# Patient Record
Sex: FEMALE | Race: WHITE | Employment: FULL TIME | URBAN - METROPOLITAN AREA
[De-identification: names, ages, dates, MRNs, and addresses within clinical notes are randomized per-mention and may not be internally consistent; named-entity substitution may affect disease eponyms.]

---

## 2018-05-21 ENCOUNTER — HOSPITAL ENCOUNTER (EMERGENCY)
Age: 53
Discharge: HOME | End: 2018-05-22
Payer: COMMERCIAL

## 2018-05-21 VITALS
OXYGEN SATURATION: 99 % | HEART RATE: 85 BPM | DIASTOLIC BLOOD PRESSURE: 90 MMHG | WEIGHT: 250 LBS | SYSTOLIC BLOOD PRESSURE: 174 MMHG | HEIGHT: 64 IN | RESPIRATION RATE: 14 BRPM | TEMPERATURE: 97.5 F | BODY MASS INDEX: 42.68 KG/M2

## 2018-05-21 DIAGNOSIS — R00.2 PALPITATIONS: Primary | ICD-10-CM

## 2018-05-21 LAB
EKG ATRIAL RATE: 97 BPM
EKG P AXIS: 34 DEGREES
EKG P-R INTERVAL: 166 MS
EKG Q-T INTERVAL: 390 MS
EKG QRS DURATION: 128 MS
EKG QTC CALCULATION (BAZETT): 495 MS
EKG R AXIS: -78 DEGREES
EKG T AXIS: 49 DEGREES
EKG VENTRICULAR RATE: 97 BPM

## 2018-05-21 PROCEDURE — 99285 EMERGENCY DEPT VISIT HI MDM: CPT

## 2018-05-21 RX ORDER — 0.9 % SODIUM CHLORIDE 0.9 %
1000 INTRAVENOUS SOLUTION INTRAVENOUS ONCE
Status: COMPLETED | OUTPATIENT
Start: 2018-05-21 | End: 2018-05-22

## 2018-05-21 RX ORDER — DIAZEPAM 10 MG/1
10 TABLET ORAL PRN
COMMUNITY
End: 2019-11-25 | Stop reason: SDUPTHER

## 2018-05-22 ENCOUNTER — APPOINTMENT (OUTPATIENT)
Dept: GENERAL RADIOLOGY | Age: 53
End: 2018-05-22
Payer: COMMERCIAL

## 2018-05-22 LAB
ANION GAP SERPL CALCULATED.3IONS-SCNC: 14 MMOL/L (ref 7–16)
BASOPHILS ABSOLUTE: 0.04 E9/L (ref 0–0.2)
BASOPHILS RELATIVE PERCENT: 0.5 % (ref 0–2)
BUN BLDV-MCNC: 10 MG/DL (ref 6–20)
CALCIUM SERPL-MCNC: 9.9 MG/DL (ref 8.6–10.2)
CHLORIDE BLD-SCNC: 100 MMOL/L (ref 98–107)
CO2: 25 MMOL/L (ref 22–29)
CREAT SERPL-MCNC: 0.8 MG/DL (ref 0.5–1)
D DIMER: <200 NG/ML DDU
EOSINOPHILS ABSOLUTE: 0.03 E9/L (ref 0.05–0.5)
EOSINOPHILS RELATIVE PERCENT: 0.4 % (ref 0–6)
GFR AFRICAN AMERICAN: >60
GFR NON-AFRICAN AMERICAN: >60 ML/MIN/1.73
GLUCOSE BLD-MCNC: 97 MG/DL (ref 74–109)
HCT VFR BLD CALC: 43.9 % (ref 34–48)
HEMOGLOBIN: 14.1 G/DL (ref 11.5–15.5)
IMMATURE GRANULOCYTES #: 0.03 E9/L
IMMATURE GRANULOCYTES %: 0.4 % (ref 0–5)
LYMPHOCYTES ABSOLUTE: 1.82 E9/L (ref 1.5–4)
LYMPHOCYTES RELATIVE PERCENT: 23.6 % (ref 20–42)
MAGNESIUM: 1.9 MG/DL (ref 1.6–2.6)
MCH RBC QN AUTO: 25.7 PG (ref 26–35)
MCHC RBC AUTO-ENTMCNC: 32.1 % (ref 32–34.5)
MCV RBC AUTO: 80.1 FL (ref 80–99.9)
MONOCYTES ABSOLUTE: 0.46 E9/L (ref 0.1–0.95)
MONOCYTES RELATIVE PERCENT: 6 % (ref 2–12)
NEUTROPHILS ABSOLUTE: 5.34 E9/L (ref 1.8–7.3)
NEUTROPHILS RELATIVE PERCENT: 69.1 % (ref 43–80)
PDW BLD-RTO: 13.5 FL (ref 11.5–15)
PLATELET # BLD: 215 E9/L (ref 130–450)
PMV BLD AUTO: 11.1 FL (ref 7–12)
POTASSIUM SERPL-SCNC: 3.5 MMOL/L (ref 3.5–5)
POTASSIUM SERPL-SCNC: 5.4 MMOL/L (ref 3.5–5)
PRO-BNP: 49 PG/ML (ref 0–125)
RBC # BLD: 5.48 E12/L (ref 3.5–5.5)
SODIUM BLD-SCNC: 139 MMOL/L (ref 132–146)
TROPONIN: <0.01 NG/ML (ref 0–0.03)
TSH SERPL DL<=0.05 MIU/L-ACNC: 3.33 UIU/ML (ref 0.27–4.2)
WBC # BLD: 7.7 E9/L (ref 4.5–11.5)

## 2018-05-22 PROCEDURE — 85025 COMPLETE CBC W/AUTO DIFF WBC: CPT

## 2018-05-22 PROCEDURE — 84132 ASSAY OF SERUM POTASSIUM: CPT

## 2018-05-22 PROCEDURE — 84443 ASSAY THYROID STIM HORMONE: CPT

## 2018-05-22 PROCEDURE — 36415 COLL VENOUS BLD VENIPUNCTURE: CPT

## 2018-05-22 PROCEDURE — 85378 FIBRIN DEGRADE SEMIQUANT: CPT

## 2018-05-22 PROCEDURE — 80048 BASIC METABOLIC PNL TOTAL CA: CPT

## 2018-05-22 PROCEDURE — 71045 X-RAY EXAM CHEST 1 VIEW: CPT

## 2018-05-22 PROCEDURE — 84484 ASSAY OF TROPONIN QUANT: CPT

## 2018-05-22 PROCEDURE — 83735 ASSAY OF MAGNESIUM: CPT

## 2018-05-22 PROCEDURE — 83880 ASSAY OF NATRIURETIC PEPTIDE: CPT

## 2018-05-22 PROCEDURE — 2580000003 HC RX 258: Performed by: NURSE PRACTITIONER

## 2018-05-22 RX ADMIN — SODIUM CHLORIDE 1000 ML: 9 INJECTION, SOLUTION INTRAVENOUS at 00:47

## 2018-05-25 RX ORDER — ATENOLOL 25 MG/1
TABLET ORAL
Qty: 270 TABLET | Refills: 3 | Status: SHIPPED | OUTPATIENT
Start: 2018-05-25 | End: 2019-05-13 | Stop reason: SDUPTHER

## 2018-05-29 ENCOUNTER — OFFICE VISIT (OUTPATIENT)
Dept: CARDIOLOGY CLINIC | Age: 53
End: 2018-05-29
Payer: COMMERCIAL

## 2018-05-29 VITALS
BODY MASS INDEX: 49.25 KG/M2 | SYSTOLIC BLOOD PRESSURE: 132 MMHG | HEIGHT: 64 IN | RESPIRATION RATE: 18 BRPM | DIASTOLIC BLOOD PRESSURE: 84 MMHG | WEIGHT: 288.5 LBS | HEART RATE: 70 BPM

## 2018-05-29 DIAGNOSIS — G47.33 OBSTRUCTIVE SLEEP APNEA SYNDROME: ICD-10-CM

## 2018-05-29 DIAGNOSIS — E78.2 MIXED HYPERLIPIDEMIA: ICD-10-CM

## 2018-05-29 DIAGNOSIS — R00.2 PALPITATIONS: Primary | ICD-10-CM

## 2018-05-29 DIAGNOSIS — R60.0 LEG EDEMA: ICD-10-CM

## 2018-05-29 DIAGNOSIS — I45.10 RBBB: ICD-10-CM

## 2018-05-29 PROCEDURE — 1036F TOBACCO NON-USER: CPT | Performed by: PHYSICIAN ASSISTANT

## 2018-05-29 PROCEDURE — 93000 ELECTROCARDIOGRAM COMPLETE: CPT | Performed by: INTERNAL MEDICINE

## 2018-05-29 PROCEDURE — G8417 CALC BMI ABV UP PARAM F/U: HCPCS | Performed by: PHYSICIAN ASSISTANT

## 2018-05-29 PROCEDURE — 3017F COLORECTAL CA SCREEN DOC REV: CPT | Performed by: PHYSICIAN ASSISTANT

## 2018-05-29 PROCEDURE — 99213 OFFICE O/P EST LOW 20 MIN: CPT | Performed by: PHYSICIAN ASSISTANT

## 2018-05-29 PROCEDURE — G8427 DOCREV CUR MEDS BY ELIG CLIN: HCPCS | Performed by: PHYSICIAN ASSISTANT

## 2018-05-29 RX ORDER — PROMETHAZINE HYDROCHLORIDE AND CODEINE PHOSPHATE 6.25; 1 MG/5ML; MG/5ML
SYRUP ORAL
Refills: 0 | COMMUNITY
Start: 2018-05-22 | End: 2019-11-25

## 2018-05-29 RX ORDER — CEFDINIR 300 MG/1
CAPSULE ORAL
Refills: 0 | COMMUNITY
Start: 2018-05-22 | End: 2019-11-25

## 2018-05-29 RX ORDER — PREDNISONE 10 MG/1
TABLET ORAL
Refills: 0 | COMMUNITY
Start: 2018-05-22 | End: 2019-11-25 | Stop reason: SDUPTHER

## 2018-06-29 ENCOUNTER — OFFICE VISIT (OUTPATIENT)
Dept: CARDIOLOGY CLINIC | Age: 53
End: 2018-06-29
Payer: COMMERCIAL

## 2018-06-29 VITALS
DIASTOLIC BLOOD PRESSURE: 82 MMHG | WEIGHT: 278.6 LBS | HEIGHT: 64 IN | HEART RATE: 62 BPM | RESPIRATION RATE: 16 BRPM | SYSTOLIC BLOOD PRESSURE: 118 MMHG | BODY MASS INDEX: 47.56 KG/M2

## 2018-06-29 DIAGNOSIS — I25.2 MI, OLD: Primary | ICD-10-CM

## 2018-06-29 DIAGNOSIS — R00.2 PALPITATIONS: Primary | ICD-10-CM

## 2018-06-29 DIAGNOSIS — E78.5 HYPERLIPIDEMIA, UNSPECIFIED HYPERLIPIDEMIA TYPE: ICD-10-CM

## 2018-06-29 PROCEDURE — G8427 DOCREV CUR MEDS BY ELIG CLIN: HCPCS | Performed by: INTERNAL MEDICINE

## 2018-06-29 PROCEDURE — 1036F TOBACCO NON-USER: CPT | Performed by: INTERNAL MEDICINE

## 2018-06-29 PROCEDURE — 99213 OFFICE O/P EST LOW 20 MIN: CPT | Performed by: INTERNAL MEDICINE

## 2018-06-29 PROCEDURE — 3017F COLORECTAL CA SCREEN DOC REV: CPT | Performed by: INTERNAL MEDICINE

## 2018-06-29 PROCEDURE — 93000 ELECTROCARDIOGRAM COMPLETE: CPT | Performed by: INTERNAL MEDICINE

## 2018-06-29 PROCEDURE — G8417 CALC BMI ABV UP PARAM F/U: HCPCS | Performed by: INTERNAL MEDICINE

## 2018-09-13 ENCOUNTER — TELEPHONE (OUTPATIENT)
Dept: CARDIOLOGY CLINIC | Age: 53
End: 2018-09-13

## 2019-05-13 RX ORDER — ATENOLOL 25 MG/1
TABLET ORAL
Qty: 270 TABLET | Refills: 3 | Status: SHIPPED | OUTPATIENT
Start: 2019-05-13 | End: 2019-08-10 | Stop reason: SDUPTHER

## 2019-05-14 VITALS
HEIGHT: 65 IN | TEMPERATURE: 98.6 F | BODY MASS INDEX: 41.6 KG/M2 | SYSTOLIC BLOOD PRESSURE: 128 MMHG | DIASTOLIC BLOOD PRESSURE: 78 MMHG | HEART RATE: 104 BPM

## 2019-05-14 RX ORDER — ALPRAZOLAM 0.5 MG/1
TABLET ORAL EVERY 4 HOURS PRN
COMMUNITY
Start: 2016-01-20 | End: 2019-11-25

## 2019-05-14 RX ORDER — POTASSIUM CHLORIDE 1500 MG/1
TABLET, FILM COATED, EXTENDED RELEASE ORAL DAILY
COMMUNITY
Start: 2018-12-04 | End: 2019-11-25

## 2019-08-10 RX ORDER — ATENOLOL 25 MG/1
25 TABLET ORAL 3 TIMES DAILY
Qty: 270 TABLET | Refills: 5 | Status: SHIPPED | OUTPATIENT
Start: 2019-08-10 | End: 2019-11-25 | Stop reason: SDUPTHER

## 2019-08-28 ENCOUNTER — TELEPHONE (OUTPATIENT)
Dept: ADMINISTRATIVE | Age: 54
End: 2019-08-28

## 2019-08-28 DIAGNOSIS — E03.9 ACQUIRED HYPOTHYROIDISM: ICD-10-CM

## 2019-08-28 DIAGNOSIS — Z00.01 ENCOUNTER FOR ANNUAL GENERAL MEDICAL EXAMINATION WITH ABNORMAL FINDINGS IN ADULT: Primary | ICD-10-CM

## 2019-08-28 DIAGNOSIS — I25.10 CORONARY ARTERY DISEASE INVOLVING NATIVE CORONARY ARTERY OF NATIVE HEART WITHOUT ANGINA PECTORIS: ICD-10-CM

## 2019-08-28 DIAGNOSIS — E11.9 DIET-CONTROLLED DIABETES MELLITUS (HCC): ICD-10-CM

## 2019-08-28 NOTE — TELEPHONE ENCOUNTER
Pt scheduled yearly for November and wants to be sure that a lab order is added to get labs prior.     thanks

## 2019-11-22 ENCOUNTER — HOSPITAL ENCOUNTER (OUTPATIENT)
Age: 54
Discharge: HOME | End: 2019-11-24
Payer: COMMERCIAL

## 2019-11-22 DIAGNOSIS — E11.9 DIET-CONTROLLED DIABETES MELLITUS (HCC): ICD-10-CM

## 2019-11-22 DIAGNOSIS — I25.10 CORONARY ARTERY DISEASE INVOLVING NATIVE CORONARY ARTERY OF NATIVE HEART WITHOUT ANGINA PECTORIS: ICD-10-CM

## 2019-11-22 DIAGNOSIS — Z00.01 ENCOUNTER FOR ANNUAL GENERAL MEDICAL EXAMINATION WITH ABNORMAL FINDINGS IN ADULT: ICD-10-CM

## 2019-11-22 DIAGNOSIS — E03.9 ACQUIRED HYPOTHYROIDISM: ICD-10-CM

## 2019-11-22 LAB
ALBUMIN SERPL-MCNC: 4.3 G/DL (ref 3.5–5.2)
ALP BLD-CCNC: 69 U/L (ref 35–104)
ALT SERPL-CCNC: 15 U/L (ref 0–32)
ANION GAP SERPL CALCULATED.3IONS-SCNC: 19 MMOL/L (ref 7–16)
AST SERPL-CCNC: 15 U/L (ref 0–31)
BASOPHILS ABSOLUTE: 0.04 E9/L (ref 0–0.2)
BASOPHILS RELATIVE PERCENT: 0.6 % (ref 0–2)
BILIRUB SERPL-MCNC: 0.6 MG/DL (ref 0–1.2)
BUN BLDV-MCNC: 9 MG/DL (ref 6–20)
CALCIUM SERPL-MCNC: 9.8 MG/DL (ref 8.6–10.2)
CHLORIDE BLD-SCNC: 100 MMOL/L (ref 98–107)
CHOLESTEROL, TOTAL: 166 MG/DL (ref 0–199)
CO2: 23 MMOL/L (ref 22–29)
CREAT SERPL-MCNC: 0.8 MG/DL (ref 0.5–1)
EOSINOPHILS ABSOLUTE: 0.04 E9/L (ref 0.05–0.5)
EOSINOPHILS RELATIVE PERCENT: 0.6 % (ref 0–6)
GFR AFRICAN AMERICAN: >60
GFR NON-AFRICAN AMERICAN: >60 ML/MIN/1.73
GLUCOSE BLD-MCNC: 84 MG/DL (ref 74–99)
HBA1C MFR BLD: 5.7 % (ref 4–5.6)
HCT VFR BLD CALC: 45.4 % (ref 34–48)
HDLC SERPL-MCNC: 49 MG/DL
HEMOGLOBIN: 13.4 G/DL (ref 11.5–15.5)
IMMATURE GRANULOCYTES #: 0.02 E9/L
IMMATURE GRANULOCYTES %: 0.3 % (ref 0–5)
LDL CHOLESTEROL CALCULATED: 60 MG/DL (ref 0–99)
LYMPHOCYTES ABSOLUTE: 1.34 E9/L (ref 1.5–4)
LYMPHOCYTES RELATIVE PERCENT: 19.2 % (ref 20–42)
MCH RBC QN AUTO: 24 PG (ref 26–35)
MCHC RBC AUTO-ENTMCNC: 29.5 % (ref 32–34.5)
MCV RBC AUTO: 81.4 FL (ref 80–99.9)
MONOCYTES ABSOLUTE: 0.43 E9/L (ref 0.1–0.95)
MONOCYTES RELATIVE PERCENT: 6.2 % (ref 2–12)
NEUTROPHILS ABSOLUTE: 5.11 E9/L (ref 1.8–7.3)
NEUTROPHILS RELATIVE PERCENT: 73.1 % (ref 43–80)
PDW BLD-RTO: 15 FL (ref 11.5–15)
PLATELET # BLD: 226 E9/L (ref 130–450)
PMV BLD AUTO: 10.4 FL (ref 7–12)
POTASSIUM SERPL-SCNC: 4.3 MMOL/L (ref 3.5–5)
RBC # BLD: 5.58 E12/L (ref 3.5–5.5)
SODIUM BLD-SCNC: 142 MMOL/L (ref 132–146)
T4 TOTAL: 8.4 MCG/DL (ref 4.5–11.7)
TOTAL PROTEIN: 7.2 G/DL (ref 6.4–8.3)
TRIGL SERPL-MCNC: 286 MG/DL (ref 0–149)
TSH SERPL DL<=0.05 MIU/L-ACNC: 4.77 UIU/ML (ref 0.27–4.2)
VLDLC SERPL CALC-MCNC: 57 MG/DL
WBC # BLD: 7 E9/L (ref 4.5–11.5)

## 2019-11-22 PROCEDURE — 85025 COMPLETE CBC W/AUTO DIFF WBC: CPT

## 2019-11-22 PROCEDURE — 36415 COLL VENOUS BLD VENIPUNCTURE: CPT

## 2019-11-22 PROCEDURE — 84443 ASSAY THYROID STIM HORMONE: CPT

## 2019-11-22 PROCEDURE — 84436 ASSAY OF TOTAL THYROXINE: CPT

## 2019-11-22 PROCEDURE — 80061 LIPID PANEL: CPT

## 2019-11-22 PROCEDURE — 83036 HEMOGLOBIN GLYCOSYLATED A1C: CPT

## 2019-11-22 PROCEDURE — 80053 COMPREHEN METABOLIC PANEL: CPT

## 2019-11-25 ENCOUNTER — OFFICE VISIT (OUTPATIENT)
Dept: PRIMARY CARE CLINIC | Age: 54
End: 2019-11-25
Payer: COMMERCIAL

## 2019-11-25 VITALS
BODY MASS INDEX: 50.02 KG/M2 | WEIGHT: 293 LBS | HEIGHT: 64 IN | SYSTOLIC BLOOD PRESSURE: 134 MMHG | DIASTOLIC BLOOD PRESSURE: 82 MMHG | TEMPERATURE: 98.7 F

## 2019-11-25 DIAGNOSIS — Z00.01 ENCOUNTER FOR ANNUAL GENERAL MEDICAL EXAMINATION WITH ABNORMAL FINDINGS IN ADULT: Primary | ICD-10-CM

## 2019-11-25 DIAGNOSIS — E03.9 ACQUIRED HYPOTHYROIDISM: ICD-10-CM

## 2019-11-25 DIAGNOSIS — I25.10 CORONARY ARTERY DISEASE INVOLVING NATIVE CORONARY ARTERY OF NATIVE HEART WITHOUT ANGINA PECTORIS: ICD-10-CM

## 2019-11-25 DIAGNOSIS — F41.9 ANXIETY: ICD-10-CM

## 2019-11-25 DIAGNOSIS — E78.2 MIXED HYPERLIPIDEMIA: ICD-10-CM

## 2019-11-25 DIAGNOSIS — Z12.39 SCREENING FOR MALIGNANT NEOPLASM OF BREAST: ICD-10-CM

## 2019-11-25 DIAGNOSIS — I10 ESSENTIAL HYPERTENSION: ICD-10-CM

## 2019-11-25 DIAGNOSIS — G47.33 SLEEP APNEA, OBSTRUCTIVE: ICD-10-CM

## 2019-11-25 DIAGNOSIS — Z12.11 SCREENING FOR MALIGNANT NEOPLASM OF COLON: ICD-10-CM

## 2019-11-25 DIAGNOSIS — K43.9 VENTRAL HERNIA WITHOUT OBSTRUCTION OR GANGRENE: ICD-10-CM

## 2019-11-25 DIAGNOSIS — Z23 NEED FOR INFLUENZA VACCINATION: ICD-10-CM

## 2019-11-25 DIAGNOSIS — M17.0 PRIMARY OSTEOARTHRITIS OF BOTH KNEES: ICD-10-CM

## 2019-11-25 PROCEDURE — G8484 FLU IMMUNIZE NO ADMIN: HCPCS | Performed by: FAMILY MEDICINE

## 2019-11-25 PROCEDURE — 99396 PREV VISIT EST AGE 40-64: CPT | Performed by: FAMILY MEDICINE

## 2019-11-25 RX ORDER — SIMVASTATIN 40 MG
40 TABLET ORAL NIGHTLY
Qty: 90 TABLET | Refills: 12 | Status: SHIPPED
Start: 2019-11-25 | End: 2020-12-23 | Stop reason: SDUPTHER

## 2019-11-25 RX ORDER — DIAZEPAM 10 MG/1
10 TABLET ORAL EVERY 12 HOURS PRN
Qty: 14 TABLET | Refills: 0 | Status: SHIPPED | OUTPATIENT
Start: 2019-11-25 | End: 2019-12-02

## 2019-11-25 RX ORDER — PREDNISONE 10 MG/1
10 TABLET ORAL 3 TIMES DAILY
Qty: 15 TABLET | Refills: 2 | Status: SHIPPED | OUTPATIENT
Start: 2019-11-25 | End: 2020-01-02 | Stop reason: CLARIF

## 2019-11-25 RX ORDER — TORSEMIDE 5 MG/1
5 TABLET ORAL DAILY
Qty: 90 TABLET | Refills: 5 | Status: SHIPPED
Start: 2019-11-25 | End: 2021-02-03 | Stop reason: SDUPTHER

## 2019-11-25 RX ORDER — POTASSIUM CHLORIDE 20 MEQ/1
20 TABLET, EXTENDED RELEASE ORAL DAILY
Qty: 90 TABLET | Refills: 12 | Status: SHIPPED
Start: 2019-11-25 | End: 2021-02-03 | Stop reason: SDUPTHER

## 2019-11-25 RX ORDER — LEVOTHYROXINE SODIUM 0.15 MG/1
150 TABLET ORAL DAILY
Qty: 90 TABLET | Refills: 5 | Status: SHIPPED
Start: 2019-11-25 | End: 2020-12-23 | Stop reason: SDUPTHER

## 2019-11-25 RX ORDER — ATENOLOL 25 MG/1
25 TABLET ORAL 3 TIMES DAILY
Qty: 270 TABLET | Refills: 5 | Status: SHIPPED
Start: 2019-11-25 | End: 2021-02-03 | Stop reason: SDUPTHER

## 2019-11-25 ASSESSMENT — PATIENT HEALTH QUESTIONNAIRE - PHQ9
1. LITTLE INTEREST OR PLEASURE IN DOING THINGS: 0
2. FEELING DOWN, DEPRESSED OR HOPELESS: 0
SUM OF ALL RESPONSES TO PHQ QUESTIONS 1-9: 0
SUM OF ALL RESPONSES TO PHQ QUESTIONS 1-9: 0
SUM OF ALL RESPONSES TO PHQ9 QUESTIONS 1 & 2: 0

## 2019-11-25 ASSESSMENT — ENCOUNTER SYMPTOMS
ALLERGIC/IMMUNOLOGIC NEGATIVE: 1
RESPIRATORY NEGATIVE: 1
GASTROINTESTINAL NEGATIVE: 1
EYES NEGATIVE: 1

## 2019-12-02 ENCOUNTER — APPOINTMENT (OUTPATIENT)
Dept: CT IMAGING | Age: 54
End: 2019-12-02
Payer: COMMERCIAL

## 2019-12-02 ENCOUNTER — HOSPITAL ENCOUNTER (EMERGENCY)
Age: 54
Discharge: HOME | End: 2019-12-02
Attending: EMERGENCY MEDICINE
Payer: COMMERCIAL

## 2019-12-02 ENCOUNTER — APPOINTMENT (OUTPATIENT)
Dept: GENERAL RADIOLOGY | Age: 54
End: 2019-12-02
Payer: COMMERCIAL

## 2019-12-02 ENCOUNTER — OFFICE VISIT (OUTPATIENT)
Dept: PRIMARY CARE CLINIC | Age: 54
End: 2019-12-02
Payer: COMMERCIAL

## 2019-12-02 VITALS
HEIGHT: 64 IN | BODY MASS INDEX: 50.02 KG/M2 | HEART RATE: 78 BPM | OXYGEN SATURATION: 96 % | TEMPERATURE: 98.4 F | DIASTOLIC BLOOD PRESSURE: 72 MMHG | RESPIRATION RATE: 17 BRPM | SYSTOLIC BLOOD PRESSURE: 128 MMHG | WEIGHT: 293 LBS

## 2019-12-02 VITALS
WEIGHT: 293 LBS | OXYGEN SATURATION: 98 % | BODY MASS INDEX: 55.61 KG/M2 | HEART RATE: 100 BPM | SYSTOLIC BLOOD PRESSURE: 134 MMHG | TEMPERATURE: 98 F | DIASTOLIC BLOOD PRESSURE: 82 MMHG

## 2019-12-02 DIAGNOSIS — K43.9 VENTRAL HERNIA WITHOUT OBSTRUCTION OR GANGRENE: ICD-10-CM

## 2019-12-02 DIAGNOSIS — R10.32 LEFT LOWER QUADRANT ABDOMINAL PAIN: Primary | ICD-10-CM

## 2019-12-02 DIAGNOSIS — R10.84 GENERALIZED ABDOMINAL PAIN: ICD-10-CM

## 2019-12-02 DIAGNOSIS — K65.4 MESENTERIC PANNICULITIS (HCC): Primary | ICD-10-CM

## 2019-12-02 LAB
ALBUMIN SERPL-MCNC: 4 G/DL (ref 3.5–5.2)
ALP BLD-CCNC: 67 U/L (ref 35–104)
ALT SERPL-CCNC: 15 U/L (ref 0–32)
ANION GAP SERPL CALCULATED.3IONS-SCNC: 12 MMOL/L (ref 7–16)
AST SERPL-CCNC: 16 U/L (ref 0–31)
BACTERIA: NORMAL /HPF
BASOPHILS ABSOLUTE: 0.02 E9/L (ref 0–0.2)
BASOPHILS RELATIVE PERCENT: 0.4 % (ref 0–2)
BILIRUB SERPL-MCNC: 0.5 MG/DL (ref 0–1.2)
BILIRUBIN URINE: NEGATIVE
BLOOD, URINE: NEGATIVE
BUN BLDV-MCNC: 11 MG/DL (ref 6–20)
CALCIUM SERPL-MCNC: 9.4 MG/DL (ref 8.6–10.2)
CHLORIDE BLD-SCNC: 98 MMOL/L (ref 98–107)
CLARITY: CLEAR
CO2: 29 MMOL/L (ref 22–29)
COLOR: YELLOW
CREAT SERPL-MCNC: 0.8 MG/DL (ref 0.5–1)
EKG ATRIAL RATE: 67 BPM
EKG P AXIS: 29 DEGREES
EKG P-R INTERVAL: 180 MS
EKG Q-T INTERVAL: 432 MS
EKG QRS DURATION: 126 MS
EKG QTC CALCULATION (BAZETT): 456 MS
EKG R AXIS: -81 DEGREES
EKG T AXIS: 3 DEGREES
EKG VENTRICULAR RATE: 67 BPM
EOSINOPHILS ABSOLUTE: 0.07 E9/L (ref 0.05–0.5)
EOSINOPHILS RELATIVE PERCENT: 1.4 % (ref 0–6)
GFR AFRICAN AMERICAN: >60
GFR NON-AFRICAN AMERICAN: >60 ML/MIN/1.73
GLUCOSE BLD-MCNC: 102 MG/DL (ref 74–99)
GLUCOSE URINE: NEGATIVE MG/DL
HCT VFR BLD CALC: 44.4 % (ref 34–48)
HEMOGLOBIN: 13.5 G/DL (ref 11.5–15.5)
IMMATURE GRANULOCYTES #: 0.03 E9/L
IMMATURE GRANULOCYTES %: 0.6 % (ref 0–5)
KETONES, URINE: NEGATIVE MG/DL
LACTIC ACID: 1.5 MMOL/L (ref 0.5–2.2)
LEUKOCYTE ESTERASE, URINE: NEGATIVE
LIPASE: 18 U/L (ref 13–60)
LYMPHOCYTES ABSOLUTE: 1.28 E9/L (ref 1.5–4)
LYMPHOCYTES RELATIVE PERCENT: 24.8 % (ref 20–42)
MCH RBC QN AUTO: 24 PG (ref 26–35)
MCHC RBC AUTO-ENTMCNC: 30.4 % (ref 32–34.5)
MCV RBC AUTO: 79 FL (ref 80–99.9)
MONOCYTES ABSOLUTE: 0.43 E9/L (ref 0.1–0.95)
MONOCYTES RELATIVE PERCENT: 8.3 % (ref 2–12)
NEUTROPHILS ABSOLUTE: 3.33 E9/L (ref 1.8–7.3)
NEUTROPHILS RELATIVE PERCENT: 64.5 % (ref 43–80)
NITRITE, URINE: NEGATIVE
PDW BLD-RTO: 15.1 FL (ref 11.5–15)
PH UA: 6 (ref 5–9)
PLATELET # BLD: 206 E9/L (ref 130–450)
PMV BLD AUTO: 9.5 FL (ref 7–12)
POTASSIUM REFLEX MAGNESIUM: 3.8 MMOL/L (ref 3.5–5)
PROTEIN UA: NEGATIVE MG/DL
RBC # BLD: 5.62 E12/L (ref 3.5–5.5)
RBC UA: NORMAL /HPF (ref 0–2)
SODIUM BLD-SCNC: 139 MMOL/L (ref 132–146)
SPECIFIC GRAVITY UA: <=1.005 (ref 1–1.03)
TOTAL PROTEIN: 7.2 G/DL (ref 6.4–8.3)
TROPONIN: <0.01 NG/ML (ref 0–0.03)
UROBILINOGEN, URINE: 0.2 E.U./DL
WBC # BLD: 5.2 E9/L (ref 4.5–11.5)
WBC UA: NORMAL /HPF (ref 0–5)

## 2019-12-02 PROCEDURE — 71046 X-RAY EXAM CHEST 2 VIEWS: CPT

## 2019-12-02 PROCEDURE — 80053 COMPREHEN METABOLIC PANEL: CPT

## 2019-12-02 PROCEDURE — 99213 OFFICE O/P EST LOW 20 MIN: CPT | Performed by: FAMILY MEDICINE

## 2019-12-02 PROCEDURE — 6360000002 HC RX W HCPCS: Performed by: STUDENT IN AN ORGANIZED HEALTH CARE EDUCATION/TRAINING PROGRAM

## 2019-12-02 PROCEDURE — 84484 ASSAY OF TROPONIN QUANT: CPT

## 2019-12-02 PROCEDURE — 1036F TOBACCO NON-USER: CPT | Performed by: FAMILY MEDICINE

## 2019-12-02 PROCEDURE — 96375 TX/PRO/DX INJ NEW DRUG ADDON: CPT

## 2019-12-02 PROCEDURE — 99284 EMERGENCY DEPT VISIT MOD MDM: CPT

## 2019-12-02 PROCEDURE — G8417 CALC BMI ABV UP PARAM F/U: HCPCS | Performed by: FAMILY MEDICINE

## 2019-12-02 PROCEDURE — 2580000003 HC RX 258: Performed by: STUDENT IN AN ORGANIZED HEALTH CARE EDUCATION/TRAINING PROGRAM

## 2019-12-02 PROCEDURE — 96374 THER/PROPH/DIAG INJ IV PUSH: CPT

## 2019-12-02 PROCEDURE — G8598 ASA/ANTIPLAT THER USED: HCPCS | Performed by: FAMILY MEDICINE

## 2019-12-02 PROCEDURE — 83690 ASSAY OF LIPASE: CPT

## 2019-12-02 PROCEDURE — 3017F COLORECTAL CA SCREEN DOC REV: CPT | Performed by: FAMILY MEDICINE

## 2019-12-02 PROCEDURE — 83605 ASSAY OF LACTIC ACID: CPT

## 2019-12-02 PROCEDURE — 2580000003 HC RX 258: Performed by: RADIOLOGY

## 2019-12-02 PROCEDURE — 6360000004 HC RX CONTRAST MEDICATION: Performed by: RADIOLOGY

## 2019-12-02 PROCEDURE — 81001 URINALYSIS AUTO W/SCOPE: CPT

## 2019-12-02 PROCEDURE — 93005 ELECTROCARDIOGRAM TRACING: CPT | Performed by: STUDENT IN AN ORGANIZED HEALTH CARE EDUCATION/TRAINING PROGRAM

## 2019-12-02 PROCEDURE — 85025 COMPLETE CBC W/AUTO DIFF WBC: CPT

## 2019-12-02 PROCEDURE — 93010 ELECTROCARDIOGRAM REPORT: CPT | Performed by: INTERNAL MEDICINE

## 2019-12-02 PROCEDURE — G8484 FLU IMMUNIZE NO ADMIN: HCPCS | Performed by: FAMILY MEDICINE

## 2019-12-02 PROCEDURE — G8428 CUR MEDS NOT DOCUMENT: HCPCS | Performed by: FAMILY MEDICINE

## 2019-12-02 PROCEDURE — 74177 CT ABD & PELVIS W/CONTRAST: CPT

## 2019-12-02 RX ORDER — DIPHENHYDRAMINE HYDROCHLORIDE 50 MG/ML
50 INJECTION INTRAMUSCULAR; INTRAVENOUS ONCE
Status: COMPLETED | OUTPATIENT
Start: 2019-12-02 | End: 2019-12-02

## 2019-12-02 RX ORDER — METRONIDAZOLE 500 MG/1
500 TABLET ORAL 2 TIMES DAILY
Qty: 14 TABLET | Refills: 0 | Status: SHIPPED | OUTPATIENT
Start: 2019-12-02 | End: 2019-12-09

## 2019-12-02 RX ORDER — 0.9 % SODIUM CHLORIDE 0.9 %
1000 INTRAVENOUS SOLUTION INTRAVENOUS ONCE
Status: COMPLETED | OUTPATIENT
Start: 2019-12-02 | End: 2019-12-02

## 2019-12-02 RX ORDER — ONDANSETRON 2 MG/ML
4 INJECTION INTRAMUSCULAR; INTRAVENOUS ONCE
Status: COMPLETED | OUTPATIENT
Start: 2019-12-02 | End: 2019-12-02

## 2019-12-02 RX ORDER — SODIUM CHLORIDE 0.9 % (FLUSH) 0.9 %
10 SYRINGE (ML) INJECTION PRN
Status: COMPLETED | OUTPATIENT
Start: 2019-12-02 | End: 2019-12-02

## 2019-12-02 RX ORDER — MORPHINE SULFATE 2 MG/ML
2 INJECTION, SOLUTION INTRAMUSCULAR; INTRAVENOUS ONCE
Status: COMPLETED | OUTPATIENT
Start: 2019-12-02 | End: 2019-12-02

## 2019-12-02 RX ORDER — CEFDINIR 300 MG/1
300 CAPSULE ORAL 2 TIMES DAILY
Qty: 14 CAPSULE | Refills: 0 | Status: SHIPPED | OUTPATIENT
Start: 2019-12-02 | End: 2019-12-09

## 2019-12-02 RX ORDER — ONDANSETRON 4 MG/1
4 TABLET, ORALLY DISINTEGRATING ORAL EVERY 8 HOURS PRN
Qty: 10 TABLET | Refills: 0 | Status: SHIPPED | OUTPATIENT
Start: 2019-12-02 | End: 2020-12-23

## 2019-12-02 RX ORDER — METHYLPREDNISOLONE SODIUM SUCCINATE 125 MG/2ML
125 INJECTION, POWDER, LYOPHILIZED, FOR SOLUTION INTRAMUSCULAR; INTRAVENOUS ONCE
Status: COMPLETED | OUTPATIENT
Start: 2019-12-02 | End: 2019-12-02

## 2019-12-02 RX ORDER — NAPROXEN 250 MG/1
250 TABLET ORAL 2 TIMES DAILY WITH MEALS
Qty: 60 TABLET | Refills: 3 | Status: SHIPPED | OUTPATIENT
Start: 2019-12-02 | End: 2020-10-26

## 2019-12-02 RX ADMIN — SODIUM CHLORIDE 1000 ML: 9 INJECTION, SOLUTION INTRAVENOUS at 10:34

## 2019-12-02 RX ADMIN — MORPHINE SULFATE 2 MG: 2 INJECTION, SOLUTION INTRAMUSCULAR; INTRAVENOUS at 10:35

## 2019-12-02 RX ADMIN — DIPHENHYDRAMINE HYDROCHLORIDE 50 MG: 50 INJECTION, SOLUTION INTRAMUSCULAR; INTRAVENOUS at 10:34

## 2019-12-02 RX ADMIN — ONDANSETRON 4 MG: 2 INJECTION INTRAMUSCULAR; INTRAVENOUS at 10:35

## 2019-12-02 RX ADMIN — IOPAMIDOL 110 ML: 755 INJECTION, SOLUTION INTRAVENOUS at 12:19

## 2019-12-02 RX ADMIN — Medication 10 ML: at 12:17

## 2019-12-02 RX ADMIN — METHYLPREDNISOLONE SODIUM SUCCINATE 125 MG: 125 INJECTION, POWDER, FOR SOLUTION INTRAMUSCULAR; INTRAVENOUS at 10:34

## 2019-12-02 ASSESSMENT — ENCOUNTER SYMPTOMS
TROUBLE SWALLOWING: 0
SHORTNESS OF BREATH: 0
ABDOMINAL PAIN: 1
PHOTOPHOBIA: 0
VOMITING: 0
DIARRHEA: 1
HEMATEMESIS: 0
CONSTIPATION: 0
COUGH: 0
RESPIRATORY NEGATIVE: 1
ALLERGIC/IMMUNOLOGIC NEGATIVE: 1
BACK PAIN: 1
DIARRHEA: 1
EYES NEGATIVE: 1
NAUSEA: 1
HEMATOCHEZIA: 0

## 2019-12-02 ASSESSMENT — PAIN SCALES - GENERAL
PAINLEVEL_OUTOF10: 7
PAINLEVEL_OUTOF10: 2
PAINLEVEL_OUTOF10: 7

## 2019-12-10 ENCOUNTER — OFFICE VISIT (OUTPATIENT)
Dept: PRIMARY CARE CLINIC | Age: 54
End: 2019-12-10
Payer: COMMERCIAL

## 2019-12-10 VITALS — SYSTOLIC BLOOD PRESSURE: 134 MMHG | DIASTOLIC BLOOD PRESSURE: 82 MMHG | TEMPERATURE: 98.5 F

## 2019-12-10 DIAGNOSIS — K29.50 OTHER CHRONIC GASTRITIS WITHOUT HEMORRHAGE: ICD-10-CM

## 2019-12-10 DIAGNOSIS — E03.9 ACQUIRED HYPOTHYROIDISM: ICD-10-CM

## 2019-12-10 DIAGNOSIS — I10 ESSENTIAL HYPERTENSION: Primary | Chronic | ICD-10-CM

## 2019-12-10 DIAGNOSIS — E78.2 MIXED HYPERLIPIDEMIA: ICD-10-CM

## 2019-12-10 DIAGNOSIS — Z12.11 SCREEN FOR COLON CANCER: ICD-10-CM

## 2019-12-10 DIAGNOSIS — K43.9 VENTRAL HERNIA WITHOUT OBSTRUCTION OR GANGRENE: ICD-10-CM

## 2019-12-10 DIAGNOSIS — R73.03 PRE-DIABETES: ICD-10-CM

## 2019-12-10 PROCEDURE — 99213 OFFICE O/P EST LOW 20 MIN: CPT | Performed by: FAMILY MEDICINE

## 2019-12-10 PROCEDURE — G8598 ASA/ANTIPLAT THER USED: HCPCS | Performed by: FAMILY MEDICINE

## 2019-12-10 PROCEDURE — 3017F COLORECTAL CA SCREEN DOC REV: CPT | Performed by: FAMILY MEDICINE

## 2019-12-10 PROCEDURE — G8484 FLU IMMUNIZE NO ADMIN: HCPCS | Performed by: FAMILY MEDICINE

## 2019-12-10 PROCEDURE — G8428 CUR MEDS NOT DOCUMENT: HCPCS | Performed by: FAMILY MEDICINE

## 2019-12-10 PROCEDURE — 1036F TOBACCO NON-USER: CPT | Performed by: FAMILY MEDICINE

## 2019-12-10 PROCEDURE — G8417 CALC BMI ABV UP PARAM F/U: HCPCS | Performed by: FAMILY MEDICINE

## 2019-12-10 RX ORDER — PANTOPRAZOLE SODIUM 40 MG/1
40 TABLET, DELAYED RELEASE ORAL DAILY
Qty: 90 TABLET | Refills: 3 | Status: SHIPPED
Start: 2019-12-10 | End: 2020-12-23 | Stop reason: SDUPTHER

## 2019-12-10 RX ORDER — ONDANSETRON 4 MG/1
4 TABLET, FILM COATED ORAL 3 TIMES DAILY PRN
Qty: 100 TABLET | Refills: 5 | Status: SHIPPED | OUTPATIENT
Start: 2019-12-10 | End: 2020-12-23

## 2019-12-10 RX ORDER — PANTOPRAZOLE SODIUM 40 MG/1
40 TABLET, DELAYED RELEASE ORAL DAILY
COMMUNITY
Start: 2019-12-10 | End: 2019-12-10 | Stop reason: SDUPTHER

## 2019-12-10 ASSESSMENT — ENCOUNTER SYMPTOMS
GASTROINTESTINAL NEGATIVE: 1
RESPIRATORY NEGATIVE: 1
ALLERGIC/IMMUNOLOGIC NEGATIVE: 1
EYES NEGATIVE: 1

## 2019-12-30 ENCOUNTER — OFFICE VISIT (OUTPATIENT)
Dept: PRIMARY CARE CLINIC | Age: 54
End: 2019-12-30
Payer: COMMERCIAL

## 2019-12-30 ENCOUNTER — TELEPHONE (OUTPATIENT)
Dept: SURGERY | Age: 54
End: 2019-12-30

## 2019-12-30 VITALS
HEIGHT: 64 IN | RESPIRATION RATE: 16 BRPM | SYSTOLIC BLOOD PRESSURE: 134 MMHG | HEART RATE: 75 BPM | OXYGEN SATURATION: 96 % | BODY MASS INDEX: 51.84 KG/M2 | DIASTOLIC BLOOD PRESSURE: 82 MMHG

## 2019-12-30 DIAGNOSIS — R10.11 RIGHT UPPER QUADRANT ABDOMINAL PAIN: Primary | ICD-10-CM

## 2019-12-30 DIAGNOSIS — E66.01 MORBID (SEVERE) OBESITY DUE TO EXCESS CALORIES (HCC): ICD-10-CM

## 2019-12-30 DIAGNOSIS — K43.9 VENTRAL HERNIA WITHOUT OBSTRUCTION OR GANGRENE: Chronic | ICD-10-CM

## 2019-12-30 DIAGNOSIS — R10.11 RUQ PAIN: Primary | ICD-10-CM

## 2019-12-30 PROBLEM — F41.9 ANXIETY: Chronic | Status: ACTIVE | Noted: 2019-11-25

## 2019-12-30 PROBLEM — M17.0 PRIMARY OSTEOARTHRITIS OF BOTH KNEES: Chronic | Status: ACTIVE | Noted: 2019-11-25

## 2019-12-30 PROCEDURE — G8484 FLU IMMUNIZE NO ADMIN: HCPCS | Performed by: FAMILY MEDICINE

## 2019-12-30 PROCEDURE — 99213 OFFICE O/P EST LOW 20 MIN: CPT | Performed by: FAMILY MEDICINE

## 2019-12-30 PROCEDURE — G8428 CUR MEDS NOT DOCUMENT: HCPCS | Performed by: FAMILY MEDICINE

## 2019-12-30 PROCEDURE — 3017F COLORECTAL CA SCREEN DOC REV: CPT | Performed by: FAMILY MEDICINE

## 2019-12-30 PROCEDURE — G8598 ASA/ANTIPLAT THER USED: HCPCS | Performed by: FAMILY MEDICINE

## 2019-12-30 PROCEDURE — 1036F TOBACCO NON-USER: CPT | Performed by: FAMILY MEDICINE

## 2019-12-30 PROCEDURE — G8417 CALC BMI ABV UP PARAM F/U: HCPCS | Performed by: FAMILY MEDICINE

## 2019-12-30 ASSESSMENT — ENCOUNTER SYMPTOMS
ABDOMINAL PAIN: 1
EYES NEGATIVE: 1
ALLERGIC/IMMUNOLOGIC NEGATIVE: 1
RESPIRATORY NEGATIVE: 1

## 2019-12-30 ASSESSMENT — PATIENT HEALTH QUESTIONNAIRE - PHQ9
SUM OF ALL RESPONSES TO PHQ9 QUESTIONS 1 & 2: 0
2. FEELING DOWN, DEPRESSED OR HOPELESS: 0
SUM OF ALL RESPONSES TO PHQ QUESTIONS 1-9: 0
1. LITTLE INTEREST OR PLEASURE IN DOING THINGS: 0
SUM OF ALL RESPONSES TO PHQ QUESTIONS 1-9: 0

## 2019-12-31 ENCOUNTER — HOSPITAL ENCOUNTER (OUTPATIENT)
Dept: ULTRASOUND IMAGING | Age: 54
Discharge: HOME | End: 2020-01-02
Payer: COMMERCIAL

## 2019-12-31 PROCEDURE — 76705 ECHO EXAM OF ABDOMEN: CPT

## 2020-01-02 ENCOUNTER — OFFICE VISIT (OUTPATIENT)
Dept: SURGERY | Age: 55
End: 2020-01-02
Payer: COMMERCIAL

## 2020-01-02 VITALS
BODY MASS INDEX: 50.02 KG/M2 | DIASTOLIC BLOOD PRESSURE: 82 MMHG | HEIGHT: 64 IN | SYSTOLIC BLOOD PRESSURE: 120 MMHG | RESPIRATION RATE: 16 BRPM | OXYGEN SATURATION: 98 % | HEART RATE: 76 BPM | WEIGHT: 293 LBS

## 2020-01-02 PROCEDURE — 3017F COLORECTAL CA SCREEN DOC REV: CPT | Performed by: SURGERY

## 2020-01-02 PROCEDURE — 99203 OFFICE O/P NEW LOW 30 MIN: CPT | Performed by: SURGERY

## 2020-01-02 PROCEDURE — G8484 FLU IMMUNIZE NO ADMIN: HCPCS | Performed by: SURGERY

## 2020-01-02 PROCEDURE — G8417 CALC BMI ABV UP PARAM F/U: HCPCS | Performed by: SURGERY

## 2020-01-02 PROCEDURE — G8427 DOCREV CUR MEDS BY ELIG CLIN: HCPCS | Performed by: SURGERY

## 2020-01-02 PROCEDURE — 1036F TOBACCO NON-USER: CPT | Performed by: SURGERY

## 2020-01-02 RX ORDER — DIAZEPAM 10 MG/1
TABLET ORAL
Refills: 0 | COMMUNITY
Start: 2019-12-04 | End: 2020-05-13 | Stop reason: SDUPTHER
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
## 2020-01-14 ENCOUNTER — HOSPITAL ENCOUNTER (OUTPATIENT)
Dept: MAMMOGRAPHY | Age: 55
Discharge: HOME | End: 2020-01-16
Payer: COMMERCIAL

## 2020-01-14 PROCEDURE — 77063 BREAST TOMOSYNTHESIS BI: CPT

## 2020-01-17 ENCOUNTER — TELEPHONE (OUTPATIENT)
Dept: ONCOLOGY | Age: 55
End: 2020-01-17

## 2020-01-17 NOTE — TELEPHONE ENCOUNTER
Call to patient in reference to her mammogram performed on the [facility] on January 14, 2020. Instructed patient that the radiologist has recommended some additional breast imaging, in order to make a final determination/result. Informed her that a request for Rx has been faxed to the ordering physician. Once we receive the script a  from [facility] will contact her to schedule the additional imaging study/studies. Verbalizes understanding and is agreeable to proceed.        Ata Mak, RN, BSN, [place] [address] [place] [address]

## 2020-01-20 ENCOUNTER — TELEPHONE (OUTPATIENT)
Dept: PRIMARY CARE CLINIC | Age: 55
End: 2020-01-20

## 2020-01-20 NOTE — TELEPHONE ENCOUNTER
Please put an order for spot compression view and ultrasound if needed and notify patient that it does not look too suspicious but we do need to do some extra studies

## 2020-01-24 ENCOUNTER — HOSPITAL ENCOUNTER (OUTPATIENT)
Dept: GENERAL RADIOLOGY | Age: 55
Discharge: HOME | End: 2020-01-26
Payer: COMMERCIAL

## 2020-01-24 ENCOUNTER — TELEPHONE (OUTPATIENT)
Dept: PRIMARY CARE CLINIC | Age: 55
End: 2020-01-24

## 2020-01-24 PROCEDURE — G0279 TOMOSYNTHESIS, MAMMO: HCPCS

## 2020-01-24 PROCEDURE — 76642 ULTRASOUND BREAST LIMITED: CPT

## 2020-01-27 ENCOUNTER — TELEPHONE (OUTPATIENT)
Dept: PRIMARY CARE CLINIC | Age: 55
End: 2020-01-27

## 2020-01-27 NOTE — TELEPHONE ENCOUNTER
Notify that the ultrasound was normal but they are recommending mammogram only on that breast in 6 months.   Have her let us know in 6 months is up and will put the order in

## 2020-05-05 ENCOUNTER — TELEPHONE (OUTPATIENT)
Dept: PRIMARY CARE CLINIC | Age: 55
End: 2020-05-05

## 2020-05-13 ENCOUNTER — VIRTUAL VISIT (OUTPATIENT)
Dept: PRIMARY CARE CLINIC | Age: 55
End: 2020-05-13
Payer: COMMERCIAL

## 2020-05-13 VITALS — WEIGHT: 293 LBS | BODY MASS INDEX: 54.41 KG/M2

## 2020-05-13 PROCEDURE — 99396 PREV VISIT EST AGE 40-64: CPT | Performed by: FAMILY MEDICINE

## 2020-05-13 RX ORDER — ONDANSETRON 4 MG/1
4 TABLET, ORALLY DISINTEGRATING ORAL 3 TIMES DAILY PRN
Qty: 100 TABLET | Refills: 12 | Status: SHIPPED | OUTPATIENT
Start: 2020-05-13 | End: 2020-12-23

## 2020-05-13 RX ORDER — BUPROPION HYDROCHLORIDE 150 MG/1
150 TABLET ORAL EVERY MORNING
Qty: 90 TABLET | Refills: 1 | Status: SHIPPED
Start: 2020-05-13 | End: 2020-12-23 | Stop reason: SDUPTHER

## 2020-05-13 RX ORDER — DIAZEPAM 10 MG/1
10 TABLET ORAL NIGHTLY PRN
Qty: 14 TABLET | Refills: 0 | Status: SHIPPED | OUTPATIENT
Start: 2020-05-13 | End: 2020-05-27

## 2020-05-13 ASSESSMENT — PATIENT HEALTH QUESTIONNAIRE - PHQ9
SUM OF ALL RESPONSES TO PHQ QUESTIONS 1-9: 0
SUM OF ALL RESPONSES TO PHQ9 QUESTIONS 1 & 2: 0
2. FEELING DOWN, DEPRESSED OR HOPELESS: 0
SUM OF ALL RESPONSES TO PHQ QUESTIONS 1-9: 0
1. LITTLE INTEREST OR PLEASURE IN DOING THINGS: 0

## 2020-05-13 NOTE — PROGRESS NOTES
TeleMedicine Patient Consent    This visit was performed as a virtual video visit using a synchronous, two-way, audio-video telehealth technology platform. Patient identification was verified at the start of the visit, including the patient's telephone number and physical location. I discussed with the patient the nature of our telehealth visits, that:     1. Due to the nature of an audio- video modality, the only components of a physical exam that could be done are the elements supported by direct observation. 2. I would evaluate the patient and recommend diagnostics and treatments based on my assessment. 3. If it was felt that the patient should be evaluated in clinic or an emergency room setting, then they would be directed there. 4. Our sessions are not being recorded and that personal health information is protected. 5. Our team would provide follow up care in person if/when the patient needs it. Patient does agree to proceed with telemedicine consultation. Patient's location: home address in [facility]  Physician  location home address in [address] [place] [address] other people involved in call my  Am  Mk        Time spent: Greater than Not billed by time    This visit was completed virtually using Doxy. me               2020    TELEHEALTH EVALUATION -- Audio/Visual (During KQGGH-27 public health emergency)    HPI:    Jose Ravi (:  1965) has requested an audio/video evaluation for the following concern(s):    Checkup regarding coronary artery disease anxiety sleep apnea and weight. She is very upset about her weight. She requested a virtual visit today. She failed to get her blood work done. She said she found that she had a make appointment and did not bother doing so. She takes a very rare Valium she used 10 of those since her last prescription asking for refill. Patient now willing to go on anxiety medication.   She saw a psychologist in the past she did take Celexa but it did not work very well DO   torsemide (DEMADEX) 5 MG tablet Take 1 tablet by mouth daily  Will Yates, DO   potassium chloride (KLOR-CON M20) 20 MEQ extended release tablet Take 1 tablet by mouth daily  Will Yates, DO   simvastatin (ZOCOR) 40 MG tablet Take 1 tablet by mouth nightly  Will Yates, DO   levothyroxine (SYNTHROID) 150 MCG tablet Take 1 tablet by mouth daily  Will Yates, DO   aspirin 81 MG chewable tablet Take 81 mg by mouth 2 times daily   Historical Provider, MD       Social History     Tobacco Use    Smoking status: Never Smoker    Smokeless tobacco: Never Used   Substance Use Topics    Alcohol use: Yes     Comment: socially    Drug use: No        Allergies   Allergen Reactions    Iv Dye [Iodides] Rash    Pcn [Penicillins] Rash    Shellfish Allergy Itching   ,   Past Medical History:   Diagnosis Date    Chest pain     Coronary arteriosclerosis     Degenerative joint disease     involving multiple joints    Dysthymia     Hyperlipidemia     Hypothyroidism     MI, old 0    Obesity     Palpitations     Peptic reflux disease     Sleep apnea     Thyroid disease    ,   Past Surgical History:   Procedure Laterality Date     SECTION  1994     SECTION  1996    DIAGNOSTIC CARDIAC CATH LAB PROCEDURE      TONSILLECTOMY     ,   Social History     Tobacco Use    Smoking status: Never Smoker    Smokeless tobacco: Never Used   Substance Use Topics    Alcohol use: Yes     Comment: socially    Drug use: No   ,   Family History   Problem Relation Age of Onset    Cancer Mother     Heart Attack Father     Cancer Maternal Grandmother     Heart Attack Paternal Grandmother    ,   There is no immunization history on file for this patient.,   Health Maintenance   Topic Date Due    Hepatitis C screen  1965    Pneumococcal 0-64 years Vaccine (1 of 1 - PPSV23) 1970    HIV screen  1979    Hepatitis B vaccine (1 of 3 - Risk 3-dose series) 1983 caregiver was present when appropriate. Due to this being a TeleHealth encounter (During RKONS-05 public health emergency), evaluation of the following organ systems was limited: Vitals/Constitutional/EENT/Resp/CV/GI//MS/Neuro/Skin/Heme-Lymph-Imm. Pursuant to the emergency declaration under the [address] [place] [address], [address] [place] [address] and the [employer] Act, this Virtual Visit was conducted with patient's (and/or legal guardian's) consent, to reduce the patient's risk of exposure to COVID-19 and provide necessary medical care. The patient (and/or legal guardian) has also been advised to contact this office for worsening conditions or problems, and seek emergency medical treatment and/or call 911 if deemed necessary. Patient identification was verified at the start of the visit: Yes    Total time spent on this encounter: Not billed by time    Services were provided through a video synchronous discussion virtually to substitute for in-person clinic visit. Patient and provider were located at their individual homes. --Sotero Yates DO on 5/13/2020 at 8:25 AM    An electronic signature was used to authenticate this note.

## 2020-05-20 ENCOUNTER — VIRTUAL VISIT (OUTPATIENT)
Dept: PRIMARY CARE CLINIC | Age: 55
End: 2020-05-20
Payer: COMMERCIAL

## 2020-05-20 ENCOUNTER — TELEPHONE (OUTPATIENT)
Dept: ADMINISTRATIVE | Age: 55
End: 2020-05-20

## 2020-05-20 VITALS — BODY MASS INDEX: 54.41 KG/M2 | WEIGHT: 293 LBS

## 2020-05-20 PROCEDURE — 99213 OFFICE O/P EST LOW 20 MIN: CPT | Performed by: FAMILY MEDICINE

## 2020-05-20 PROCEDURE — 3017F COLORECTAL CA SCREEN DOC REV: CPT | Performed by: FAMILY MEDICINE

## 2020-05-20 PROCEDURE — G8428 CUR MEDS NOT DOCUMENT: HCPCS | Performed by: FAMILY MEDICINE

## 2020-05-20 PROCEDURE — 1036F TOBACCO NON-USER: CPT | Performed by: FAMILY MEDICINE

## 2020-05-20 PROCEDURE — G8417 CALC BMI ABV UP PARAM F/U: HCPCS | Performed by: FAMILY MEDICINE

## 2020-05-20 RX ORDER — PROMETHAZINE HYDROCHLORIDE AND CODEINE PHOSPHATE 6.25; 1 MG/5ML; MG/5ML
5 SYRUP ORAL EVERY 4 HOURS PRN
Qty: 120 ML | Refills: 0 | Status: SHIPPED | OUTPATIENT
Start: 2020-05-20 | End: 2020-05-27

## 2020-05-20 RX ORDER — CEFDINIR 300 MG/1
300 CAPSULE ORAL 2 TIMES DAILY
Qty: 20 CAPSULE | Refills: 0 | Status: SHIPPED | OUTPATIENT
Start: 2020-05-20 | End: 2020-05-30

## 2020-05-20 NOTE — PROGRESS NOTES
TeleMedicine Video Visit    This visit was performed as a virtual video visit using a synchronous, two-way, audio-video telehealth technology platform. Patient identification was verified at the start of the visit, including the patient's telephone number and physical location. I discussed with the patient the nature of our telehealth visits, that:     1. Due to the nature of an audio- video modality, the only components of a physical exam that could be done are the elements supported by direct observation. 2. I would evaluate the patient and recommend diagnostics and treatments based on my assessment. 3. If it was felt that the patient should be evaluated in clinic or an emergency room setting, then they would be directed there. 4. Our sessions are not being recorded and that personal health information is protected. 5. Our team would provide follow up care in person if/when the patient needs it. Patient does agree to proceed with telemedicine consultation. Patient's location: home address in [facility]  Physician  location home address in [facility] other people involved in call my yandel clinton      Time spent: Greater than Not billed by time    This visit was completed virtually using Doxy. me          2020    TELEHEALTH EVALUATION -- Audio/Visual (During APUKB-48 public health emergency)    HPI:    Alla Stearns (:  1965) has requested an audio/video evaluation for the following concern(s):    Requested a virtual visit because of sinus and cough. Onset 3 days ago no temperature sweats chills chest pain nausea vomiting. Dry cough no mucus production. No achiness. Review of Systems no chest pain shortness of breath see HPI    Prior to Visit Medications    Medication Sig Taking?  Authorizing Provider   cefdinir (OMNICEF) 300 MG capsule Take 1 capsule by mouth 2 times daily for 10 days Ok with pcn allergy Yes Will Yates, DO   promethazine-codeine (PHENERGAN WITH CODEINE) 6.25-10 MG/5ML syrup Take 5 mLs by mouth every 4 hours as needed for Cough for up to 7 days. Yes Will Yates DO   diazePAM (VALIUM) 10 MG tablet Take 1 tablet by mouth nightly as needed for Anxiety for up to 14 days.   Will Yates DO   ondansetron (ZOFRAN-ODT) 4 MG disintegrating tablet Take 1 tablet by mouth 3 times daily as needed for Nausea or Vomiting  Will Yates DO   buPROPion (WELLBUTRIN XL) 150 MG extended release tablet Take 1 tablet by mouth every morning  Will Yates DO   pantoprazole (PROTONIX) 40 MG tablet Take 1 tablet by mouth daily  Will Yates DO   ondansetron (ZOFRAN) 4 MG tablet Take 1 tablet by mouth 3 times daily as needed for Nausea or Vomiting  Will Yates DO   ondansetron (ZOFRAN ODT) 4 MG disintegrating tablet Take 1 tablet by mouth every 8 hours as needed for Nausea or Vomiting  Lubna Ohms, DO   naproxen (NAPROSYN) 250 MG tablet Take 1 tablet by mouth 2 times daily (with meals)  Lubna Ohms, DO   atenolol (TENORMIN) 25 MG tablet Take 1 tablet by mouth 3 times daily  Will Yates DO   torsemide (DEMADEX) 5 MG tablet Take 1 tablet by mouth daily  Will Yates DO   potassium chloride (KLOR-CON M20) 20 MEQ extended release tablet Take 1 tablet by mouth daily  Will Yates DO   simvastatin (ZOCOR) 40 MG tablet Take 1 tablet by mouth nightly  Will Yates DO   levothyroxine (SYNTHROID) 150 MCG tablet Take 1 tablet by mouth daily  Will Yates DO   aspirin 81 MG chewable tablet Take 81 mg by mouth 2 times daily   Historical Provider, MD       Social History     Tobacco Use    Smoking status: Never Smoker    Smokeless tobacco: Never Used   Substance Use Topics    Alcohol use: Yes     Comment: socially    Drug use: No        Allergies   Allergen Reactions    Iv Dye [Iodides] Rash    Pcn [Penicillins] Rash    Shellfish Allergy Itching   ,   Past Medical History:   Diagnosis Date    Chest pain     Coronary arteriosclerosis     Degenerative well-nourished [] No apparent distress      [] Abnormal-   Mental status  [x] Alert and awake  [] Oriented to person/place/time []Able to follow commands      Eyes:  EOM    [x]  Normal  [] Abnormal-  Sclera  []  Normal  [] Abnormal -         Discharge []  None visible  [] Abnormal -    HENT:   [x] Normocephalic, atraumatic. [] Abnormal   [] Mouth/Throat: Mucous membranes are moist.     External Ears [x] Normal  [] Abnormal-     Neck: [x] No visualized mass     Pulmonary/Chest: [x] Respiratory effort normal.  [] No visualized signs of difficulty breathing or respiratory distress        [] Abnormal-      Musculoskeletal:   [x] Normal gait with no signs of ataxia         [] Normal range of motion of neck        [] Abnormal-       Neurological:        [x] No Facial Asymmetry (Cranial nerve 7 motor function) (limited exam to video visit)          [] No gaze palsy        [] Abnormal-         Skin:        [x] No significant exanthematous lesions or discoloration noted on facial skin         [] Abnormal-            Psychiatric:       [x] Normal Affect [] No Hallucinations        [] Abnormal-     Other pertinent observable physical exam findings-     ASSESSMENT/PLAN:  1. Acute non-recurrent sinusitis of other sinus  Antibiotic and cough medicine per her request.  If she is not better in 2 or 3 days to see me. If worse go to ER. If she is not better in a couple days will need a chest x-ray. She will get tested for COVID at work. A great deal of time spent reviewing medications, diet, exercise, social issues. Also reviewing the chart before entering the room with patient and finishing charting after leaving patient's room. More than half of that time was spent face to face with the patient in counseling and coordinating care. - cefdinir (OMNICEF) 300 MG capsule; Take 1 capsule by mouth 2 times daily for 10 days Ok with pcn allergy  Dispense: 20 capsule;  Refill: 0  - promethazine-codeine (PHENERGAN WITH CODEINE) 6.25-10

## 2020-05-30 ENCOUNTER — HOSPITAL ENCOUNTER (OUTPATIENT)
Dept: GENERAL RADIOLOGY | Age: 55
Discharge: HOME | End: 2020-06-01
Payer: COMMERCIAL

## 2020-05-30 ENCOUNTER — HOSPITAL ENCOUNTER (OUTPATIENT)
Age: 55
Discharge: HOME | End: 2020-06-01
Payer: COMMERCIAL

## 2020-05-30 PROCEDURE — 71046 X-RAY EXAM CHEST 2 VIEWS: CPT

## 2020-07-10 ENCOUNTER — TELEPHONE (OUTPATIENT)
Dept: PRIMARY CARE CLINIC | Age: 55
End: 2020-07-10

## 2020-07-10 NOTE — TELEPHONE ENCOUNTER
Patient never received her handicap placard.  She would like another one done and she will pick it up in the office - new order pendign

## 2020-08-07 ENCOUNTER — HOSPITAL ENCOUNTER (OUTPATIENT)
Dept: GENERAL RADIOLOGY | Age: 55
End: 2020-08-07
Payer: COMMERCIAL

## 2020-08-07 ENCOUNTER — HOSPITAL ENCOUNTER (OUTPATIENT)
Dept: GENERAL RADIOLOGY | Age: 55
Discharge: HOME | End: 2020-08-09
Payer: COMMERCIAL

## 2020-08-07 PROCEDURE — G0279 TOMOSYNTHESIS, MAMMO: HCPCS

## 2020-08-11 ENCOUNTER — TELEPHONE (OUTPATIENT)
Dept: PRIMARY CARE CLINIC | Age: 55
End: 2020-08-11

## 2020-08-11 NOTE — TELEPHONE ENCOUNTER
Notify patient because of some scar tissue they recommended a mammogram be redone in 6 months.   Have her call us and we will send the prescription over

## 2020-10-26 ENCOUNTER — VIRTUAL VISIT (OUTPATIENT)
Dept: PRIMARY CARE CLINIC | Age: 55
End: 2020-10-26
Payer: COMMERCIAL

## 2020-10-26 PROCEDURE — G8417 CALC BMI ABV UP PARAM F/U: HCPCS | Performed by: FAMILY MEDICINE

## 2020-10-26 PROCEDURE — G8484 FLU IMMUNIZE NO ADMIN: HCPCS | Performed by: FAMILY MEDICINE

## 2020-10-26 PROCEDURE — 99213 OFFICE O/P EST LOW 20 MIN: CPT | Performed by: FAMILY MEDICINE

## 2020-10-26 PROCEDURE — 3017F COLORECTAL CA SCREEN DOC REV: CPT | Performed by: FAMILY MEDICINE

## 2020-10-26 PROCEDURE — G8428 CUR MEDS NOT DOCUMENT: HCPCS | Performed by: FAMILY MEDICINE

## 2020-10-26 PROCEDURE — 1036F TOBACCO NON-USER: CPT | Performed by: FAMILY MEDICINE

## 2020-10-26 RX ORDER — PREDNISONE 10 MG/1
TABLET ORAL
Qty: 18 TABLET | Refills: 1 | Status: SHIPPED | OUTPATIENT
Start: 2020-10-26 | End: 2020-12-23

## 2020-10-26 ASSESSMENT — PATIENT HEALTH QUESTIONNAIRE - PHQ9
SUM OF ALL RESPONSES TO PHQ QUESTIONS 1-9: 0
SUM OF ALL RESPONSES TO PHQ QUESTIONS 1-9: 0
2. FEELING DOWN, DEPRESSED OR HOPELESS: 0
SUM OF ALL RESPONSES TO PHQ QUESTIONS 1-9: 0
SUM OF ALL RESPONSES TO PHQ9 QUESTIONS 1 & 2: 0
1. LITTLE INTEREST OR PLEASURE IN DOING THINGS: 0

## 2020-10-26 ASSESSMENT — ENCOUNTER SYMPTOMS
GASTROINTESTINAL NEGATIVE: 1
EYES NEGATIVE: 1
ALLERGIC/IMMUNOLOGIC NEGATIVE: 1
RESPIRATORY NEGATIVE: 1

## 2020-12-23 ENCOUNTER — VIRTUAL VISIT (OUTPATIENT)
Dept: PRIMARY CARE CLINIC | Age: 55
End: 2020-12-23
Payer: COMMERCIAL

## 2020-12-23 PROCEDURE — G8428 CUR MEDS NOT DOCUMENT: HCPCS | Performed by: FAMILY MEDICINE

## 2020-12-23 PROCEDURE — 99213 OFFICE O/P EST LOW 20 MIN: CPT | Performed by: FAMILY MEDICINE

## 2020-12-23 PROCEDURE — G8484 FLU IMMUNIZE NO ADMIN: HCPCS | Performed by: FAMILY MEDICINE

## 2020-12-23 PROCEDURE — 1036F TOBACCO NON-USER: CPT | Performed by: FAMILY MEDICINE

## 2020-12-23 PROCEDURE — 3017F COLORECTAL CA SCREEN DOC REV: CPT | Performed by: FAMILY MEDICINE

## 2020-12-23 PROCEDURE — G8417 CALC BMI ABV UP PARAM F/U: HCPCS | Performed by: FAMILY MEDICINE

## 2020-12-23 RX ORDER — DEXTROMETHORPHAN HYDROBROMIDE AND PROMETHAZINE HYDROCHLORIDE 15; 6.25 MG/5ML; MG/5ML
5 SYRUP ORAL 4 TIMES DAILY PRN
Qty: 120 ML | Refills: 0 | Status: SHIPPED | OUTPATIENT
Start: 2020-12-23 | End: 2020-12-30

## 2020-12-23 RX ORDER — PANTOPRAZOLE SODIUM 40 MG/1
40 TABLET, DELAYED RELEASE ORAL DAILY
Qty: 90 TABLET | Refills: 3 | Status: SHIPPED
Start: 2020-12-23 | End: 2022-02-17 | Stop reason: SDUPTHER

## 2020-12-23 RX ORDER — CEFDINIR 300 MG/1
300 CAPSULE ORAL 2 TIMES DAILY
Qty: 20 CAPSULE | Refills: 0 | Status: SHIPPED | OUTPATIENT
Start: 2020-12-23 | End: 2021-01-02

## 2020-12-23 RX ORDER — SIMVASTATIN 40 MG
40 TABLET ORAL NIGHTLY
Qty: 90 TABLET | Refills: 12 | Status: SHIPPED
Start: 2020-12-23 | End: 2021-02-03 | Stop reason: SDUPTHER

## 2020-12-23 RX ORDER — PREDNISONE 10 MG/1
TABLET ORAL
Qty: 18 TABLET | Refills: 0 | Status: SHIPPED
Start: 2020-12-23 | End: 2021-02-03

## 2020-12-23 RX ORDER — LEVOTHYROXINE SODIUM 0.15 MG/1
150 TABLET ORAL DAILY
Qty: 90 TABLET | Refills: 5 | Status: SHIPPED
Start: 2020-12-23 | End: 2021-02-03 | Stop reason: SDUPTHER

## 2020-12-23 RX ORDER — BUPROPION HYDROCHLORIDE 150 MG/1
150 TABLET ORAL EVERY MORNING
Qty: 90 TABLET | Refills: 5 | Status: SHIPPED
Start: 2020-12-23 | End: 2021-02-03 | Stop reason: SDUPTHER

## 2020-12-23 RX ORDER — ALBUTEROL SULFATE 90 UG/1
2 AEROSOL, METERED RESPIRATORY (INHALATION) 4 TIMES DAILY PRN
Qty: 1 INHALER | Refills: 5 | Status: SHIPPED
Start: 2020-12-23 | End: 2021-12-28 | Stop reason: ALTCHOICE

## 2020-12-23 ASSESSMENT — ENCOUNTER SYMPTOMS
EYES NEGATIVE: 1
ALLERGIC/IMMUNOLOGIC NEGATIVE: 1
COUGH: 1
GASTROINTESTINAL NEGATIVE: 1

## 2020-12-23 ASSESSMENT — PATIENT HEALTH QUESTIONNAIRE - PHQ9
SUM OF ALL RESPONSES TO PHQ QUESTIONS 1-9: 0
2. FEELING DOWN, DEPRESSED OR HOPELESS: 0
1. LITTLE INTEREST OR PLEASURE IN DOING THINGS: 0
SUM OF ALL RESPONSES TO PHQ9 QUESTIONS 1 & 2: 0
SUM OF ALL RESPONSES TO PHQ QUESTIONS 1-9: 0
SUM OF ALL RESPONSES TO PHQ QUESTIONS 1-9: 0
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
## 2021-01-05 ENCOUNTER — VIRTUAL VISIT (OUTPATIENT)
Dept: PRIMARY CARE CLINIC | Age: 56
End: 2021-01-05
Payer: COMMERCIAL

## 2021-01-05 DIAGNOSIS — J20.8 ACUTE BRONCHITIS DUE TO OTHER SPECIFIED ORGANISMS: ICD-10-CM

## 2021-01-05 DIAGNOSIS — I50.814 RIGHT-SIDED CONGESTIVE HEART FAILURE SECONDARY TO LEFT-SIDED CONGESTIVE HEART FAILURE (HCC): ICD-10-CM

## 2021-01-05 DIAGNOSIS — J01.80 ACUTE NON-RECURRENT SINUSITIS OF OTHER SINUS: Primary | ICD-10-CM

## 2021-01-05 PROCEDURE — 1036F TOBACCO NON-USER: CPT | Performed by: FAMILY MEDICINE

## 2021-01-05 PROCEDURE — G8417 CALC BMI ABV UP PARAM F/U: HCPCS | Performed by: FAMILY MEDICINE

## 2021-01-05 PROCEDURE — 99213 OFFICE O/P EST LOW 20 MIN: CPT | Performed by: FAMILY MEDICINE

## 2021-01-05 PROCEDURE — G8484 FLU IMMUNIZE NO ADMIN: HCPCS | Performed by: FAMILY MEDICINE

## 2021-01-05 PROCEDURE — G8428 CUR MEDS NOT DOCUMENT: HCPCS | Performed by: FAMILY MEDICINE

## 2021-01-05 PROCEDURE — 3017F COLORECTAL CA SCREEN DOC REV: CPT | Performed by: FAMILY MEDICINE

## 2021-01-05 RX ORDER — PREDNISONE 10 MG/1
10 TABLET ORAL
Qty: 15 TABLET | Refills: 0 | Status: SHIPPED | OUTPATIENT
Start: 2021-01-05 | End: 2021-01-10

## 2021-01-05 RX ORDER — DEXTROMETHORPHAN HYDROBROMIDE AND PROMETHAZINE HYDROCHLORIDE 15; 6.25 MG/5ML; MG/5ML
5 SYRUP ORAL 4 TIMES DAILY PRN
Qty: 120 ML | Refills: 0 | Status: SHIPPED | OUTPATIENT
Start: 2021-01-05 | End: 2021-01-12

## 2021-01-05 RX ORDER — DOXYCYCLINE HYCLATE 100 MG
100 TABLET ORAL 2 TIMES DAILY
Qty: 20 TABLET | Refills: 0 | Status: SHIPPED | OUTPATIENT
Start: 2021-01-05 | End: 2021-01-15

## 2021-01-05 ASSESSMENT — ENCOUNTER SYMPTOMS
ALLERGIC/IMMUNOLOGIC NEGATIVE: 1
COUGH: 1
GASTROINTESTINAL NEGATIVE: 1
EYES NEGATIVE: 1
SHORTNESS OF BREATH: 0
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
## 2021-01-29 ENCOUNTER — TELEPHONE (OUTPATIENT)
Dept: PRIMARY CARE CLINIC | Age: 56
End: 2021-01-29

## 2021-01-29 DIAGNOSIS — I50.814 RIGHT-SIDED CONGESTIVE HEART FAILURE SECONDARY TO LEFT-SIDED CONGESTIVE HEART FAILURE (HCC): ICD-10-CM

## 2021-01-29 DIAGNOSIS — E03.9 ACQUIRED HYPOTHYROIDISM: Primary | ICD-10-CM

## 2021-01-29 DIAGNOSIS — Z00.01 ENCOUNTER FOR ANNUAL GENERAL MEDICAL EXAMINATION WITH ABNORMAL FINDINGS IN ADULT: ICD-10-CM

## 2021-01-29 DIAGNOSIS — I10 ESSENTIAL HYPERTENSION: ICD-10-CM

## 2021-01-29 DIAGNOSIS — R73.03 PRE-DIABETES: ICD-10-CM

## 2021-01-29 DIAGNOSIS — E03.9 ACQUIRED HYPOTHYROIDISM: ICD-10-CM

## 2021-01-29 LAB
ALBUMIN SERPL-MCNC: 4.4 G/DL (ref 3.5–5.2)
ALP BLD-CCNC: 69 U/L (ref 35–104)
ALT SERPL-CCNC: 11 U/L (ref 0–32)
ANION GAP SERPL CALCULATED.3IONS-SCNC: 16 MMOL/L (ref 7–16)
AST SERPL-CCNC: 16 U/L (ref 0–31)
BASOPHILS ABSOLUTE: 0.03 E9/L (ref 0–0.2)
BASOPHILS RELATIVE PERCENT: 0.3 % (ref 0–2)
BILIRUB SERPL-MCNC: 0.4 MG/DL (ref 0–1.2)
BUN BLDV-MCNC: 15 MG/DL (ref 6–20)
CALCIUM SERPL-MCNC: 10.1 MG/DL (ref 8.6–10.2)
CHLORIDE BLD-SCNC: 99 MMOL/L (ref 98–107)
CHOLESTEROL, TOTAL: 207 MG/DL (ref 0–199)
CO2: 28 MMOL/L (ref 22–29)
CREAT SERPL-MCNC: 0.8 MG/DL (ref 0.5–1)
EOSINOPHILS ABSOLUTE: 0.03 E9/L (ref 0.05–0.5)
EOSINOPHILS RELATIVE PERCENT: 0.3 % (ref 0–6)
GFR AFRICAN AMERICAN: >60
GFR NON-AFRICAN AMERICAN: >60 ML/MIN/1.73
GLUCOSE BLD-MCNC: 82 MG/DL (ref 74–99)
HBA1C MFR BLD: 5.9 % (ref 4–5.6)
HCT VFR BLD CALC: 46 % (ref 34–48)
HDLC SERPL-MCNC: 68 MG/DL
HEMOGLOBIN: 13.6 G/DL (ref 11.5–15.5)
IMMATURE GRANULOCYTES #: 0.11 E9/L
IMMATURE GRANULOCYTES %: 1 % (ref 0–5)
LDL CHOLESTEROL CALCULATED: 98 MG/DL (ref 0–99)
LYMPHOCYTES ABSOLUTE: 2.17 E9/L (ref 1.5–4)
LYMPHOCYTES RELATIVE PERCENT: 20.3 % (ref 20–42)
MCH RBC QN AUTO: 23.5 PG (ref 26–35)
MCHC RBC AUTO-ENTMCNC: 29.6 % (ref 32–34.5)
MCV RBC AUTO: 79.6 FL (ref 80–99.9)
MONOCYTES ABSOLUTE: 0.81 E9/L (ref 0.1–0.95)
MONOCYTES RELATIVE PERCENT: 7.6 % (ref 2–12)
NEUTROPHILS ABSOLUTE: 7.55 E9/L (ref 1.8–7.3)
NEUTROPHILS RELATIVE PERCENT: 70.5 % (ref 43–80)
PDW BLD-RTO: 17.6 FL (ref 11.5–15)
PLATELET # BLD: 245 E9/L (ref 130–450)
PMV BLD AUTO: 9.6 FL (ref 7–12)
POTASSIUM SERPL-SCNC: 5 MMOL/L (ref 3.5–5)
PRO-BNP: 233 PG/ML (ref 0–125)
RBC # BLD: 5.78 E12/L (ref 3.5–5.5)
SODIUM BLD-SCNC: 143 MMOL/L (ref 132–146)
T4 TOTAL: 8.9 MCG/DL (ref 4.5–11.7)
TOTAL PROTEIN: 7.6 G/DL (ref 6.4–8.3)
TRIGL SERPL-MCNC: 203 MG/DL (ref 0–149)
TSH SERPL DL<=0.05 MIU/L-ACNC: 3.74 UIU/ML (ref 0.27–4.2)
VLDLC SERPL CALC-MCNC: 41 MG/DL
WBC # BLD: 10.7 E9/L (ref 4.5–11.5)

## 2021-02-03 ENCOUNTER — OFFICE VISIT (OUTPATIENT)
Dept: PRIMARY CARE CLINIC | Age: 56
End: 2021-02-03
Payer: COMMERCIAL

## 2021-02-03 VITALS
SYSTOLIC BLOOD PRESSURE: 142 MMHG | DIASTOLIC BLOOD PRESSURE: 82 MMHG | BODY MASS INDEX: 50.02 KG/M2 | TEMPERATURE: 98.8 F | HEIGHT: 64 IN | WEIGHT: 293 LBS

## 2021-02-03 DIAGNOSIS — I10 ESSENTIAL HYPERTENSION: Chronic | ICD-10-CM

## 2021-02-03 DIAGNOSIS — I25.10 CORONARY ARTERY DISEASE INVOLVING NATIVE CORONARY ARTERY OF NATIVE HEART WITHOUT ANGINA PECTORIS: Chronic | ICD-10-CM

## 2021-02-03 DIAGNOSIS — E78.2 MIXED HYPERLIPIDEMIA: ICD-10-CM

## 2021-02-03 DIAGNOSIS — Z00.01 ENCOUNTER FOR ANNUAL GENERAL MEDICAL EXAMINATION WITH ABNORMAL FINDINGS IN ADULT: Primary | ICD-10-CM

## 2021-02-03 DIAGNOSIS — E03.9 ACQUIRED HYPOTHYROIDISM: Chronic | ICD-10-CM

## 2021-02-03 DIAGNOSIS — I50.43 SYSTOLIC AND DIASTOLIC CHF, ACUTE ON CHRONIC (HCC): ICD-10-CM

## 2021-02-03 DIAGNOSIS — G47.33 SLEEP APNEA, OBSTRUCTIVE: ICD-10-CM

## 2021-02-03 DIAGNOSIS — F41.9 ANXIETY: ICD-10-CM

## 2021-02-03 PROCEDURE — G8484 FLU IMMUNIZE NO ADMIN: HCPCS | Performed by: FAMILY MEDICINE

## 2021-02-03 PROCEDURE — 99396 PREV VISIT EST AGE 40-64: CPT | Performed by: FAMILY MEDICINE

## 2021-02-03 RX ORDER — ATENOLOL 25 MG/1
25 TABLET ORAL 3 TIMES DAILY
Qty: 270 TABLET | Refills: 5 | Status: SHIPPED
Start: 2021-02-03 | End: 2022-01-10 | Stop reason: SDUPTHER

## 2021-02-03 RX ORDER — POTASSIUM CHLORIDE 20 MEQ/1
20 TABLET, EXTENDED RELEASE ORAL DAILY
Qty: 90 TABLET | Refills: 12 | Status: SHIPPED
Start: 2021-02-03 | End: 2022-02-17 | Stop reason: SDUPTHER

## 2021-02-03 RX ORDER — LEVOTHYROXINE SODIUM 0.15 MG/1
150 TABLET ORAL DAILY
Qty: 90 TABLET | Refills: 5 | Status: SHIPPED
Start: 2021-02-03 | End: 2022-02-17 | Stop reason: SDUPTHER

## 2021-02-03 RX ORDER — SIMVASTATIN 40 MG
40 TABLET ORAL NIGHTLY
Qty: 90 TABLET | Refills: 12 | Status: SHIPPED
Start: 2021-02-03 | End: 2022-02-17 | Stop reason: SDUPTHER

## 2021-02-03 RX ORDER — TORSEMIDE 10 MG/1
10 TABLET ORAL DAILY
Qty: 90 TABLET | Refills: 5 | Status: SHIPPED
Start: 2021-02-03 | End: 2022-02-17 | Stop reason: SDUPTHER

## 2021-02-03 RX ORDER — BUPROPION HYDROCHLORIDE 150 MG/1
150 TABLET ORAL EVERY MORNING
Qty: 90 TABLET | Refills: 5 | Status: SHIPPED
Start: 2021-02-03 | End: 2022-02-17 | Stop reason: SDUPTHER

## 2021-02-03 ASSESSMENT — ENCOUNTER SYMPTOMS
GASTROINTESTINAL NEGATIVE: 1
EYES NEGATIVE: 1
ALLERGIC/IMMUNOLOGIC NEGATIVE: 1
SHORTNESS OF BREATH: 1

## 2021-02-03 ASSESSMENT — PATIENT HEALTH QUESTIONNAIRE - PHQ9
SUM OF ALL RESPONSES TO PHQ QUESTIONS 1-9: 0
1. LITTLE INTEREST OR PLEASURE IN DOING THINGS: 0
SUM OF ALL RESPONSES TO PHQ9 QUESTIONS 1 & 2: 0
2. FEELING DOWN, DEPRESSED OR HOPELESS: 0
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
## 2021-02-17 ENCOUNTER — HOSPITAL ENCOUNTER (OUTPATIENT)
Dept: SLEEP CENTER | Age: 56
Discharge: HOME | End: 2021-02-17
Payer: COMMERCIAL

## 2021-02-17 PROCEDURE — G0399 HOME SLEEP TEST/TYPE 3 PORTA: HCPCS

## 2021-02-19 ENCOUNTER — TELEPHONE (OUTPATIENT)
Dept: CARDIOLOGY | Age: 56
End: 2021-02-19

## 2021-02-19 NOTE — PROGRESS NOTES
[address] [address]                               SLEEP STUDY REPORT    PATIENT NAME: Britton Caballero                  :        1965  MED REC NO:   [id number]                            ROOM:  ACCOUNT NO:   [de-identified]                           ADMIT DATE: 2021  PROVIDER:     Luis Leary MD    DATE OF STUDY:  2021    REFERRING PROVIDER:  Jovita Clarke DO    STUDY PERFORMED:  Polysomnography. INDICATION FOR STUDY  Witnessed apnea, excessive napping, wakes gasping,  snore, restless sleep, morning headaches, and trouble with  memory/concentration. CURRENT MEDICATIONS:  Atenolol, Demadex, Zocor, Synthroid, pantoprazole,  bupropion, and Klor-Con. INTERPRETATION:  This sleep study was performed as a home sleep apnea  test.  An ApneaLink air monitoring device was utilized for the test.   Total recording time was 7 hours and 44 minutes. Flow evaluation time  was 7 hours and 31 minutes and oxygen saturation evaluation time was 7  hours and 16 minutes. RESPIRATION SUMMARY:  APNEA:  There were 70 apneic events, which were all obstructive. HYPOPNEA:  There were 336 hypopneic events. RESPIRATORY EVENT INDEX:  The respiratory event index is 54. OXYGEN SATURATION:  Baseline oxygen saturation was 91%. Lowest  saturation was 40%. The patient spent 100% of the time with saturation  less than 90% and 56% of the time with saturation less than 80%. HEART RATE SUMMARY:  Average heart rate was 71 beats per minute. Maximum heart rate was 90 beats per minute and minimum heart rate was 52  beats per minute. FLOW LIMITATION:  Flow limitation breaths without snore occurred 580  times. Flow limitation breaths with snore occurred 241 times. There  were 4466 snore events. Snoring was loud. MISCELLANEOUS:  [place] Sleepiness Scale score is 15/24. BMI is 61 kg  per meter squared.   Neck

## 2021-02-19 NOTE — TELEPHONE ENCOUNTER
CALLED PT TO SCHEDULED ECHO FOR 03-08-21. REVIEWED COVID CHECKLIST WITH PATIENT.     Electronically signed by Dara Castaneda on 2/19/2021 at 9:09 AM

## 2021-02-24 ENCOUNTER — HOSPITAL ENCOUNTER (OUTPATIENT)
Dept: SLEEP CENTER | Age: 56
Discharge: HOME | End: 2021-02-24
Payer: COMMERCIAL

## 2021-02-24 DIAGNOSIS — G47.33 OSA (OBSTRUCTIVE SLEEP APNEA): ICD-10-CM

## 2021-02-24 PROCEDURE — 95811 POLYSOM 6/>YRS CPAP 4/> PARM: CPT

## 2021-02-25 VITALS
OXYGEN SATURATION: 92 % | BODY MASS INDEX: 50.02 KG/M2 | DIASTOLIC BLOOD PRESSURE: 85 MMHG | HEART RATE: 69 BPM | SYSTOLIC BLOOD PRESSURE: 148 MMHG | TEMPERATURE: 96.7 F | WEIGHT: 293 LBS | HEIGHT: 64 IN

## 2021-02-26 NOTE — PROGRESS NOTES
[address] [address]                               SLEEP STUDY REPORT    PATIENT NAME: Constanza Dykes                  :        1965  MED REC NO:   [id number]                            ROOM:  ACCOUNT NO:   [de-identified]                           ADMIT DATE: 2021  PROVIDER:     Andrea Sharp MD    DATE OF STUDY:  2021    REFERRING PROVIDER:   Patient of mine. INDICATION FOR POLYSOMNOGRAPHY:  Known sleep apnea - for positive airway  pressure titration. CURRENT MEDICATIONS:  Atenolol, Demadex, Zocor, Synthroid, pantoprazole,  bupropion, Klor-Con. INTERPRETATION:    SLEEP ARCHITECTURE:  This patient had a total time in bed of 374  minutes. Total sleep time was 327 minutes. Sleep efficiency was 87%. Sleep latency was 13 minutes and REM latency was 57 minutes. SLEEP STAGING:  The patient was awake 13% of the time in bed. Stage N1  was 9% and N2 was 66% of the total sleep time. Slow wave sleep was 3%  of the sleep time, and stage REM sleep was 22% of the sleep time. RESPIRATION SUMMARY:  APNEA:  There were 8 apneic events including 2 central, 4 obstructive,  and 2 mixed. Two events occurred during REM stage sleep, and maximum  duration was 20 seconds. HYPOPNEA:  There were 102 hypopneic events, 18 occurred during REM stage  sleep, and maximum duration was 105 seconds. APNEA/HYPOPNEA INDEX:  The apnea/hypopnea index is 20. Of the 110  respiratory events, 92 occurred in the supine position. TITRATION OF POSITIVE AIRWAY PRESSURE:  This patient was started on a  CPAP of 6, which was gradually increased to 15 before changing to  bi-level. Bi-level was increased to a maximum of 26/22 cm of water  pressure. At that level, the patient slept for 34 minutes, which was  all non-REM stage sleep. The apnea/hypopnea index was 5.     AROUSAL ANALYSIS:  There were 32 arousals/awakenings, the sleep disruption index is normal.    LIMB MOVEMENT SUMMARY:  There were 399 limb movements, the limb movement  index was 73 (normal less than 15). OXYGEN SATURATION:  Average oxygen saturation while awake was 90%. Lowest saturation was 69%. The patient spent 14% of the time with  saturation at or greater than 90%. 79% of the time, saturation ranged  from 80% to 89%; and 7% of the time, saturation was less than 80%. HEART RATE SUMMARY:  Average heart rate while awake was 73 beats per  minute. Maximum heart rate during sleep was 91 beats per minute, and  minimum heart rate during sleep was 46 beats per minute. There were  heart rate decelerations associated with respiratory events. MISCELLANEOUS:  [place] Sleepiness Scale score is 15/24. Snoring was  moderate. This was graded as 5 on a 1 to 10 scale. Snoring was  eliminated. There was no apparent bruxism. IMPRESSION:  1. Known obstructive sleep apnea. 2.  Good titration with positive airway pressure. 3.  Moderate snoring eliminated with positive airway pressure. 4.  Nocturnal hypoxia essentially eliminated at final bi-level setting. 5.  Heart rate decelerations with respiratory events including frequent  PACs and PVCs. 6.  Increased limb movement    DISCUSSION:  On a previous study, this patient was found to have severe  obstructive sleep apnea with significant nocturnal hypoxia. With  titration of positive airway pressure, which the patient tolerated well,  good results were achieved with normalization of the apnea/hypopnea  index, elimination of snoring, and at the final setting, normalization  of oxygen saturation. If the increased limb movement persists once the   patient is treated for the sleep apnea, further evaluation/treatment   should be considered. PLAN:  1. Bi-level at 26/22 cm of water pressure. 2.  [employer] Simplus full facemask, size medium with heated  humidification. 3.  The patient to be seen in 6 to 10 weeks. 4.  When the patient is seen, the increased limb movement will be discussed.          Kieran Zimmerman MD  Diplomat of Sleep Medicine    D: 02/25/2021 19:18:19       T: 02/25/2021 19:26:42     [email or web address]  Job#: [id number]     Doc#: [id number]    CC:

## 2021-03-08 ENCOUNTER — HOSPITAL ENCOUNTER (OUTPATIENT)
Dept: CARDIOLOGY | Age: 56
Discharge: HOME | End: 2021-03-08
Payer: COMMERCIAL

## 2021-03-08 ENCOUNTER — TELEPHONE (OUTPATIENT)
Dept: CARDIOLOGY CLINIC | Age: 56
End: 2021-03-08

## 2021-03-08 ENCOUNTER — OFFICE VISIT (OUTPATIENT)
Dept: CARDIOLOGY CLINIC | Age: 56
End: 2021-03-08
Payer: COMMERCIAL

## 2021-03-08 VITALS
DIASTOLIC BLOOD PRESSURE: 78 MMHG | HEART RATE: 75 BPM | RESPIRATION RATE: 16 BRPM | SYSTOLIC BLOOD PRESSURE: 118 MMHG | WEIGHT: 293 LBS | BODY MASS INDEX: 50.02 KG/M2 | HEIGHT: 64 IN

## 2021-03-08 DIAGNOSIS — R06.09 DOE (DYSPNEA ON EXERTION): ICD-10-CM

## 2021-03-08 DIAGNOSIS — I50.43 SYSTOLIC AND DIASTOLIC CHF, ACUTE ON CHRONIC (HCC): ICD-10-CM

## 2021-03-08 DIAGNOSIS — I25.10 CORONARY ARTERY DISEASE INVOLVING NATIVE CORONARY ARTERY OF NATIVE HEART WITHOUT ANGINA PECTORIS: Primary | Chronic | ICD-10-CM

## 2021-03-08 DIAGNOSIS — I25.10 CORONARY ARTERY DISEASE INVOLVING NATIVE CORONARY ARTERY OF NATIVE HEART WITHOUT ANGINA PECTORIS: Chronic | ICD-10-CM

## 2021-03-08 LAB
LV EF: 58 %
LVEF MODALITY: NORMAL

## 2021-03-08 PROCEDURE — 93306 TTE W/DOPPLER COMPLETE: CPT

## 2021-03-08 PROCEDURE — 93000 ELECTROCARDIOGRAM COMPLETE: CPT | Performed by: INTERNAL MEDICINE

## 2021-03-08 PROCEDURE — 3017F COLORECTAL CA SCREEN DOC REV: CPT | Performed by: INTERNAL MEDICINE

## 2021-03-08 PROCEDURE — G8484 FLU IMMUNIZE NO ADMIN: HCPCS | Performed by: INTERNAL MEDICINE

## 2021-03-08 PROCEDURE — 1036F TOBACCO NON-USER: CPT | Performed by: INTERNAL MEDICINE

## 2021-03-08 PROCEDURE — G8417 CALC BMI ABV UP PARAM F/U: HCPCS | Performed by: INTERNAL MEDICINE

## 2021-03-08 PROCEDURE — G8427 DOCREV CUR MEDS BY ELIG CLIN: HCPCS | Performed by: INTERNAL MEDICINE

## 2021-03-08 PROCEDURE — 99213 OFFICE O/P EST LOW 20 MIN: CPT | Performed by: INTERNAL MEDICINE

## 2021-03-08 NOTE — PATIENT INSTRUCTIONS
1. Echo results were reviewed, image quality is suboptimal.  LV function appears normal RV was not visualized. We will schedule for a cardiac MRI without contrast to assess both LV and RV function. 2. She was also advised to practice intermittent fasting along with Metformin 500 mg p.o. twice daily for weight loss management. 3. Continue BiPAP for sleep apnea syndrome. 4. Continue Tenormin for palpitations  5. Continue Simvastatin  6. Restrict calorie and continue regular exercise program  7.  Follow up with me in 6 months

## 2021-03-08 NOTE — TELEPHONE ENCOUNTER
Per Dr. Phillip Lawrence, patient needs Cardiac MRI without contrast (ok at [place]) re: DAMON and suboptimal echo. Patient is over the weight limit for [facility] ([facility] Can be done at [place] [address] #[id number] valid 3/8/21-9/4/21. Spoke with patient. Patient preferred to call [facility] Scheduling herself to schedule as her availability is complicated.
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
## 2021-03-09 ENCOUNTER — TELEPHONE (OUTPATIENT)
Dept: PRIMARY CARE CLINIC | Age: 56
End: 2021-03-09

## 2021-03-10 ENCOUNTER — VIRTUAL VISIT (OUTPATIENT)
Dept: PRIMARY CARE CLINIC | Age: 56
End: 2021-03-10
Payer: COMMERCIAL

## 2021-03-10 DIAGNOSIS — M54.50 ACUTE BILATERAL LOW BACK PAIN WITHOUT SCIATICA: ICD-10-CM

## 2021-03-10 DIAGNOSIS — G47.33 SLEEP APNEA, OBSTRUCTIVE: Chronic | ICD-10-CM

## 2021-03-10 DIAGNOSIS — E11.9 DIET-CONTROLLED DIABETES MELLITUS (HCC): Primary | ICD-10-CM

## 2021-03-10 DIAGNOSIS — I10 ESSENTIAL HYPERTENSION: Chronic | ICD-10-CM

## 2021-03-10 PROCEDURE — 3017F COLORECTAL CA SCREEN DOC REV: CPT | Performed by: FAMILY MEDICINE

## 2021-03-10 PROCEDURE — G8428 CUR MEDS NOT DOCUMENT: HCPCS | Performed by: FAMILY MEDICINE

## 2021-03-10 PROCEDURE — 3044F HG A1C LEVEL LT 7.0%: CPT | Performed by: FAMILY MEDICINE

## 2021-03-10 PROCEDURE — 99214 OFFICE O/P EST MOD 30 MIN: CPT | Performed by: FAMILY MEDICINE

## 2021-03-10 PROCEDURE — G8417 CALC BMI ABV UP PARAM F/U: HCPCS | Performed by: FAMILY MEDICINE

## 2021-03-10 PROCEDURE — G8484 FLU IMMUNIZE NO ADMIN: HCPCS | Performed by: FAMILY MEDICINE

## 2021-03-10 PROCEDURE — 1036F TOBACCO NON-USER: CPT | Performed by: FAMILY MEDICINE

## 2021-03-10 PROCEDURE — 2022F DILAT RTA XM EVC RTNOPTHY: CPT | Performed by: FAMILY MEDICINE

## 2021-03-10 RX ORDER — BACLOFEN 10 MG/1
10 TABLET ORAL NIGHTLY
Qty: 30 TABLET | Refills: 1 | Status: SHIPPED
Start: 2021-03-10 | End: 2021-11-18 | Stop reason: ALTCHOICE

## 2021-03-10 RX ORDER — PREDNISONE 10 MG/1
10 TABLET ORAL
Qty: 15 TABLET | Refills: 0 | Status: SHIPPED | OUTPATIENT
Start: 2021-03-10 | End: 2021-03-15

## 2021-03-10 ASSESSMENT — PATIENT HEALTH QUESTIONNAIRE - PHQ9
SUM OF ALL RESPONSES TO PHQ QUESTIONS 1-9: 0
1. LITTLE INTEREST OR PLEASURE IN DOING THINGS: 0
SUM OF ALL RESPONSES TO PHQ QUESTIONS 1-9: 0

## 2021-03-10 ASSESSMENT — ENCOUNTER SYMPTOMS
EYES NEGATIVE: 1
RESPIRATORY NEGATIVE: 1
ALLERGIC/IMMUNOLOGIC NEGATIVE: 1
GASTROINTESTINAL NEGATIVE: 1

## 2021-03-10 NOTE — PROGRESS NOTES
3/10/21  Name: Barney Sanchez    : 1965    Sex: female    Age: 63 y.o. Subjective:    eMedicine Video Visit    This visit was performed as a virtual video visit using a synchronous, two-way, audio-video telehealth technology platform. Patient identification was verified at the start of the visit, including the patient's telephone number and physical location. I discussed with the patient the nature of our telehealth visits, that:     1. Due to the nature of an audio- video modality, the only components of a physical exam that could be done are the elements supported by direct observation. 2. I would evaluate the patient and recommend diagnostics and treatments based on my assessment. 3. If it was felt that the patient should be evaluated in clinic or an emergency room setting, then they would be directed there. 4. Our sessions are not being recorded and that personal health information is protected. 5. Our team would provide follow up care in person if/when the patient needs it. Patient does agree to proceed with telemedicine consultation. Patient's location: home address in [facility]  Physician  location home address in [facility] other people involved in call [facility]      Time spent: Greater than Not billed by time    This visit was completed virtually using Doxy. me       Patient has requested an audio/video evaluation for the following concern(s):    Sleep apnea  chf    Patient requested a virtual visit to discuss her cardiology appointment and her sleep study. She did get her CPAP machine delivered at [facility]  and doing remarkably better wearing it all night long. She saw cardiology and he advised Metformin 500 twice daily for weight loss and other issues. He was against using Adipex. He also advised no Tylenol because it could cause swelling. He mentioned her tramadol I informed her that that is an addictive drug she does not want to use it.   She has some low back pain from sleeping semisolid at nighttime she asked for prednisone just for a few days and I advised a muscle action with her BiPAP which would be safer few days. Those were sent. I told her we need to see her in 3 months with blood work a week before        Review of Systems   Constitutional: Negative. HENT: Negative. Eyes: Negative. Respiratory: Negative. Cardiovascular: Negative. Gastrointestinal: Negative. Endocrine: Negative. Genitourinary: Negative. Musculoskeletal: Negative. Skin: Negative. Allergic/Immunologic: Negative. Neurological: Negative. Hematological: Negative. Psychiatric/Behavioral: Negative.           Current Outpatient Medications:     metFORMIN (GLUCOPHAGE) 500 MG tablet, Take 1 tablet by mouth 2 times daily (with meals), Disp: 180 tablet, Rfl: 5    predniSONE (DELTASONE) 10 MG tablet, Take 1 tablet by mouth 3 times daily (with meals) for 5 days, Disp: 15 tablet, Rfl: 0    baclofen (LIORESAL) 10 MG tablet, Take 1 tablet by mouth nightly, Disp: 30 tablet, Rfl: 1    torsemide (DEMADEX) 10 MG tablet, Take 1 tablet by mouth daily, Disp: 90 tablet, Rfl: 5    atenolol (TENORMIN) 25 MG tablet, Take 1 tablet by mouth 3 times daily, Disp: 270 tablet, Rfl: 5    buPROPion (WELLBUTRIN XL) 150 MG extended release tablet, Take 1 tablet by mouth every morning, Disp: 90 tablet, Rfl: 5    levothyroxine (SYNTHROID) 150 MCG tablet, Take 1 tablet by mouth daily, Disp: 90 tablet, Rfl: 5    potassium chloride (KLOR-CON M20) 20 MEQ extended release tablet, Take 1 tablet by mouth daily, Disp: 90 tablet, Rfl: 12    simvastatin (ZOCOR) 40 MG tablet, Take 1 tablet by mouth nightly, Disp: 90 tablet, Rfl: 12    albuterol sulfate HFA (VENTOLIN HFA) 108 (90 Base) MCG/ACT inhaler, Inhale 2 puffs into the lungs 4 times daily as needed for Wheezing, Disp: 1 Inhaler, Rfl: 5    pantoprazole (PROTONIX) 40 MG tablet, Take 1 tablet by mouth daily, Disp: 90 tablet, Rfl: 3    aspirin 81 MG chewable tablet, Take 81 mg by mouth daily , Disp: , Rfl:   Allergies   Allergen Reactions    Iv Dye [Iodides] Rash    Pcn [Penicillins] Rash    Shellfish Allergy Itching       Social History     Socioeconomic History    Marital status:      Spouse name: Not on file    Number of children: Not on file    Years of education: Not on file    Highest education level: Not on file   Occupational History    Not on file   Social Needs    Financial resource strain: Not on file    Food insecurity     Worry: Not on file     Inability: Not on file    Transportation needs     Medical: Not on file     Non-medical: Not on file   Tobacco Use    Smoking status: Never Smoker    Smokeless tobacco: Never Used   Substance and Sexual Activity    Alcohol use: Yes     Comment: socially    Drug use: No    Sexual activity: Not on file   Lifestyle    Physical activity     Days per week: Not on file     Minutes per session: Not on file    Stress: Not on file   Relationships    Social connections     Talks on phone: Not on file     Gets together: Not on file     Attends Restoration service: Not on file     Active member of club or organization: Not on file     Attends meetings of clubs or organizations: Not on file     Relationship status: Not on file    Intimate partner violence     Fear of current or ex partner: Not on file     Emotionally abused: Not on file     Physically abused: Not on file     Forced sexual activity: Not on file   Other Topics Concern    Not on file   Social History Narrative        Problem List: Athscl heart disease of native coronary artery w/o ang pctrs, Peptic reflux disease,    Dysthymia, Conduction disorder of the heart, Degenerative joint disease involving multiple joints,    Coronary arteriosclerosis, Hypothyroidism, Mixed hyperlipidemia    LIPID    HYPOTHYROID    OBESITY    CAD WITH MI--98    PALPITATIONS    ANEMIA    TWO KIDS    DIV 11-12    [facility] SURGERY Objective:    Physical Exam  Constitutional:       General: She is not in acute distress. Appearance: Normal appearance. HENT:      Head: Normocephalic. Right Ear: External ear normal.      Left Ear: External ear normal.   Eyes:      General:         Right eye: No discharge. Left eye: No discharge. Extraocular Movements: Extraocular movements intact. Neck:      Musculoskeletal: Neck supple. No neck rigidity. Pulmonary:      Effort: Pulmonary effort is normal. No respiratory distress. Musculoskeletal: Normal range of motion. Skin:     Findings: No bruising or rash. Neurological:      Mental Status: She is alert and oriented to person, place, and time. Psychiatric:         Mood and Affect: Mood and affect normal.         Speech: Speech normal.         Behavior: Behavior normal. Behavior is cooperative. Thought Content: Thought content normal.         Judgment: Judgment normal.         Elva Ramirez was seen today for results. Diagnoses and all orders for this visit:    Diet-controlled diabetes mellitus ([address]  -     metFORMIN (GLUCOPHAGE) 500 MG tablet; Take 1 tablet by mouth 2 times daily (with meals)  -     CBC Auto Differential; Future  -     Comprehensive Metabolic Panel; Future  -     Hemoglobin A1C; Future  -     Lipid Panel; Future    Acute bilateral low back pain without sciatica  -     predniSONE (DELTASONE) 10 MG tablet; Take 1 tablet by mouth 3 times daily (with meals) for 5 days  -     baclofen (LIORESAL) 10 MG tablet; Take 1 tablet by mouth nightly    Sleep apnea, obstructive    Essential hypertension  -     CBC Auto Differential; Future  -     Comprehensive Metabolic Panel; Future  -     Hemoglobin A1C; Future  -     Lipid Panel; Future        Comments: Aggressive low-fat low sugar diet. Exercise. Continue BiPAP machine. Lose weight. Add Metformin twice a day not to skip meals or to be taken before meals.   We will do blood work in 3 months see me a week later.  If any symptoms notify. Meds for back for short period of time. Offered pain management she will consider. A great deal of time spent reviewing medications, diet, exercise, social issues. Also reviewing the chart before entering the room with patient and finishing charting after leaving patient's room. More than half of that time was spent face to face with the patient in counseling and coordinating care. Ck bp daily  Wt daily    follow-up with pulmonary and cardiology       The patient is being evaluated by a Virtual Visit (video visit) encounter to address concerns as mentioned above. A caregiver was present when appropriate. Due to this being a TeleHealth encounter (During EOPWW-63 public [facility] emergency), evaluation of the following organ systems was limited: Vitals/Constitutional/EENT/Resp/CV/GI//MS/Neuro/Skin/Heme-Lymph-Imm. Pursuant to the emergency declaration under the [address] [place] [address], [address] [place] [address] authority and the [employer] and [employer] Act, this Virtual Visit was conducted with patient's (and/or legal guardian's) consent, to reduce the patient's risk of exposure to COVID-19 and provide necessary medical care. The patient (and/or legal guardian) has also been advised to contact this office for worsening conditions or problems, and seek emergency medical treatment and/or call 911 if deemed necessary. Patient identification was verified at the start of the visit: Yes    Total time spent on this encounter: Not billed by time    Services were provided through a video synchronous discussion virtually to substitute for in-person clinic visit. Patient and provider were located at their individual homes. Follow Up: Return in about 3 months (around 6/10/2021) for Lab Before.      Seen by:  Meenu Guzman DO

## 2021-04-28 ENCOUNTER — TELEPHONE (OUTPATIENT)
Dept: CARDIOLOGY CLINIC | Age: 56
End: 2021-04-28

## 2021-08-04 DIAGNOSIS — I10 ESSENTIAL HYPERTENSION: Chronic | ICD-10-CM

## 2021-08-04 DIAGNOSIS — E11.9 DIET-CONTROLLED DIABETES MELLITUS (HCC): ICD-10-CM

## 2021-08-04 LAB
ALBUMIN SERPL-MCNC: 4.4 G/DL (ref 3.5–5.2)
ALP BLD-CCNC: 76 U/L (ref 35–104)
ALT SERPL-CCNC: 17 U/L (ref 0–32)
ANION GAP SERPL CALCULATED.3IONS-SCNC: 16 MMOL/L (ref 7–16)
AST SERPL-CCNC: 17 U/L (ref 0–31)
BASOPHILS ABSOLUTE: 0.05 E9/L (ref 0–0.2)
BASOPHILS RELATIVE PERCENT: 0.7 % (ref 0–2)
BILIRUB SERPL-MCNC: 0.5 MG/DL (ref 0–1.2)
BUN BLDV-MCNC: 14 MG/DL (ref 6–20)
CALCIUM SERPL-MCNC: 10.5 MG/DL (ref 8.6–10.2)
CHLORIDE BLD-SCNC: 102 MMOL/L (ref 98–107)
CHOLESTEROL, TOTAL: 165 MG/DL (ref 0–199)
CO2: 25 MMOL/L (ref 22–29)
CREAT SERPL-MCNC: 0.9 MG/DL (ref 0.5–1)
EOSINOPHILS ABSOLUTE: 0.04 E9/L (ref 0.05–0.5)
EOSINOPHILS RELATIVE PERCENT: 0.5 % (ref 0–6)
GFR AFRICAN AMERICAN: >60
GFR NON-AFRICAN AMERICAN: >60 ML/MIN/1.73
GLUCOSE BLD-MCNC: 98 MG/DL (ref 74–99)
HBA1C MFR BLD: 5.5 % (ref 4–5.6)
HCT VFR BLD CALC: 42.5 % (ref 34–48)
HDLC SERPL-MCNC: 50 MG/DL
HEMOGLOBIN: 12.7 G/DL (ref 11.5–15.5)
IMMATURE GRANULOCYTES #: 0.03 E9/L
IMMATURE GRANULOCYTES %: 0.4 % (ref 0–5)
LDL CHOLESTEROL CALCULATED: 72 MG/DL (ref 0–99)
LYMPHOCYTES ABSOLUTE: 1.49 E9/L (ref 1.5–4)
LYMPHOCYTES RELATIVE PERCENT: 20.3 % (ref 20–42)
MCH RBC QN AUTO: 22.5 PG (ref 26–35)
MCHC RBC AUTO-ENTMCNC: 29.9 % (ref 32–34.5)
MCV RBC AUTO: 75.2 FL (ref 80–99.9)
MONOCYTES ABSOLUTE: 0.51 E9/L (ref 0.1–0.95)
MONOCYTES RELATIVE PERCENT: 7 % (ref 2–12)
NEUTROPHILS ABSOLUTE: 5.21 E9/L (ref 1.8–7.3)
NEUTROPHILS RELATIVE PERCENT: 71.1 % (ref 43–80)
PDW BLD-RTO: 16.5 FL (ref 11.5–15)
PLATELET # BLD: 242 E9/L (ref 130–450)
PMV BLD AUTO: 10.2 FL (ref 7–12)
POTASSIUM SERPL-SCNC: 4.5 MMOL/L (ref 3.5–5)
RBC # BLD: 5.65 E12/L (ref 3.5–5.5)
SODIUM BLD-SCNC: 143 MMOL/L (ref 132–146)
TOTAL PROTEIN: 7.4 G/DL (ref 6.4–8.3)
TRIGL SERPL-MCNC: 217 MG/DL (ref 0–149)
VLDLC SERPL CALC-MCNC: 43 MG/DL
WBC # BLD: 7.3 E9/L (ref 4.5–11.5)

## 2021-08-06 ENCOUNTER — TELEMEDICINE (OUTPATIENT)
Dept: PRIMARY CARE CLINIC | Age: 56
End: 2021-08-06
Payer: COMMERCIAL

## 2021-08-06 DIAGNOSIS — I25.10 CORONARY ARTERY DISEASE INVOLVING NATIVE CORONARY ARTERY OF NATIVE HEART WITHOUT ANGINA PECTORIS: Chronic | ICD-10-CM

## 2021-08-06 DIAGNOSIS — E78.2 MIXED HYPERLIPIDEMIA: Chronic | ICD-10-CM

## 2021-08-06 DIAGNOSIS — K65.4 MESENTERIC PANNICULITIS (HCC): ICD-10-CM

## 2021-08-06 DIAGNOSIS — R10.32 LEFT LOWER QUADRANT ABDOMINAL PAIN: ICD-10-CM

## 2021-08-06 DIAGNOSIS — E66.01 MORBID (SEVERE) OBESITY DUE TO EXCESS CALORIES (HCC): ICD-10-CM

## 2021-08-06 DIAGNOSIS — E03.9 ACQUIRED HYPOTHYROIDISM: Chronic | ICD-10-CM

## 2021-08-06 DIAGNOSIS — I10 ESSENTIAL HYPERTENSION: Primary | Chronic | ICD-10-CM

## 2021-08-06 DIAGNOSIS — R73.03 PRE-DIABETES: ICD-10-CM

## 2021-08-06 DIAGNOSIS — Z00.01 ENCOUNTER FOR ANNUAL GENERAL MEDICAL EXAMINATION WITH ABNORMAL FINDINGS IN ADULT: ICD-10-CM

## 2021-08-06 DIAGNOSIS — M81.0 AGE-RELATED OSTEOPOROSIS WITHOUT CURRENT PATHOLOGICAL FRACTURE: ICD-10-CM

## 2021-08-06 PROCEDURE — 99214 OFFICE O/P EST MOD 30 MIN: CPT | Performed by: FAMILY MEDICINE

## 2021-08-06 PROCEDURE — G8428 CUR MEDS NOT DOCUMENT: HCPCS | Performed by: FAMILY MEDICINE

## 2021-08-06 PROCEDURE — 3017F COLORECTAL CA SCREEN DOC REV: CPT | Performed by: FAMILY MEDICINE

## 2021-08-06 PROCEDURE — 1036F TOBACCO NON-USER: CPT | Performed by: FAMILY MEDICINE

## 2021-08-06 PROCEDURE — G8417 CALC BMI ABV UP PARAM F/U: HCPCS | Performed by: FAMILY MEDICINE

## 2021-08-06 ASSESSMENT — PATIENT HEALTH QUESTIONNAIRE - PHQ9
SUM OF ALL RESPONSES TO PHQ QUESTIONS 1-9: 0
1. LITTLE INTEREST OR PLEASURE IN DOING THINGS: 0
SUM OF ALL RESPONSES TO PHQ9 QUESTIONS 1 & 2: 0
2. FEELING DOWN, DEPRESSED OR HOPELESS: 0
SUM OF ALL RESPONSES TO PHQ QUESTIONS 1-9: 0
SUM OF ALL RESPONSES TO PHQ QUESTIONS 1-9: 0

## 2021-08-06 ASSESSMENT — ENCOUNTER SYMPTOMS
EYES NEGATIVE: 1
ALLERGIC/IMMUNOLOGIC NEGATIVE: 1
GASTROINTESTINAL NEGATIVE: 1
RESPIRATORY NEGATIVE: 1

## 2021-08-06 NOTE — PROGRESS NOTES
21  Name: Penny Harp    : 1965    Sex: female    Age: 61 y.o. Subjective:    eMedicine Video Visit    This visit was performed as a virtual video visit using a synchronous, two-way, audio-video telehealth technology platform. Patient identification was verified at the start of the visit, including the patient's telephone number and physical location. I discussed with the patient the nature of our telehealth visits, that:     1. Due to the nature of an audio- video modality, the only components of a physical exam that could be done are the elements supported by direct observation. 2. I would evaluate the patient and recommend diagnostics and treatments based on my assessment. 3. If it was felt that the patient should be evaluated in clinic or an emergency room setting, then they would be directed there. 4. Our sessions are not being recorded and that personal health information is protected. 5. Our team would provide follow up care in person if/when the patient needs it. Patient does agree to proceed with telemedicine consultation. Patient's location: home address in [facility]  Physician  location home address in [facility] other people involved in call [facility]      Time spent: Greater than Not billed by time    This visit was completed virtually using Doxy. me       Patient has requested an audio/video evaluation for the following concern(s):    6 mo ck   Re   Sleep apnea    chf   Chol     No  Cp o r sob         Feel ok   No  Cp or sob     Wearing  Auto pap and doing great with it  No  Cp or sob  Both knees sore  abd pain at times  And she saw rock and pt put off work up   She is wiling to se someone  But  Worries about getting an anesthetic      Review of Systems   Constitutional: Negative. HENT: Negative. Eyes: Negative. Respiratory: Negative. Cardiovascular: Negative. Gastrointestinal: Negative. Endocrine: Negative. Genitourinary: Negative. Musculoskeletal: Positive for arthralgias. Skin: Negative. Allergic/Immunologic: Negative. Neurological: Negative. Hematological: Negative. Psychiatric/Behavioral: Negative.           Current Outpatient Medications:     metFORMIN (GLUCOPHAGE) 500 MG tablet, Take 1 tablet by mouth 2 times daily (with meals), Disp: 180 tablet, Rfl: 5    baclofen (LIORESAL) 10 MG tablet, Take 1 tablet by mouth nightly (Patient taking differently: Take 10 mg by mouth nightly as needed ), Disp: 30 tablet, Rfl: 1    torsemide (DEMADEX) 10 MG tablet, Take 1 tablet by mouth daily, Disp: 90 tablet, Rfl: 5    atenolol (TENORMIN) 25 MG tablet, Take 1 tablet by mouth 3 times daily, Disp: 270 tablet, Rfl: 5    buPROPion (WELLBUTRIN XL) 150 MG extended release tablet, Take 1 tablet by mouth every morning, Disp: 90 tablet, Rfl: 5    levothyroxine (SYNTHROID) 150 MCG tablet, Take 1 tablet by mouth daily, Disp: 90 tablet, Rfl: 5    potassium chloride (KLOR-CON M20) 20 MEQ extended release tablet, Take 1 tablet by mouth daily, Disp: 90 tablet, Rfl: 12    simvastatin (ZOCOR) 40 MG tablet, Take 1 tablet by mouth nightly, Disp: 90 tablet, Rfl: 12    albuterol sulfate HFA (VENTOLIN HFA) 108 (90 Base) MCG/ACT inhaler, Inhale 2 puffs into the lungs 4 times daily as needed for Wheezing, Disp: 1 Inhaler, Rfl: 5    pantoprazole (PROTONIX) 40 MG tablet, Take 1 tablet by mouth daily, Disp: 90 tablet, Rfl: 3    aspirin 81 MG chewable tablet, Take 81 mg by mouth daily , Disp: , Rfl:   Allergies   Allergen Reactions    Iv Dye [Iodides] Rash    Pcn [Penicillins] Rash    Shellfish Allergy Itching       Social History     Socioeconomic History    Marital status:      Spouse name: Not on file    Number of children: Not on file    Years of education: Not on file    Highest education level: Not on file   Occupational History    Not on file   Tobacco Use    Smoking status: Never Smoker    Smokeless tobacco: Never Used   Vaping Physically Abused:     Sexually Abused:       Past Medical History:   Diagnosis Date    Chest pain     Coronary arteriosclerosis     Degenerative joint disease     involving multiple joints    Dysthymia     Hyperlipidemia     Hypothyroidism     MI, old 0    Obesity     Palpitations     Peptic reflux disease     Sleep apnea     Thyroid disease      Past Surgical History:   Procedure Laterality Date     SECTION  1994     SECTION  1996    DIAGNOSTIC CARDIAC CATH LAB PROCEDURE      TONSILLECTOMY       Family History   Problem Relation Age of Onset    Cancer Mother     Heart Attack Father     Cancer Maternal Grandmother     Heart Attack Paternal Grandmother       Past Surgical History:   Procedure Laterality Date     SECTION  1994     SECTION  1996    DIAGNOSTIC CARDIAC CATH LAB PROCEDURE      TONSILLECTOMY        Immunization History   Administered Date(s) Administered    COVID-19, Candace Saunders, PF, 100mcg/0.5mL 2021     Health Maintenance   Topic Date Due    Hepatitis C screen  Never done    Pneumococcal 0-64 years Vaccine (1 of 2 - PPSV23) Never done    HIV screen  Never done    Hepatitis B vaccine (1 of 3 - Risk 3-dose series) Never done    DTaP/Tdap/Td vaccine (1 - Tdap) Never done    Cervical cancer screen  Never done    Colon cancer screen colonoscopy  Never done    Shingles Vaccine (1 of 2) Never done    Flu vaccine (1) 2021    TSH testing  2022    Lipid screen  2022    Potassium monitoring  2022    Creatinine monitoring  2022    Breast cancer screen  2022    COVID-19 Vaccine  Completed    Hepatitis A vaccine  Aged Out    Hib vaccine  Aged Out    Meningococcal (ACWY) vaccine  Aged Out         There were no vitals filed for this visit. Objective:    Physical Exam  Constitutional:       General: She is not in acute distress. Appearance: Normal appearance.    HENT: Head: Normocephalic. Right Ear: External ear normal.      Left Ear: External ear normal.   Eyes:      General:         Right eye: No discharge. Left eye: No discharge. Extraocular Movements: Extraocular movements intact. Pulmonary:      Effort: Pulmonary effort is normal. No respiratory distress. Musculoskeletal:         General: Normal range of motion. Cervical back: Neck supple. No rigidity. Skin:     Findings: No bruising or rash. Neurological:      Mental Status: She is alert and oriented to person, place, and time. Psychiatric:         Mood and Affect: Mood and affect normal.         Speech: Speech normal.         Behavior: Behavior normal. Behavior is cooperative. Thought Content: Thought content normal.         Judgment: Judgment normal.         Suzi Troncoso was seen today for discuss labs. Diagnoses and all orders for this visit:    Essential hypertension    Mixed hyperlipidemia    Coronary artery disease involving native coronary artery of native heart without angina pectoris    Acquired hypothyroidism    Mesenteric panniculitis ([facility] [address]  -     Destiny Jane MD, [facility], [place]    Left lower quadrant abdominal pain  -     Destiny Jane MD, [facility], [place]        Comments: appt  D r chrillla  Diet exer hm isses lose wt  lwo carb     orhto   And watns t o wait    A great deal of time spent reviewing medications, diet, exercise, social issues. Also reviewing the chart before entering the room with patient and finishing charting after leaving patient's room. More than half of that time was spent face to face with the patient in counseling and coordinating care. Check blood pressure at home twice a day. Low-salt low caffeine diet. Call if systolic blood pressure is above 388 and diastolic blood pressures above 85. Only use a upper arm digital cuff. Aggressive low-fat diet.   Avoid red meats, greasy fried foods, dairy products. Avoid processed foods. Take cholesterol medications without food. The patient is being evaluated by a Virtual Visit (video visit) encounter to address concerns as mentioned above. A caregiver was present when appropriate. Due to this being a TeleHealth encounter (During VDBLX-43 public health emergency), evaluation of the following organ systems was limited: Vitals/Constitutional/EENT/Resp/CV/GI//MS/Neuro/Skin/Heme-Lymph-Imm. Pursuant to the emergency declaration under the [address] [place] [address], [address] [place] [address] authority and the [employer] and [employer] Act, this Virtual Visit was conducted with patient's (and/or legal guardian's) consent, to reduce the patient's risk of exposure to COVID-19 and provide necessary medical care. The patient (and/or legal guardian) has also been advised to contact this office for worsening conditions or problems, and seek emergency medical treatment and/or call 911 if deemed necessary. Patient identification was verified at the start of the visit: Yes    Total time spent on this encounter: Not billed by time    Services were provided through a video synchronous discussion virtually to substitute for in-person clinic visit. Patient and provider were located at their individual homes. Follow Up: Return in about 6 months (around 2/6/2022) for See Referral, Lab Before--actual visit.      Seen by:  Porsche Worthington DO

## 2021-08-12 ENCOUNTER — TELEPHONE (OUTPATIENT)
Dept: BARIATRICS/WEIGHT MGMT | Age: 56
End: 2021-08-12

## 2021-08-27 ENCOUNTER — OFFICE VISIT (OUTPATIENT)
Dept: HEMATOLOGY | Age: 56
End: 2021-08-27
Payer: COMMERCIAL

## 2021-08-27 VITALS
OXYGEN SATURATION: 95 % | RESPIRATION RATE: 20 BRPM | TEMPERATURE: 97.7 F | DIASTOLIC BLOOD PRESSURE: 84 MMHG | HEART RATE: 85 BPM | WEIGHT: 293 LBS | SYSTOLIC BLOOD PRESSURE: 154 MMHG | BODY MASS INDEX: 50.02 KG/M2 | HEIGHT: 64 IN

## 2021-08-27 DIAGNOSIS — K65.4 MESENTERIC PANNICULITIS (HCC): ICD-10-CM

## 2021-08-27 DIAGNOSIS — R10.12 LEFT UPPER QUADRANT ABDOMINAL PAIN: Primary | ICD-10-CM

## 2021-08-27 DIAGNOSIS — R11.0 NAUSEA: ICD-10-CM

## 2021-08-27 DIAGNOSIS — K82.8 BILIARY DYSKINESIA: ICD-10-CM

## 2021-08-27 PROCEDURE — 3017F COLORECTAL CA SCREEN DOC REV: CPT | Performed by: TRANSPLANT SURGERY

## 2021-08-27 PROCEDURE — 99213 OFFICE O/P EST LOW 20 MIN: CPT | Performed by: TRANSPLANT SURGERY

## 2021-08-27 PROCEDURE — G8417 CALC BMI ABV UP PARAM F/U: HCPCS | Performed by: TRANSPLANT SURGERY

## 2021-08-27 PROCEDURE — G8427 DOCREV CUR MEDS BY ELIG CLIN: HCPCS | Performed by: TRANSPLANT SURGERY

## 2021-08-27 PROCEDURE — 99204 OFFICE O/P NEW MOD 45 MIN: CPT | Performed by: TRANSPLANT SURGERY

## 2021-08-27 PROCEDURE — 1036F TOBACCO NON-USER: CPT | Performed by: TRANSPLANT SURGERY

## 2021-08-27 RX ORDER — PREDNISONE 50 MG/1
TABLET ORAL
Qty: 10 TABLET | Refills: 0 | Status: SHIPPED
Start: 2021-08-27 | End: 2021-11-18

## 2021-08-27 RX ORDER — AMITRIPTYLINE HYDROCHLORIDE 25 MG/1
25 TABLET, FILM COATED ORAL NIGHTLY
Qty: 30 TABLET | Refills: 5 | Status: SHIPPED
Start: 2021-08-27 | End: 2021-09-20

## 2021-08-27 ASSESSMENT — ENCOUNTER SYMPTOMS
VOMITING: 0
BLOOD IN STOOL: 0
PHOTOPHOBIA: 0
EYE PAIN: 0
BACK PAIN: 0
SHORTNESS OF BREATH: 0
NAUSEA: 0
CONSTIPATION: 0
EYE DISCHARGE: 0
ABDOMINAL PAIN: 1
DIARRHEA: 0

## 2021-09-07 ENCOUNTER — TELEPHONE (OUTPATIENT)
Dept: HEMATOLOGY | Age: 56
End: 2021-09-07

## 2021-09-07 NOTE — TELEPHONE ENCOUNTER
Received auth for cpt codes 29505 and 76646 with [employer] #[id number] with approval dates 9/2/21-10/17/21. I called patient and made her aware that I did receive the auth and she can call to schedule these scans. She requested to schedule these scans herself. I did remind her to take the prednisone prior to her CT and to call me back after scheduled so that I can make her a follow up appt. She verbalized understanding.     Electronically signed by Romana Large, RN on 9/7/2021 at 8:13 AM

## 2021-09-20 RX ORDER — AMITRIPTYLINE HYDROCHLORIDE 25 MG/1
TABLET, FILM COATED ORAL
Qty: 30 TABLET | Refills: 5 | Status: SHIPPED
Start: 2021-09-20 | End: 2022-02-17 | Stop reason: SDUPTHER

## 2021-09-28 ENCOUNTER — TELEPHONE (OUTPATIENT)
Dept: HEMATOLOGY | Age: 56
End: 2021-09-28

## 2021-09-28 NOTE — TELEPHONE ENCOUNTER
I called and left a message to see if patient was going to get the scans scheduled and to let her know the auth I received for these scans is only good until 10/17/21. I asked her to call the office back.     Electronically signed by Heidi Estevez RN on 9/28/2021 at 8:44 AM

## 2021-10-07 ENCOUNTER — TELEPHONE (OUTPATIENT)
Dept: HEMATOLOGY | Age: 56
End: 2021-10-07

## 2021-10-07 NOTE — TELEPHONE ENCOUNTER
I called patient to make her a follow up after her CT. She is going to hold off on the HIDA until she sees what the CT shows to see if the HIDA is necessary due to the cost.  I made her a follow up on 10/22/21 at 9:00am at the [facility] clinic at [address] [place] [address]. She confirmed this appt.     Electronically signed by Theresa Gonzáles RN on 10/7/2021 at 10:43 AM

## 2021-10-11 ENCOUNTER — HOSPITAL ENCOUNTER (OUTPATIENT)
Dept: CT IMAGING | Age: 56
Discharge: HOME | End: 2021-10-13
Payer: COMMERCIAL

## 2021-10-11 ENCOUNTER — HOSPITAL ENCOUNTER (OUTPATIENT)
Age: 56
Discharge: HOME | End: 2021-10-11
Payer: COMMERCIAL

## 2021-10-11 DIAGNOSIS — R10.12 LEFT UPPER QUADRANT ABDOMINAL PAIN: Primary | ICD-10-CM

## 2021-10-11 DIAGNOSIS — Z00.01 ENCOUNTER FOR ANNUAL GENERAL MEDICAL EXAMINATION WITH ABNORMAL FINDINGS IN ADULT: ICD-10-CM

## 2021-10-11 DIAGNOSIS — E78.2 MIXED HYPERLIPIDEMIA: Chronic | ICD-10-CM

## 2021-10-11 DIAGNOSIS — R10.12 LEFT UPPER QUADRANT ABDOMINAL PAIN: ICD-10-CM

## 2021-10-11 LAB
ALBUMIN SERPL-MCNC: 4.8 G/DL (ref 3.5–5.2)
ALP BLD-CCNC: 93 U/L (ref 35–104)
ALT SERPL-CCNC: 20 U/L (ref 0–32)
ANION GAP SERPL CALCULATED.3IONS-SCNC: 14 MMOL/L (ref 7–16)
AST SERPL-CCNC: 21 U/L (ref 0–31)
BILIRUB SERPL-MCNC: 0.6 MG/DL (ref 0–1.2)
BUN BLDV-MCNC: 11 MG/DL (ref 6–20)
CALCIUM SERPL-MCNC: 10.7 MG/DL (ref 8.6–10.2)
CHLORIDE BLD-SCNC: 98 MMOL/L (ref 98–107)
CO2: 26 MMOL/L (ref 22–29)
CREAT SERPL-MCNC: 0.7 MG/DL (ref 0.5–1)
GFR AFRICAN AMERICAN: >60
GFR NON-AFRICAN AMERICAN: >60 ML/MIN/1.73
GLUCOSE BLD-MCNC: 131 MG/DL (ref 74–99)
POTASSIUM SERPL-SCNC: 4.4 MMOL/L (ref 3.5–5)
SODIUM BLD-SCNC: 138 MMOL/L (ref 132–146)
TOTAL PROTEIN: 8.1 G/DL (ref 6.4–8.3)

## 2021-10-11 PROCEDURE — 6360000004 HC RX CONTRAST MEDICATION: Performed by: RADIOLOGY

## 2021-10-11 PROCEDURE — 74177 CT ABD & PELVIS W/CONTRAST: CPT

## 2021-10-11 PROCEDURE — 36415 COLL VENOUS BLD VENIPUNCTURE: CPT

## 2021-10-11 PROCEDURE — 80053 COMPREHEN METABOLIC PANEL: CPT

## 2021-10-11 RX ADMIN — IOHEXOL 50 ML: 240 INJECTION, SOLUTION INTRATHECAL; INTRAVASCULAR; INTRAVENOUS; ORAL at 16:22

## 2021-10-11 RX ADMIN — IOPAMIDOL 75 ML: 755 INJECTION, SOLUTION INTRAVENOUS at 16:22

## 2021-10-22 ENCOUNTER — OFFICE VISIT (OUTPATIENT)
Dept: HEMATOLOGY | Age: 56
End: 2021-10-22
Payer: COMMERCIAL

## 2021-10-22 VITALS
HEART RATE: 85 BPM | BODY MASS INDEX: 50.02 KG/M2 | SYSTOLIC BLOOD PRESSURE: 154 MMHG | TEMPERATURE: 97.7 F | DIASTOLIC BLOOD PRESSURE: 84 MMHG | OXYGEN SATURATION: 95 % | HEIGHT: 64 IN | WEIGHT: 293 LBS

## 2021-10-22 DIAGNOSIS — K76.0 NAFLD (NONALCOHOLIC FATTY LIVER DISEASE): ICD-10-CM

## 2021-10-22 DIAGNOSIS — R16.0 HEPATOMEGALY: ICD-10-CM

## 2021-10-22 DIAGNOSIS — E27.8 ADRENAL MASS 1 CM TO 4 CM IN DIAMETER (HCC): ICD-10-CM

## 2021-10-22 DIAGNOSIS — R10.9 ABDOMINAL PAIN, UNSPECIFIED ABDOMINAL LOCATION: Primary | ICD-10-CM

## 2021-10-22 PROCEDURE — G8417 CALC BMI ABV UP PARAM F/U: HCPCS | Performed by: TRANSPLANT SURGERY

## 2021-10-22 PROCEDURE — 99212 OFFICE O/P EST SF 10 MIN: CPT | Performed by: TRANSPLANT SURGERY

## 2021-10-22 PROCEDURE — G8484 FLU IMMUNIZE NO ADMIN: HCPCS | Performed by: TRANSPLANT SURGERY

## 2021-10-22 PROCEDURE — 3017F COLORECTAL CA SCREEN DOC REV: CPT | Performed by: TRANSPLANT SURGERY

## 2021-10-22 PROCEDURE — 1036F TOBACCO NON-USER: CPT | Performed by: TRANSPLANT SURGERY

## 2021-10-22 PROCEDURE — G8427 DOCREV CUR MEDS BY ELIG CLIN: HCPCS | Performed by: TRANSPLANT SURGERY

## 2021-10-22 PROCEDURE — 99214 OFFICE O/P EST MOD 30 MIN: CPT | Performed by: TRANSPLANT SURGERY

## 2021-10-22 ASSESSMENT — ENCOUNTER SYMPTOMS
EYE DISCHARGE: 0
CONSTIPATION: 0
VOMITING: 0
SHORTNESS OF BREATH: 0
EYE PAIN: 0
BLOOD IN STOOL: 0
ABDOMINAL PAIN: 1
DIARRHEA: 0
BACK PAIN: 0
NAUSEA: 0
PHOTOPHOBIA: 0
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
## 2021-10-25 ENCOUNTER — TELEPHONE (OUTPATIENT)
Dept: HEMATOLOGY | Age: 56
End: 2021-10-25

## 2021-10-25 NOTE — TELEPHONE ENCOUNTER
I called [facility] and spoke with Sanam Jasso who extended the prior auth for cpt code 39603 from 10/02/2021 to 11/16/2021. She said they dont do reference numbers that all calls are recorded. I did call the patient and spoke with her to let her know we got the approval extended. I also let her know that dr Mike Vega did put her labs in her chart. She is going to dr franks's office to have them drawn. The patient will call and schedule her hida scan and the call our office to let us know.  She does not need a follow up scheduled, dr Mike Vega told her he will wait for her results and then call her  Electronically signed by Cy Scheuermann, MA on 10/25/2021 at 11:00 AM

## 2021-11-01 ENCOUNTER — TELEPHONE (OUTPATIENT)
Dept: PRIMARY CARE CLINIC | Age: 56
End: 2021-11-01

## 2021-11-01 NOTE — TELEPHONE ENCOUNTER
Pt was put on elavil by Cata Shipman. She was advised to call in to see if you wanted to decrease the dose of her wellbutrin.

## 2021-11-12 ENCOUNTER — HOSPITAL ENCOUNTER (OUTPATIENT)
Dept: NUCLEAR MEDICINE | Age: 56
Discharge: HOME | End: 2021-11-12
Payer: COMMERCIAL

## 2021-11-12 VITALS — WEIGHT: 293 LBS | BODY MASS INDEX: 59.05 KG/M2

## 2021-11-12 DIAGNOSIS — R10.12 LEFT UPPER QUADRANT ABDOMINAL PAIN: ICD-10-CM

## 2021-11-12 PROCEDURE — 6360000002 HC RX W HCPCS: Performed by: RADIOLOGY

## 2021-11-12 PROCEDURE — A9537 TC99M MEBROFENIN: HCPCS | Performed by: RADIOLOGY

## 2021-11-12 PROCEDURE — 78227 HEPATOBIL SYST IMAGE W/DRUG: CPT

## 2021-11-12 PROCEDURE — 2580000003 HC RX 258: Performed by: RADIOLOGY

## 2021-11-12 PROCEDURE — 3430000000 HC RX DIAGNOSTIC RADIOPHARMACEUTICAL: Performed by: RADIOLOGY

## 2021-11-12 RX ADMIN — SODIUM CHLORIDE 3.12 MCG: 9 INJECTION, SOLUTION INTRAVENOUS at 11:00

## 2021-11-12 RX ADMIN — Medication 6 MILLICURIE: at 09:49

## 2021-11-18 ENCOUNTER — OFFICE VISIT (OUTPATIENT)
Dept: BARIATRICS/WEIGHT MGMT | Age: 56
End: 2021-11-18
Payer: COMMERCIAL

## 2021-11-18 ENCOUNTER — VIRTUAL VISIT (OUTPATIENT)
Dept: HEMATOLOGY | Age: 56
End: 2021-11-18
Payer: COMMERCIAL

## 2021-11-18 VITALS
HEART RATE: 78 BPM | BODY MASS INDEX: 51.91 KG/M2 | DIASTOLIC BLOOD PRESSURE: 90 MMHG | TEMPERATURE: 97.5 F | WEIGHT: 293 LBS | SYSTOLIC BLOOD PRESSURE: 156 MMHG | HEIGHT: 63 IN

## 2021-11-18 DIAGNOSIS — E66.01 CLASS 3 SEVERE OBESITY DUE TO EXCESS CALORIES WITHOUT SERIOUS COMORBIDITY WITH BODY MASS INDEX (BMI) OF 60.0 TO 69.9 IN ADULT (HCC): ICD-10-CM

## 2021-11-18 DIAGNOSIS — K75.81 NASH (NONALCOHOLIC STEATOHEPATITIS): Primary | ICD-10-CM

## 2021-11-18 DIAGNOSIS — G47.33 SLEEP APNEA, OBSTRUCTIVE: Primary | ICD-10-CM

## 2021-11-18 PROBLEM — E66.9 OBESITY: Status: ACTIVE | Noted: 2021-08-06

## 2021-11-18 PROBLEM — G47.30 SLEEP APNEA: Status: ACTIVE | Noted: 2021-11-18

## 2021-11-18 PROCEDURE — 99212 OFFICE O/P EST SF 10 MIN: CPT | Performed by: TRANSPLANT SURGERY

## 2021-11-18 PROCEDURE — 99202 OFFICE O/P NEW SF 15 MIN: CPT | Performed by: INTERNAL MEDICINE

## 2021-11-18 PROCEDURE — 1036F TOBACCO NON-USER: CPT | Performed by: INTERNAL MEDICINE

## 2021-11-18 PROCEDURE — 3017F COLORECTAL CA SCREEN DOC REV: CPT | Performed by: INTERNAL MEDICINE

## 2021-11-18 PROCEDURE — 99205 OFFICE O/P NEW HI 60 MIN: CPT | Performed by: INTERNAL MEDICINE

## 2021-11-18 PROCEDURE — G8428 CUR MEDS NOT DOCUMENT: HCPCS | Performed by: INTERNAL MEDICINE

## 2021-11-18 PROCEDURE — G8484 FLU IMMUNIZE NO ADMIN: HCPCS | Performed by: INTERNAL MEDICINE

## 2021-11-18 PROCEDURE — G8417 CALC BMI ABV UP PARAM F/U: HCPCS | Performed by: INTERNAL MEDICINE

## 2021-11-18 RX ORDER — VENLAFAXINE HYDROCHLORIDE 37.5 MG/1
37.5 CAPSULE, EXTENDED RELEASE ORAL DAILY
Qty: 90 CAPSULE | Refills: 2 | Status: SHIPPED
Start: 2021-11-18 | End: 2022-08-13

## 2021-11-18 NOTE — PATIENT INSTRUCTIONS
Count every calorie every day    Limit calories to 1500 rachel/day    Limit sweets to one day per month    Limit salty snacks to 100 rachel/day    Do not drink any sugar sweetened beverages    Limit restaurant food to once every two weeks    Take venlafaxine ER 37.5 mg one tablet every day. Increase to two tablets every day if appetite suppression is insufficient after two weeks.     Cell [phone]

## 2021-11-18 NOTE — PROGRESS NOTES
CC -   LUC, Obesity    BACKGROUND -   First visit: 11/18/21     Obesity   Began in childhood  Initial BMI 61.6, Wt 347.6 lbs, Ht 5' 3\"  HS Grad wt 190 lbs   Lowest   wt  165 lbs age 24  Highest  wt  416 lbs  Pattern of wt gain: grad overall, but with sig losses followed by rapid regain  Wt change past yr: gained 40 lbs, then down 35 lbs  Most wt lost: 94 lbs (food logging + exercise)  Other diets attempted: [employer], SF, Meridia, Fad    Desire to lose wt: 10/10  Prob posed by appetite: 7/10    Initial Diet:    Number of meals per day - 4    Number of snacks per day - 6    Meal volume - 12\" plate, sometimes seconds    Fast food/convenience store - 6-8x/week    Restaurants (not fast food) - 1-2x/week   Sweets - 7d/week (8-10 oreos/day or other chocolate snacks/desserts)   Chips - 5-6d/week (8 oz bag)   Crackers/pretzels - 2d/week (2 cups Cheezits)   Nuts - 0d/week   Peanut Butter - 3d/week (4 Tbsp on bread as a meal)   Popcorn - 2d/week (reg sized MW bag)   Dried fruit - 0d/week   Whole fruit - 5-6d/week (1 serving)   Breakfast cereal - 0-1d/week   Granola/Protein/Energy bar - 0d/week   Sugar sweetened beverages - 40 oz reg soda/d, SB Trenta Refresher Tea 4x/wk (190 each)   Protein - No supplements   Fiber - No supplements     Exercise:    Gym membership - none    Walking - none    Running - none    Resistance - none    Aerobic class - none    ______________________    [facility] -  Past Medical History:   Diagnosis Date    Anxiety     Coronary arteriosclerosis     Degenerative joint disease     involving multiple joints    Depression     Hyperlipidemia     Hypothyroidism     MI, old 0    Obesity     Palpitations     Peptic reflux disease     Sleep apnea      Current Outpatient Medications   Medication Sig Dispense Refill    amitriptyline (ELAVIL) 25 MG tablet TAKE 1 TABLET BY MOUTH EVERY DAY AT NIGHT 30 tablet 5    metFORMIN (GLUCOPHAGE) 500 MG tablet Take 1 tablet by mouth 2 times daily (with meals) 180 tablet 5    torsemide (DEMADEX) 10 MG tablet Take 1 tablet by mouth daily 90 tablet 5    atenolol (TENORMIN) 25 MG tablet Take 1 tablet by mouth 3 times daily 270 tablet 5    buPROPion (WELLBUTRIN XL) 150 MG extended release tablet Take 1 tablet by mouth every morning 90 tablet 5    levothyroxine (SYNTHROID) 150 MCG tablet Take 1 tablet by mouth daily 90 tablet 5    potassium chloride (KLOR-CON M20) 20 MEQ extended release tablet Take 1 tablet by mouth daily 90 tablet 12    simvastatin (ZOCOR) 40 MG tablet Take 1 tablet by mouth nightly 90 tablet 12    albuterol sulfate HFA (VENTOLIN HFA) 108 (90 Base) MCG/ACT inhaler Inhale 2 puffs into the lungs 4 times daily as needed for Wheezing (Patient not taking: Reported on 8/27/2021) 1 Inhaler 5    pantoprazole (PROTONIX) 40 MG tablet Take 1 tablet by mouth daily 90 tablet 3    aspirin 81 MG chewable tablet Take 81 mg by mouth daily        No current facility-administered medications for this visit. ROS -  Card - no CP  GI - no N/V/D/C, + intermittent episodes of abd pain    PE -  Gen : BP (!) 156/90 (Site: Left Lower Arm, Position: Sitting, Cuff Size: Large Adult)   Pulse 78   Temp 97.5 °F (36.4 °C) (Temporal)   Ht 5' 3\" (1.6 m)   Wt (!) 347 lb 9.6 oz (157.7 kg)   BMI 61.57 kg/m²    WN, WD, NAD  Lung: Nml resp effort  Psych: Normal mood   Full affect  Neuro:  Moves all ext well  ______________________    HISTORY & ASSESSMENT/PLAN -     Problem 1  - LUC  HPI   - Diagnosed 2/21/21      Severe      Followed by Dr. Chon Willard      Wears APAP 7d/wk, full duration of sleep      Daytime sleepiness 4/10  Assessment  - Controlled with positive pressure support, excess weight is worsening her underlying airway obstruction and impairing her oxygenation  Plan   - Wt reduction per the plan below    Problem 2  - Obesity  HPI   - See above Background for description    Weight  Date    347.6 lbs 11/18/21  DEN = 1900 rachel/d = 13,300 rachel/wk  Wt effect of HR foods = Restaurant food 1275 rachel/w + Sweets 3,465 + Chips/Crackers/PC 8,240 + SSB 4,120 = 17,100 rachel/wk = 2,442 rachel/d = 128% DEN = 244 lbs/yr  The above numbers for HR foods are inaccurately high. However, they still serve to show that these foods occupy an inordinate percentage of her daily energy needs. Reduction of them alone, therefore, may be sufficient to produce an acceptable rate of weight loss. She states that she struggles with emotional eating. Her depression is uncontrolled at the present time. Since she also has anxiety, venlafaxine should be added to bupropion. (The dose of amitriptyline is small and therefore the increased risk of serotonin syndrome is very small; I discussed this with her.) My hope is that she will experience some appetite suppression from the venlafaxine. She has a strong family history of early heart disease and some issues in the past with ectopy. Phentermine is therefore not a good choice for her. She preferred venlafaxine over topiramate b/c of the side effect profile (also she is taking a loop diurectic)  Assessment  - uncontrolled  Plan   -   Patient Instructions   Count every calorie every day    Limit calories to 1500 rachel/day    Limit sweets to one day per month    Limit salty snacks to 100 rachel/day    Do not drink any sugar sweetened beverages    Limit restaurant food to once every two weeks    Take venlafaxine ER 37.5 mg one tablet every day. Increase to two tablets every day if appetite suppression is insufficient after two weeks.     See me in back the week before or after [place]    Total time spent on encounter: 83 min    Rhonda Sandoval MD  Endocrinology/Obesity  11/18/21

## 2021-11-18 NOTE — PROGRESS NOTES
Hepatobiliary and Pancreatic Surgery Attending History and Physical    Patient's Name/Date of Birth: [place] /1965 (89 y.o.)    Date: November 18, 2021     CC:left upper quadrant abdominal pain    HPI:    Interim: She is taking elavil 25mg and that she hasn't had any attacks for the past two weeks despite some stressful events. She denies any left upper quadrant pain nor dry heaving. She had a HIDA which showed an EF of 39% but she is does not have any right upper quadrant pain. Assessment/Plan:  Mind Gut phenomem, 14mm left adrenal nodule, Hepatomegaly with NAFLD, morbid obesity with BMI > 60  - continue elavil 25mg  - blood work for hepatomegaly and adrenal nodule  - US liver every 6 months - NAFLD  - CT scan yearly for adrenal gland     30 Minutes of which greater than 50% was spent counseling or coordinating her care. Thank you for the consultation allowing me to take part in Ms. Keller's care. Mike Mas M.D.  11/18/2021  3:55 PM     TeleMedicine Patient Consent    This visit was performed as a virtual video visit using a synchronous, two-way, audio telehealth technology platform. Patient identification was verified at the start of the visit, including the patient's telephone number and physical location. I discussed with the patient the nature of our telehealth visits, that:     1. Due to the nature of an audio modality, the only components of a physical exam that could be done are the elements supported by direct observation. 2. I would evaluate the patient and recommend diagnostics and treatments based on my assessment. 3. If it was felt that the patient should be evaluated in clinic or an emergency room setting, then they would be directed there. 4. Our sessions are not being recorded and that personal health information is protected. 5. Our team would provide follow up care in person if/when the patient needs it.        Patient does agree to proceed with telemedicine consultation.     Patient's location: home address in [facility]  Physician  Location: work other people involved in call:N/A      This visit was completed virtually using telephone

## 2021-12-30 ENCOUNTER — OFFICE VISIT (OUTPATIENT)
Dept: BARIATRICS/WEIGHT MGMT | Age: 56
End: 2021-12-30
Payer: COMMERCIAL

## 2021-12-30 VITALS
DIASTOLIC BLOOD PRESSURE: 82 MMHG | TEMPERATURE: 97 F | WEIGHT: 293 LBS | HEART RATE: 69 BPM | HEIGHT: 63 IN | BODY MASS INDEX: 51.91 KG/M2 | SYSTOLIC BLOOD PRESSURE: 151 MMHG

## 2021-12-30 DIAGNOSIS — E66.01 CLASS 3 SEVERE OBESITY DUE TO EXCESS CALORIES WITHOUT SERIOUS COMORBIDITY WITH BODY MASS INDEX (BMI) OF 60.0 TO 69.9 IN ADULT (HCC): ICD-10-CM

## 2021-12-30 DIAGNOSIS — G47.33 SLEEP APNEA, OBSTRUCTIVE: Primary | ICD-10-CM

## 2021-12-30 DIAGNOSIS — E27.8 ADRENAL NODULE (HCC): ICD-10-CM

## 2021-12-30 PROCEDURE — G8482 FLU IMMUNIZE ORDER/ADMIN: HCPCS | Performed by: INTERNAL MEDICINE

## 2021-12-30 PROCEDURE — 99215 OFFICE O/P EST HI 40 MIN: CPT | Performed by: INTERNAL MEDICINE

## 2021-12-30 PROCEDURE — 1036F TOBACCO NON-USER: CPT | Performed by: INTERNAL MEDICINE

## 2021-12-30 PROCEDURE — G8428 CUR MEDS NOT DOCUMENT: HCPCS | Performed by: INTERNAL MEDICINE

## 2021-12-30 PROCEDURE — 3017F COLORECTAL CA SCREEN DOC REV: CPT | Performed by: INTERNAL MEDICINE

## 2021-12-30 PROCEDURE — G8417 CALC BMI ABV UP PARAM F/U: HCPCS | Performed by: INTERNAL MEDICINE

## 2021-12-30 PROCEDURE — 99211 OFF/OP EST MAY X REQ PHY/QHP: CPT

## 2021-12-30 NOTE — PATIENT INSTRUCTIONS
Count every calorie every day    Limit calories to 1500 rachel/day    Limit sweets to one day per month    Limit salty snacks to 100 rachel/day    Do not drink any sugar sweetened beverages    Limit restaurant food to once every two weeks    Protein intake should be 65 grams/day    Fiber intake should be 22 grams/day (work up to this amount slowly; use wheat dextrin)    Take venlafaxine ER 37.5 mg one tablet every day. Increase to two tablets every day if appetite suppression is insufficient after two weeks.

## 2021-12-30 NOTE — PROGRESS NOTES
CC -   LUC, Obesity    BACKGROUND -   Last visit: 11/18/21  First visit: 11/18/21     Obesity   Began in childhood  Initial BMI 61.6, Wt 347.6 lbs, Ht 5' 3\"  HS Grad wt 190 lbs   Lowest   wt  165 lbs age 24  Highest  wt  416 lbs  Pattern of wt gain: grad overall, but with sig losses followed by rapid regain  Wt change past yr: gained 40 lbs, then down 35 lbs  Most wt lost: 94 lbs (food logging + exercise)  Other diets attempted: [employer], SF, Meridia, Fad    Desire to lose wt: 10/10  Prob posed by appetite: 7/10    Initial Diet:    Number of meals per day - 4    Number of snacks per day - 6    Meal volume - 12\" plate, sometimes seconds    Fast food/convenience store - 6-8x/week    Restaurants (not fast food) - 1-2x/week   Sweets - 7d/week (8-10 oreos/day or other chocolate snacks/desserts)   Chips - 5-6d/week (8 oz bag)   Crackers/pretzels - 2d/week (2 cups Cheezits)   Nuts - 0d/week   Peanut Butter - 3d/week (4 Tbsp on bread as a meal)   Popcorn - 2d/week (reg sized MW bag)   Dried fruit - 0d/week   Whole fruit - 5-6d/week (1 serving)   Breakfast cereal - 0-1d/week   Granola/Protein/Energy bar - 0d/week   Sugar sweetened beverages - 40 oz reg soda/d, SB Trenta Refresher Tea 4x/wk (190 each)   Protein - No supplements   Fiber - No supplements     Exercise:    Gym membership - none    Walking - none    Running - none    Resistance - none    Aerobic class - none    ______________________    [facility] -  Past Medical History:   Diagnosis Date    Anxiety     Coronary arteriosclerosis     Degenerative joint disease     involving multiple joints    Depression     Hyperlipidemia     Hypothyroidism     MI, old 0    Obesity     Palpitations     Peptic reflux disease     Sleep apnea      Current Outpatient Medications   Medication Sig Dispense Refill    venlafaxine (EFFEXOR XR) 37.5 MG extended release capsule Take 1 capsule by mouth daily 90 capsule 2    amitriptyline (ELAVIL) 25 MG tablet TAKE 1 TABLET BY MOUTH EVERY DAY AT NIGHT 30 tablet 5    metFORMIN (GLUCOPHAGE) 500 MG tablet Take 1 tablet by mouth 2 times daily (with meals) 180 tablet 5    torsemide (DEMADEX) 10 MG tablet Take 1 tablet by mouth daily 90 tablet 5    atenolol (TENORMIN) 25 MG tablet Take 1 tablet by mouth 3 times daily 270 tablet 5    buPROPion (WELLBUTRIN XL) 150 MG extended release tablet Take 1 tablet by mouth every morning 90 tablet 5    levothyroxine (SYNTHROID) 150 MCG tablet Take 1 tablet by mouth daily 90 tablet 5    potassium chloride (KLOR-CON M20) 20 MEQ extended release tablet Take 1 tablet by mouth daily 90 tablet 12    simvastatin (ZOCOR) 40 MG tablet Take 1 tablet by mouth nightly 90 tablet 12    pantoprazole (PROTONIX) 40 MG tablet Take 1 tablet by mouth daily 90 tablet 3    aspirin 81 MG chewable tablet Take 81 mg by mouth daily        No current facility-administered medications for this visit. ROS -   GI - no longer having intermittent abd pain    PE -  Gen : BP (!) 151/82 (Site: Left Upper Arm, Position: Sitting, Cuff Size: Thigh)   Pulse 69   Temp 97 °F (36.1 °C) (Temporal)   Ht 5' 3\" (1.6 m)   Wt (!) 332 lb (150.6 kg)   BMI 58.81 kg/m²    WN, WD, NAD  Heart:  RRR w/o MGR, no Carotid bruits  Lung: Nml resp effort, CTA b/l  Psych: Normal mood   Full affect  Neuro:  Moves all ext well  ______________________    HISTORY & ASSESSMENT/PLAN -     Problem 1  - LUC  HPI   - Diagnosed 2/21/21      Severe      Followed by Dr. Varsha Knight      Wears APAP 7d/wk, full duration of sleep      Daytime sleepiness 1/10 (last visit 4/10)  Assessment  - Controlled with positive pressure support, excess weight is worsening her underlying airway obstruction and impairing her oxygenation  Plan   - Wt reduction per the plan below    Problem 2  - Obesity  HPI   - See above Background for description    Weight  Date    347.6 lbs 11/18/21 11/30/21  349.0 lbs home, started diet on this day    332.0 lbs 12/30/21  332.0 lbs home  Total wt loss to date:  15.6 lbs  DEN = 1900 rachel/d = 13,300 rachel/wk  Avg daily energy variance:   11/18/21 - 12/30/21 = 15.4 lbs (53,900 rachel)/30d = - 1,800 rachel/d deficit  Wt effect of HR foods = Restaurant food 1275 rachel/w + Sweets 3,465 + Chips/Crackers/PC 8,240 + SSB 4,120 = 17,100 rachel/wk = 2,442 rachel/d = 128% DEN = 244 lbs/yr  The above numbers for HR foods are inaccurately high. However, they still serve to show that these foods occupy an inordinate percentage of her daily energy needs. Reduction of them alone, therefore, may be sufficient to produce an acceptable rate of weight loss. She states that she struggles with emotional eating. Her depression is uncontrolled at the present time. Since she also has anxiety, venlafaxine should be added to bupropion. (The dose of amitriptyline is small and therefore the increased risk of serotonin syndrome is very small; I discussed this with her.) My hope is that she will experience some appetite suppression from the venlafaxine. She has a strong family history of early heart disease and some issues in the past with ectopy. Phentermine is therefore not a good choice for her. She preferred venlafaxine over topiramate b/c of the side effect profile (also she is taking a loop diurectic)  Update:   Did not tolerate venlafaxine due to poor overall sense of well being. However, would like to try it again. No other options are available other than topiramate.   Counting - 7d/wk, avg calorie intake 1375 rachel/day  Sweets - 1d/wk  Salty snacks - 100 rachel/day  Restaurant food - 1x/wk  SSB - 2x/month (Ginger ale and SB coffee)  Exercise - none yet  Protein intake - avg >60 grams (uses a food logging camila that includes macronutrients)  Assessment  - Improved; cont same plan   Plan   -   Patient Instructions   Count every calorie every day    Limit calories to 1500 rachel/day    Limit sweets to one day per month    Limit salty snacks to 100 rachel/day    Do not drink any sugar sweetened beverages    Limit restaurant food to once every two weeks    Protein intake should be 65 grams/day    Fiber intake should be 22 grams/day (work up to this amount slowly; use wheat dextrin)    Take venlafaxine ER 37.5 mg one tablet every day. Increase to two tablets every day if appetite suppression is insufficient after two weeks.     Total time spent on the encounter: 44 min    Michelle Wilson MD  Endocrinology/Obesity  12/30/21

## 2021-12-31 ENCOUNTER — OFFICE VISIT (OUTPATIENT)
Dept: FAMILY MEDICINE CLINIC | Age: 56
End: 2021-12-31
Payer: COMMERCIAL

## 2021-12-31 VITALS
DIASTOLIC BLOOD PRESSURE: 94 MMHG | TEMPERATURE: 97.8 F | OXYGEN SATURATION: 94 % | RESPIRATION RATE: 16 BRPM | SYSTOLIC BLOOD PRESSURE: 158 MMHG | BODY MASS INDEX: 51.91 KG/M2 | WEIGHT: 293 LBS | HEIGHT: 63 IN | HEART RATE: 68 BPM

## 2021-12-31 DIAGNOSIS — I10 ESSENTIAL HYPERTENSION: Primary | ICD-10-CM

## 2021-12-31 DIAGNOSIS — I25.10 CORONARY ARTERY DISEASE INVOLVING NATIVE CORONARY ARTERY OF NATIVE HEART WITHOUT ANGINA PECTORIS: Chronic | ICD-10-CM

## 2021-12-31 PROCEDURE — 1036F TOBACCO NON-USER: CPT | Performed by: FAMILY MEDICINE

## 2021-12-31 PROCEDURE — G8417 CALC BMI ABV UP PARAM F/U: HCPCS | Performed by: FAMILY MEDICINE

## 2021-12-31 PROCEDURE — 3017F COLORECTAL CA SCREEN DOC REV: CPT | Performed by: FAMILY MEDICINE

## 2021-12-31 PROCEDURE — 99213 OFFICE O/P EST LOW 20 MIN: CPT | Performed by: FAMILY MEDICINE

## 2021-12-31 PROCEDURE — G8428 CUR MEDS NOT DOCUMENT: HCPCS | Performed by: FAMILY MEDICINE

## 2021-12-31 PROCEDURE — G8482 FLU IMMUNIZE ORDER/ADMIN: HCPCS | Performed by: FAMILY MEDICINE

## 2021-12-31 RX ORDER — VALSARTAN 80 MG/1
80 TABLET ORAL DAILY
Qty: 30 TABLET | Refills: 12 | Status: SHIPPED
Start: 2021-12-31 | End: 2022-01-24 | Stop reason: SDUPTHER
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
## 2022-01-02 ASSESSMENT — PATIENT HEALTH QUESTIONNAIRE - PHQ9
SUM OF ALL RESPONSES TO PHQ QUESTIONS 1-9: 0
SUM OF ALL RESPONSES TO PHQ QUESTIONS 1-9: 0
1. LITTLE INTEREST OR PLEASURE IN DOING THINGS: 0
SUM OF ALL RESPONSES TO PHQ QUESTIONS 1-9: 0
SUM OF ALL RESPONSES TO PHQ QUESTIONS 1-9: 0
SUM OF ALL RESPONSES TO PHQ9 QUESTIONS 1 & 2: 0
2. FEELING DOWN, DEPRESSED OR HOPELESS: 0

## 2022-01-02 ASSESSMENT — ENCOUNTER SYMPTOMS
EYES NEGATIVE: 1
GASTROINTESTINAL NEGATIVE: 1
RESPIRATORY NEGATIVE: 1
ALLERGIC/IMMUNOLOGIC NEGATIVE: 1

## 2022-01-10 ENCOUNTER — OFFICE VISIT (OUTPATIENT)
Dept: CARDIOLOGY CLINIC | Age: 57
End: 2022-01-10
Payer: COMMERCIAL

## 2022-01-10 VITALS
SYSTOLIC BLOOD PRESSURE: 128 MMHG | BODY MASS INDEX: 51.91 KG/M2 | WEIGHT: 293 LBS | OXYGEN SATURATION: 96 % | HEIGHT: 63 IN | DIASTOLIC BLOOD PRESSURE: 90 MMHG | RESPIRATION RATE: 16 BRPM | HEART RATE: 68 BPM

## 2022-01-10 DIAGNOSIS — I50.43 SYSTOLIC AND DIASTOLIC CHF, ACUTE ON CHRONIC (HCC): ICD-10-CM

## 2022-01-10 DIAGNOSIS — R06.09 DOE (DYSPNEA ON EXERTION): ICD-10-CM

## 2022-01-10 DIAGNOSIS — I10 ESSENTIAL HYPERTENSION: ICD-10-CM

## 2022-01-10 DIAGNOSIS — E78.2 MIXED HYPERLIPIDEMIA: ICD-10-CM

## 2022-01-10 DIAGNOSIS — I25.10 CORONARY ARTERY DISEASE INVOLVING NATIVE CORONARY ARTERY OF NATIVE HEART WITHOUT ANGINA PECTORIS: Primary | ICD-10-CM

## 2022-01-10 PROCEDURE — G8482 FLU IMMUNIZE ORDER/ADMIN: HCPCS | Performed by: INTERNAL MEDICINE

## 2022-01-10 PROCEDURE — G8427 DOCREV CUR MEDS BY ELIG CLIN: HCPCS | Performed by: INTERNAL MEDICINE

## 2022-01-10 PROCEDURE — G8417 CALC BMI ABV UP PARAM F/U: HCPCS | Performed by: INTERNAL MEDICINE

## 2022-01-10 PROCEDURE — 99214 OFFICE O/P EST MOD 30 MIN: CPT | Performed by: INTERNAL MEDICINE

## 2022-01-10 PROCEDURE — 1036F TOBACCO NON-USER: CPT | Performed by: INTERNAL MEDICINE

## 2022-01-10 PROCEDURE — 93000 ELECTROCARDIOGRAM COMPLETE: CPT | Performed by: INTERNAL MEDICINE

## 2022-01-10 PROCEDURE — 3017F COLORECTAL CA SCREEN DOC REV: CPT | Performed by: INTERNAL MEDICINE

## 2022-01-10 RX ORDER — ATENOLOL 25 MG/1
25 TABLET ORAL 3 TIMES DAILY
Qty: 270 TABLET | Refills: 3 | Status: SHIPPED
Start: 2022-01-10 | End: 2022-06-20

## 2022-01-10 NOTE — PATIENT INSTRUCTIONS
1. Will obtain cardiac MRI report from [facility]  2. Continue  practice intermittent fasting along with Metformin 500 mg p.o. twice daily for weight loss management. 3. Continue BiPAP for sleep apnea syndrome. 4. Continue Tenormin for palpitations  5. Continue Simvastatin  6. Restrict calorie and continue regular exercise program  7. Follow up with me in 12 months.
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
## 2022-01-24 DIAGNOSIS — E27.8 ADRENAL MASS 1 CM TO 4 CM IN DIAMETER (HCC): ICD-10-CM

## 2022-01-24 DIAGNOSIS — E27.8 ADRENAL NODULE (HCC): ICD-10-CM

## 2022-01-24 DIAGNOSIS — I10 ESSENTIAL HYPERTENSION: ICD-10-CM

## 2022-01-24 DIAGNOSIS — R16.0 HEPATOMEGALY: ICD-10-CM

## 2022-01-24 DIAGNOSIS — R10.12 LEFT UPPER QUADRANT ABDOMINAL PAIN: ICD-10-CM

## 2022-01-24 DIAGNOSIS — K76.0 NAFLD (NONALCOHOLIC FATTY LIVER DISEASE): ICD-10-CM

## 2022-01-24 LAB
ANION GAP SERPL CALCULATED.3IONS-SCNC: 13 MMOL/L (ref 7–16)
BUN BLDV-MCNC: 13 MG/DL (ref 6–20)
CALCIUM SERPL-MCNC: 10.2 MG/DL (ref 8.6–10.2)
CHLORIDE BLD-SCNC: 100 MMOL/L (ref 98–107)
CO2: 26 MMOL/L (ref 22–29)
CORTISOL TOTAL: 5.25 MCG/DL (ref 2.68–18.4)
CREAT SERPL-MCNC: 0.8 MG/DL (ref 0.5–1)
FERRITIN: 33 NG/ML
GFR AFRICAN AMERICAN: >60
GFR NON-AFRICAN AMERICAN: >60 ML/MIN/1.73
GLUCOSE BLD-MCNC: 102 MG/DL (ref 74–99)
IRON % SATURATION: 12 % (ref 15–50)
IRON: 46 MCG/DL (ref 37–145)
POTASSIUM SERPL-SCNC: 4.1 MMOL/L (ref 3.5–5)
SODIUM BLD-SCNC: 139 MMOL/L (ref 132–146)
TOTAL IRON BINDING CAPACITY: 396 MCG/DL (ref 250–450)

## 2022-01-24 RX ORDER — VALSARTAN 80 MG/1
80 TABLET ORAL DAILY
Qty: 90 TABLET | Refills: 5 | Status: SHIPPED
Start: 2022-01-24 | End: 2022-02-17 | Stop reason: SDUPTHER

## 2022-01-25 LAB
HAV IGM SER IA-ACNC: NORMAL
HEPATITIS B CORE IGM ANTIBODY: NORMAL
HEPATITIS B SURFACE ANTIGEN INTERPRETATION: NORMAL
HEPATITIS C ANTIBODY INTERPRETATION: NORMAL

## 2022-01-26 ENCOUNTER — OFFICE VISIT (OUTPATIENT)
Dept: BARIATRICS/WEIGHT MGMT | Age: 57
End: 2022-01-26
Payer: COMMERCIAL

## 2022-01-26 VITALS
HEART RATE: 68 BPM | HEIGHT: 63 IN | SYSTOLIC BLOOD PRESSURE: 132 MMHG | WEIGHT: 293 LBS | BODY MASS INDEX: 51.91 KG/M2 | TEMPERATURE: 96.6 F | DIASTOLIC BLOOD PRESSURE: 69 MMHG

## 2022-01-26 DIAGNOSIS — G47.33 SLEEP APNEA, OBSTRUCTIVE: Primary | ICD-10-CM

## 2022-01-26 DIAGNOSIS — E66.01 CLASS 3 SEVERE OBESITY DUE TO EXCESS CALORIES WITHOUT SERIOUS COMORBIDITY WITH BODY MASS INDEX (BMI) OF 50.0 TO 59.9 IN ADULT (HCC): ICD-10-CM

## 2022-01-26 LAB — CERULOPLASMIN: 35 MG/DL (ref 16–45)

## 2022-01-26 PROCEDURE — G8428 CUR MEDS NOT DOCUMENT: HCPCS | Performed by: INTERNAL MEDICINE

## 2022-01-26 PROCEDURE — G8482 FLU IMMUNIZE ORDER/ADMIN: HCPCS | Performed by: INTERNAL MEDICINE

## 2022-01-26 PROCEDURE — 99214 OFFICE O/P EST MOD 30 MIN: CPT | Performed by: INTERNAL MEDICINE

## 2022-01-26 PROCEDURE — 1036F TOBACCO NON-USER: CPT | Performed by: INTERNAL MEDICINE

## 2022-01-26 PROCEDURE — 99211 OFF/OP EST MAY X REQ PHY/QHP: CPT

## 2022-01-26 PROCEDURE — 3017F COLORECTAL CA SCREEN DOC REV: CPT | Performed by: INTERNAL MEDICINE

## 2022-01-26 PROCEDURE — G8417 CALC BMI ABV UP PARAM F/U: HCPCS | Performed by: INTERNAL MEDICINE

## 2022-01-26 NOTE — PROGRESS NOTES
CC -   LUC, Obesity    BACKGROUND -   Last visit: 12/30/21  First visit: 11/18/21     Obesity   Began in childhood  Initial BMI 61.6, Wt 347.6 lbs, Ht 5' 3\"  HS Grad wt 190 lbs   Lowest   wt  165 lbs age 24  Highest  wt  416 lbs  Pattern of wt gain: grad overall, but with sig losses followed by rapid regain  Wt change past yr: gained 40 lbs, then down 35 lbs  Most wt lost: 94 lbs (food logging + exercise)  Other diets attempted: [employer], SF, Meridia, Fad    Desire to lose wt: 10/10  Prob posed by appetite: 7/10    Initial Diet:    Number of meals per day - 4    Number of snacks per day - 6    Meal volume - 12\" plate, sometimes seconds    Fast food/convenience store - 6-8x/week    Restaurants (not fast food) - 1-2x/week   Sweets - 7d/week (8-10 oreos/day or other chocolate snacks/desserts)   Chips - 5-6d/week (8 oz bag)   Crackers/pretzels - 2d/week (2 cups Cheezits)   Nuts - 0d/week   Peanut Butter - 3d/week (4 Tbsp on bread as a meal)   Popcorn - 2d/week (reg sized MW bag)   Dried fruit - 0d/week   Whole fruit - 5-6d/week (1 serving)   Breakfast cereal - 0-1d/week   Granola/Protein/Energy bar - 0d/week   Sugar sweetened beverages - 40 oz reg soda/d, SB Trenta Refresher Tea 4x/wk (190 each)   Protein - No supplements   Fiber - No supplements     Exercise:    Gym membership - none    Walking - none    Running - none    Resistance - none    Aerobic class - none    ______________________    [facility] -  Past Medical History:   Diagnosis Date    Anxiety     Coronary arteriosclerosis     Degenerative joint disease     involving multiple joints    Depression     Hyperlipidemia     Hypothyroidism     MI, old 0    Obesity     Palpitations     Peptic reflux disease     Sleep apnea      Current Outpatient Medications   Medication Sig Dispense Refill    valsartan (DIOVAN) 80 MG tablet Take 1 tablet by mouth daily 90 tablet 5    atenolol (TENORMIN) 25 MG tablet Take 1 tablet by mouth 3 times daily 270 tablet 3    home    321.0 lbs 01/26/  Total wt loss to date:  26.6 lbs  DEN = 1900 rachel/d = 13,300 rachel/wk  Avg daily energy variance:   11/18/21 - 12/30/21 = - 15.4 lbs (53,900 rachel)/30d = - 1,800 rachel/d deficit   12/30/21 - 01/26/21 = - 11.0 lbs (38,500 rachel)/27d = - 1,426 rachel/d deficit  Wt effect of HR foods = Restaurant food 1275 rachel/w + Sweets 3,465 + Chips/Crackers/PC 8,240 + SSB 4,120 = 17,100 rachel/wk = 2,442 rachel/d = 128% DEN = 244 lbs/yr  The above numbers for HR foods are inaccurately high. However, they still serve to show that these foods occupy an inordinate percentage of her daily energy needs. Reduction of them alone, therefore, may be sufficient to produce an acceptable rate of weight loss. She states that she struggles with emotional eating. Her depression is uncontrolled at the present time. Since she also has anxiety, venlafaxine should be added to bupropion. (The dose of amitriptyline is small and therefore the increased risk of serotonin syndrome is very small; I discussed this with her.) My hope is that she will experience some appetite suppression from the venlafaxine. She has a strong family history of early heart disease and some issues in the past with ectopy. Phentermine is therefore not a good choice for her. She preferred venlafaxine over topiramate b/c of the side effect profile (also she is taking a loop diurectic)  Did not tolerate venlafaxine due to poor overall sense of well being. However, would like to try it again. No other options are available other than topiramate.   Retried it at her request on 12/30/21 and she was able to tolerate it  Update:   Counting - 7d/wk, avg calorie intake 2384-7510 rachel/day (uses My Fitness Pal)  Sweets - 0d/wk  Salty snacks - <100 rachel/day, 3d/wk  Restaurant food - 0x/wk  SSB - 0x/month  Exercise - none yet  Protein intake - avg >60 grams (uses a food logging camila that includes macronutrients)  Taking Venlafaxine 37.5 mg, one tablet every day, 9/10 appetite suppression, Side effects - none, + elevation of mood, + dry mouth (but this may be due to her CPAP)  BP is nml at home  Assessment  - Improved; cont same plan and venlafaxine  Plan   -   Patient Instructions   Count every calorie every day    Limit calories to 1500 rachel/day    Limit sweets to one day per month    Limit salty snacks to 100 rachel/day    Do not drink any sugar sweetened beverages    Limit restaurant food to once every two weeks    Protein intake should be 65 grams/day    Fiber intake should be 22 grams/day (work up to this amount slowly; use wheat dextrin)    Take venlafaxine ER 37.5 mg one tablet every day.      Follow up  4-6 weeks    Total time spent on the encounter: 31 min    Beth Porras MD  Endocrinology/Obesity  1/26/22

## 2022-01-27 LAB
ADRENOCORTICOTROPIC HORMONE: 20.9 PG/ML (ref 7.2–63.3)
ALDOSTERONE: 8.8 NG/DL

## 2022-01-28 LAB
ALPHA-1 ANTITRYPSIN PHENOTYPE: NORMAL
ALPHA-1 ANTITRYPSIN: 139 MG/DL (ref 90–200)

## 2022-01-29 LAB
METANEPH/PLASMA INTERP: NORMAL
METANEPHRINE FREE PLASMA: 0.14 NMOL/L (ref 0–0.49)
NORMETANEPHRINE FREE PLASMA: 0.53 NMOL/L (ref 0–0.89)

## 2022-01-30 LAB — RENIN ACTIVITY: 8.3 NG/ML/HR

## 2022-02-04 ENCOUNTER — TELEPHONE (OUTPATIENT)
Dept: BARIATRICS/WEIGHT MGMT | Age: 57
End: 2022-02-04

## 2022-02-04 NOTE — TELEPHONE ENCOUNTER
Labs from 1/24/22 reveiwed  Cortisol and ACTH nml  Plasma metanephrines nml  Aldosterone nml  Renin is elevated.  This is likely due to her taking valsartan  SDR  02/04/22

## 2022-02-15 DIAGNOSIS — R73.03 PRE-DIABETES: ICD-10-CM

## 2022-02-15 DIAGNOSIS — E78.2 MIXED HYPERLIPIDEMIA: Chronic | ICD-10-CM

## 2022-02-15 DIAGNOSIS — M81.0 AGE-RELATED OSTEOPOROSIS WITHOUT CURRENT PATHOLOGICAL FRACTURE: ICD-10-CM

## 2022-02-15 DIAGNOSIS — E03.9 ACQUIRED HYPOTHYROIDISM: Chronic | ICD-10-CM

## 2022-02-15 DIAGNOSIS — Z00.01 ENCOUNTER FOR ANNUAL GENERAL MEDICAL EXAMINATION WITH ABNORMAL FINDINGS IN ADULT: ICD-10-CM

## 2022-02-15 DIAGNOSIS — K76.0 NAFLD (NONALCOHOLIC FATTY LIVER DISEASE): Primary | ICD-10-CM

## 2022-02-15 LAB
BASOPHILS ABSOLUTE: 0.03 E9/L (ref 0–0.2)
BASOPHILS RELATIVE PERCENT: 0.5 % (ref 0–2)
CHOLESTEROL, TOTAL: 146 MG/DL (ref 0–199)
EOSINOPHILS ABSOLUTE: 0.02 E9/L (ref 0.05–0.5)
EOSINOPHILS RELATIVE PERCENT: 0.3 % (ref 0–6)
HBA1C MFR BLD: 5.4 % (ref 4–5.6)
HCT VFR BLD CALC: 40.1 % (ref 34–48)
HDLC SERPL-MCNC: 44 MG/DL
HEMOGLOBIN: 11.8 G/DL (ref 11.5–15.5)
IMMATURE GRANULOCYTES #: 0.03 E9/L
IMMATURE GRANULOCYTES %: 0.5 % (ref 0–5)
LDL CHOLESTEROL CALCULATED: 66 MG/DL (ref 0–99)
LYMPHOCYTES ABSOLUTE: 1.24 E9/L (ref 1.5–4)
LYMPHOCYTES RELATIVE PERCENT: 21.4 % (ref 20–42)
MCH RBC QN AUTO: 22.3 PG (ref 26–35)
MCHC RBC AUTO-ENTMCNC: 29.4 % (ref 32–34.5)
MCV RBC AUTO: 75.9 FL (ref 80–99.9)
MONOCYTES ABSOLUTE: 0.37 E9/L (ref 0.1–0.95)
MONOCYTES RELATIVE PERCENT: 6.4 % (ref 2–12)
NEUTROPHILS ABSOLUTE: 4.11 E9/L (ref 1.8–7.3)
NEUTROPHILS RELATIVE PERCENT: 70.9 % (ref 43–80)
PDW BLD-RTO: 16.2 FL (ref 11.5–15)
PLATELET # BLD: 232 E9/L (ref 130–450)
PMV BLD AUTO: 10.3 FL (ref 7–12)
RBC # BLD: 5.28 E12/L (ref 3.5–5.5)
T4 TOTAL: 10.7 MCG/DL (ref 4.5–11.7)
TRIGL SERPL-MCNC: 178 MG/DL (ref 0–149)
TSH SERPL DL<=0.05 MIU/L-ACNC: 0.41 UIU/ML (ref 0.27–4.2)
VITAMIN D 25-HYDROXY: 38 NG/ML (ref 30–100)
VLDLC SERPL CALC-MCNC: 36 MG/DL
WBC # BLD: 5.8 E9/L (ref 4.5–11.5)

## 2022-02-17 ENCOUNTER — OFFICE VISIT (OUTPATIENT)
Dept: PRIMARY CARE CLINIC | Age: 57
End: 2022-02-17
Payer: COMMERCIAL

## 2022-02-17 VITALS
DIASTOLIC BLOOD PRESSURE: 78 MMHG | WEIGHT: 293 LBS | BODY MASS INDEX: 51.91 KG/M2 | SYSTOLIC BLOOD PRESSURE: 134 MMHG | HEIGHT: 63 IN | TEMPERATURE: 96.9 F

## 2022-02-17 DIAGNOSIS — G47.33 SLEEP APNEA, OBSTRUCTIVE: Chronic | ICD-10-CM

## 2022-02-17 DIAGNOSIS — F41.9 ANXIETY: ICD-10-CM

## 2022-02-17 DIAGNOSIS — E78.2 MIXED HYPERLIPIDEMIA: Chronic | ICD-10-CM

## 2022-02-17 DIAGNOSIS — K29.50 OTHER CHRONIC GASTRITIS WITHOUT HEMORRHAGE: ICD-10-CM

## 2022-02-17 DIAGNOSIS — I25.10 CORONARY ARTERY DISEASE INVOLVING NATIVE CORONARY ARTERY OF NATIVE HEART WITHOUT ANGINA PECTORIS: Chronic | ICD-10-CM

## 2022-02-17 DIAGNOSIS — E03.9 ACQUIRED HYPOTHYROIDISM: Chronic | ICD-10-CM

## 2022-02-17 DIAGNOSIS — Z00.01 ENCOUNTER FOR ANNUAL GENERAL MEDICAL EXAMINATION WITH ABNORMAL FINDINGS IN ADULT: Primary | ICD-10-CM

## 2022-02-17 DIAGNOSIS — E11.9 TYPE 2 DIABETES MELLITUS WITHOUT COMPLICATION, WITHOUT LONG-TERM CURRENT USE OF INSULIN (HCC): ICD-10-CM

## 2022-02-17 DIAGNOSIS — I10 ESSENTIAL HYPERTENSION: Chronic | ICD-10-CM

## 2022-02-17 DIAGNOSIS — K43.9 VENTRAL HERNIA WITHOUT OBSTRUCTION OR GANGRENE: ICD-10-CM

## 2022-02-17 PROCEDURE — G8482 FLU IMMUNIZE ORDER/ADMIN: HCPCS | Performed by: FAMILY MEDICINE

## 2022-02-17 PROCEDURE — 99396 PREV VISIT EST AGE 40-64: CPT | Performed by: FAMILY MEDICINE

## 2022-02-17 RX ORDER — TORSEMIDE 10 MG/1
10 TABLET ORAL DAILY
Qty: 90 TABLET | Refills: 5 | Status: SHIPPED
Start: 2022-02-17 | End: 2022-08-09 | Stop reason: SDUPTHER

## 2022-02-17 RX ORDER — PANTOPRAZOLE SODIUM 40 MG/1
40 TABLET, DELAYED RELEASE ORAL DAILY
Qty: 90 TABLET | Refills: 3 | Status: SHIPPED | OUTPATIENT
Start: 2022-02-17 | End: 2023-02-18

## 2022-02-17 RX ORDER — POTASSIUM CHLORIDE 20 MEQ/1
20 TABLET, EXTENDED RELEASE ORAL DAILY
Qty: 90 TABLET | Refills: 12 | Status: SHIPPED
Start: 2022-02-17 | End: 2022-08-09 | Stop reason: SDUPTHER

## 2022-02-17 RX ORDER — AMITRIPTYLINE HYDROCHLORIDE 25 MG/1
25 TABLET, FILM COATED ORAL NIGHTLY
Qty: 90 TABLET | Refills: 5 | Status: SHIPPED | OUTPATIENT
Start: 2022-02-17

## 2022-02-17 RX ORDER — BUPROPION HYDROCHLORIDE 150 MG/1
150 TABLET ORAL EVERY MORNING
Qty: 90 TABLET | Refills: 5 | Status: SHIPPED | OUTPATIENT
Start: 2022-02-17

## 2022-02-17 RX ORDER — SIMVASTATIN 40 MG
40 TABLET ORAL NIGHTLY
Qty: 90 TABLET | Refills: 12 | Status: SHIPPED | OUTPATIENT
Start: 2022-02-17

## 2022-02-17 RX ORDER — VALSARTAN 80 MG/1
80 TABLET ORAL DAILY
Qty: 90 TABLET | Refills: 5 | Status: SHIPPED | OUTPATIENT
Start: 2022-02-17

## 2022-02-17 RX ORDER — LEVOTHYROXINE SODIUM 0.15 MG/1
150 TABLET ORAL DAILY
Qty: 90 TABLET | Refills: 5 | Status: SHIPPED | OUTPATIENT
Start: 2022-02-17

## 2022-02-17 ASSESSMENT — PATIENT HEALTH QUESTIONNAIRE - PHQ9
SUM OF ALL RESPONSES TO PHQ9 QUESTIONS 1 & 2: 0
SUM OF ALL RESPONSES TO PHQ QUESTIONS 1-9: 0
2. FEELING DOWN, DEPRESSED OR HOPELESS: 0
SUM OF ALL RESPONSES TO PHQ QUESTIONS 1-9: 0
SUM OF ALL RESPONSES TO PHQ QUESTIONS 1-9: 0
1. LITTLE INTEREST OR PLEASURE IN DOING THINGS: 0
SUM OF ALL RESPONSES TO PHQ QUESTIONS 1-9: 0

## 2022-02-17 ASSESSMENT — ENCOUNTER SYMPTOMS
GASTROINTESTINAL NEGATIVE: 1
RESPIRATORY NEGATIVE: 1
EYES NEGATIVE: 1
ALLERGIC/IMMUNOLOGIC NEGATIVE: 1

## 2022-02-17 NOTE — PROGRESS NOTES
22  Name: Mary Saeed    : 1965    Sex: female    Age: 61 y.o. Subjective:  Chief Complaint: Patient is here for yearly  Wellness ck and bp     anx  daib   weight    Losing weight  With dr Denilson flores and dyu eyerly  No cp ro sob  Knee pain bete with wt loss   Sob  resovled      Review of Systems   Constitutional: Negative. HENT: Negative. Eyes: Negative. Respiratory: Negative. Cardiovascular: Negative. Gastrointestinal: Negative. Endocrine: Negative. Genitourinary: Negative. Musculoskeletal: Negative. Skin: Negative. Allergic/Immunologic: Negative. Neurological: Negative. Hematological: Negative. Psychiatric/Behavioral: Negative.           Current Outpatient Medications:     metFORMIN (GLUCOPHAGE) 500 MG tablet, Take 1 tablet by mouth 2 times daily (with meals), Disp: 180 tablet, Rfl: 5    amitriptyline (ELAVIL) 25 MG tablet, Take 1 tablet by mouth nightly, Disp: 90 tablet, Rfl: 5    valsartan (DIOVAN) 80 MG tablet, Take 1 tablet by mouth daily, Disp: 90 tablet, Rfl: 5    pantoprazole (PROTONIX) 40 MG tablet, Take 1 tablet by mouth daily, Disp: 90 tablet, Rfl: 3    levothyroxine (SYNTHROID) 150 MCG tablet, Take 1 tablet by mouth daily, Disp: 90 tablet, Rfl: 5    buPROPion (WELLBUTRIN XL) 150 MG extended release tablet, Take 1 tablet by mouth every morning, Disp: 90 tablet, Rfl: 5    torsemide (DEMADEX) 10 MG tablet, Take 1 tablet by mouth daily, Disp: 90 tablet, Rfl: 5    simvastatin (ZOCOR) 40 MG tablet, Take 1 tablet by mouth nightly, Disp: 90 tablet, Rfl: 12    potassium chloride (KLOR-CON M20) 20 MEQ extended release tablet, Take 1 tablet by mouth daily, Disp: 90 tablet, Rfl: 12    atenolol (TENORMIN) 25 MG tablet, Take 1 tablet by mouth 3 times daily, Disp: 270 tablet, Rfl: 3    venlafaxine (EFFEXOR XR) 37.5 MG extended release capsule, Take 1 capsule by mouth daily, Disp: 90 capsule, Rfl: 2    aspirin 81 MG chewable tablet,  Lack of Transportation (Non-Medical): Not on file   Physical Activity:     Days of Exercise per Week: Not on file    Minutes of Exercise per Session: Not on file   Stress:     Feeling of Stress : Not on file   Social Connections:     Frequency of Communication with Friends and Family: Not on file    Frequency of Social Gatherings with Friends and Family: Not on file    Attends Hoahaoism Services: Not on file    Active Member of [address] [place] [address] or Organizations: Not on file    Attends Club or Organization Meetings: Not on file    Marital Status: Not on file   Intimate Partner Violence:     Fear of Current or Ex-Partner: Not on file    Emotionally Abused: Not on file    Physically Abused: Not on file    Sexually Abused: Not on file   Housing Stability:     Unable to Pay for Housing in the Last Year: Not on file    Number of [place] in the Last Year: Not on file    Unstable Housing in the Last Year: Not on file      Past Medical History:   Diagnosis Date    Anxiety     Coronary arteriosclerosis     Degenerative joint disease     involving multiple joints    Depression     Hyperlipidemia     Hypothyroidism     MI, old 0    Obesity     Palpitations     Peptic reflux disease     Sleep apnea      Family History   Problem Relation Age of Onset    Cancer Mother     Heart Attack Father     Cancer Maternal Grandmother     Heart Attack Paternal Grandmother       Past Surgical History:   Procedure Laterality Date     SECTION  1994     SECTION  1996    DIAGNOSTIC CARDIAC CATH LAB PROCEDURE      ENDOMETRIAL ABLATION      LIPOMA RESECTION      TONSILLECTOMY  1972      Vitals:    22 0746   BP: 134/78   Temp: 96.9 °F (36.1 °C)   TempSrc: Infrared   Weight: (!) 316 lb (143.3 kg)   Height: 5' 3\" (1.6 m)       Objective:    Physical Exam  Vitals reviewed. Constitutional:       Appearance: She is well-developed. She is obese. HENT:      Head: Normocephalic.    Eyes: Pupils: Pupils are equal, round, and reactive to light. Cardiovascular:      Rate and Rhythm: Normal rate and regular rhythm. Pulmonary:      Effort: Pulmonary effort is normal.      Breath sounds: Normal breath sounds. Abdominal:      Palpations: Abdomen is soft. Musculoskeletal:         General: Normal range of motion. Cervical back: Normal range of motion. Skin:     General: Skin is warm. Neurological:      Mental Status: She is alert and oriented to person, place, and time. Psychiatric:         Behavior: Behavior normal.         Eliane Lopes was seen today for annual exam.    Diagnoses and all orders for this visit:    Encounter for annual general medical examination with abnormal findings in adult    Acquired hypothyroidism  -     levothyroxine (SYNTHROID) 150 MCG tablet; Take 1 tablet by mouth daily    Coronary artery disease involving native coronary artery of native heart without angina pectoris    Essential hypertension  -     valsartan (DIOVAN) 80 MG tablet; Take 1 tablet by mouth daily  -     torsemide (DEMADEX) 10 MG tablet; Take 1 tablet by mouth daily  -     potassium chloride (KLOR-CON M20) 20 MEQ extended release tablet; Take 1 tablet by mouth daily    Mixed hyperlipidemia  -     simvastatin (ZOCOR) 40 MG tablet; Take 1 tablet by mouth nightly    Sleep apnea, obstructive    Diet-controlled diabetes mellitus ([facility] [address]  -     metFORMIN (GLUCOPHAGE) 500 MG tablet; Take 1 tablet by mouth 2 times daily (with meals)    Ventral hernia without obstruction or gangrene  -     pantoprazole (PROTONIX) 40 MG tablet; Take 1 tablet by mouth daily    Other chronic gastritis without hemorrhage  -     pantoprazole (PROTONIX) 40 MG tablet; Take 1 tablet by mouth daily    Anxiety  -     buPROPion (WELLBUTRIN XL) 150 MG extended release tablet; Take 1 tablet by mouth every morning    Other orders  -     amitriptyline (ELAVIL) 25 MG tablet;  Take 1 tablet by mouth nightly        Comments:   Diet exer  Hm isuses lose wt     [facility]         I educated the patient about all medications. Make sure they were correct and educated  on the risk associated with missing meds or taking them incorrectly. A list of medications is being sent home with patient today. Check blood pressure at home twice a day. Low-salt low caffeine diet. Call if systolic blood pressure is above 912 and diastolic blood pressures above 85. Only use a upper arm digital cuff. Aggressive low-fat diet. Avoid red meats, greasy fried foods, dairy products. Avoid processed foods. Take cholesterol medications without food. Check blood sugars 4 times a day. Aggressive low, sugar low carbohydrate diet. Call if blood sugar less than 70 or over 200. Avoid eating in between meals. Lose weight. Exercise. I informed patient about the risk associated with noncompliance of medication and taking medications incorrectly. Appropriate follow-up with myself and all specialist.  Encourage family members to take active role in assisting with medications and medical care. If any confusion should develop to notify my office immediately to avoid risk of worsening medical condition    A great deal of time spent reviewing medications, diet, exercise, social issues. Also reviewing the chart before entering the room with patient and finishing charting after leaving patient's room. More than half of that time was spent face to face with the patient in counseling and coordinating care. Follow Up: Return in about 6 months (around 8/17/2022) for Lab Before.      Seen by:  Sofi Douglass DO

## 2022-03-03 ENCOUNTER — OFFICE VISIT (OUTPATIENT)
Dept: BARIATRICS/WEIGHT MGMT | Age: 57
End: 2022-03-03
Payer: COMMERCIAL

## 2022-03-03 VITALS
TEMPERATURE: 97.2 F | HEIGHT: 63 IN | SYSTOLIC BLOOD PRESSURE: 144 MMHG | BODY MASS INDEX: 51.91 KG/M2 | DIASTOLIC BLOOD PRESSURE: 59 MMHG | WEIGHT: 293 LBS | HEART RATE: 70 BPM

## 2022-03-03 DIAGNOSIS — E66.01 CLASS 3 SEVERE OBESITY DUE TO EXCESS CALORIES WITHOUT SERIOUS COMORBIDITY WITH BODY MASS INDEX (BMI) OF 50.0 TO 59.9 IN ADULT (HCC): ICD-10-CM

## 2022-03-03 DIAGNOSIS — G47.33 SLEEP APNEA, OBSTRUCTIVE: Primary | ICD-10-CM

## 2022-03-03 PROCEDURE — G8482 FLU IMMUNIZE ORDER/ADMIN: HCPCS | Performed by: INTERNAL MEDICINE

## 2022-03-03 PROCEDURE — G8417 CALC BMI ABV UP PARAM F/U: HCPCS | Performed by: INTERNAL MEDICINE

## 2022-03-03 PROCEDURE — 3017F COLORECTAL CA SCREEN DOC REV: CPT | Performed by: INTERNAL MEDICINE

## 2022-03-03 PROCEDURE — 99211 OFF/OP EST MAY X REQ PHY/QHP: CPT | Performed by: INTERNAL MEDICINE

## 2022-03-03 PROCEDURE — 99215 OFFICE O/P EST HI 40 MIN: CPT | Performed by: INTERNAL MEDICINE

## 2022-03-03 PROCEDURE — 1036F TOBACCO NON-USER: CPT | Performed by: INTERNAL MEDICINE

## 2022-03-03 PROCEDURE — G8428 CUR MEDS NOT DOCUMENT: HCPCS | Performed by: INTERNAL MEDICINE

## 2022-03-03 NOTE — PROGRESS NOTES
CC -   LUC, Obesity    BACKGROUND -   Last visit: 01/26/22  First visit: 11/18/21     Obesity   Began in childhood  Initial BMI 61.6, Wt 347.6 lbs, Ht 5' 3\"  HS Grad wt 190 lbs   Lowest   wt  165 lbs age 24  Highest  wt  416 lbs  Pattern of wt gain: grad overall, but with sig losses followed by rapid regain  Wt change past yr: gained 40 lbs, then down 35 lbs  Most wt lost: 94 lbs (food logging + exercise)  Other diets attempted: [employer], SF, Meridia, Fad    Desire to lose wt: 10/10  Prob posed by appetite: 7/10    Initial Diet:    Number of meals per day - 4    Number of snacks per day - 6    Meal volume - 12\" plate, sometimes seconds    Fast food/convenience store - 6-8x/week    Restaurants (not fast food) - 1-2x/week   Sweets - 7d/week (8-10 oreos/day or other chocolate snacks/desserts)   Chips - 5-6d/week (8 oz bag)   Crackers/pretzels - 2d/week (2 cups Cheezits)   Nuts - 0d/week   Peanut Butter - 3d/week (4 Tbsp on bread as a meal)   Popcorn - 2d/week (reg sized MW bag)   Dried fruit - 0d/week   Whole fruit - 5-6d/week (1 serving)   Breakfast cereal - 0-1d/week   Granola/Protein/Energy bar - 0d/week   Sugar sweetened beverages - 40 oz reg soda/d, SB Trenta Refresher Tea 4x/wk (190 each)   Protein - No supplements   Fiber - No supplements     Exercise:    Gym membership - none    Walking - none    Running - none    Resistance - none    Aerobic class - none    ______________________    [facility] -  Past Medical History:   Diagnosis Date    Anxiety     Coronary arteriosclerosis     Degenerative joint disease     involving multiple joints    Depression     Hyperlipidemia     Hypothyroidism     MI, old [address]    Obesity     Palpitations     Peptic reflux disease     Sleep apnea      Current Outpatient Medications   Medication Sig Dispense Refill    metFORMIN (GLUCOPHAGE) 500 MG tablet Take 1 tablet by mouth 2 times daily (with meals) 180 tablet 5    amitriptyline (ELAVIL) 25 MG tablet Take 1 tablet by mouth nightly 90 tablet 5    valsartan (DIOVAN) 80 MG tablet Take 1 tablet by mouth daily 90 tablet 5    pantoprazole (PROTONIX) 40 MG tablet Take 1 tablet by mouth daily 90 tablet 3    levothyroxine (SYNTHROID) 150 MCG tablet Take 1 tablet by mouth daily 90 tablet 5    buPROPion (WELLBUTRIN XL) 150 MG extended release tablet Take 1 tablet by mouth every morning 90 tablet 5    torsemide (DEMADEX) 10 MG tablet Take 1 tablet by mouth daily 90 tablet 5    simvastatin (ZOCOR) 40 MG tablet Take 1 tablet by mouth nightly 90 tablet 12    potassium chloride (KLOR-CON M20) 20 MEQ extended release tablet Take 1 tablet by mouth daily 90 tablet 12    atenolol (TENORMIN) 25 MG tablet Take 1 tablet by mouth 3 times daily 270 tablet 3    venlafaxine (EFFEXOR XR) 37.5 MG extended release capsule Take 1 capsule by mouth daily 90 capsule 2    aspirin 81 MG chewable tablet Take 81 mg by mouth daily        No current facility-administered medications for this visit. PE -  Gen : BP (!) 144/59 (Site: Left Upper Arm, Position: Sitting, Cuff Size: Thigh)   Pulse 70   Temp 97.2 °F (36.2 °C) (Temporal)   Ht 5' 3\" (1.6 m)   Wt (!) 308 lb 12.8 oz (140.1 kg)   BMI 54.70 kg/m²    WN, WD, NAD  Heart:  RRR w/o MGR, no Carotid bruits  Lung: Nml resp effort, CTA b/l  Psych: Normal mood   Full affect  Neuro:  Moves all ext well  ______________________    HISTORY & ASSESSMENT/PLAN -     Problem 1 - LUC  HPI  - Diagnosed 2/21/21   Severe   Followed by Dr. Rupa Redding   Wears APAP 7d/wk, full duration of sleep   Daytime sleepiness 2/10 (last visit 1/10)  Assessment  - Controlled with positive pressure support, excess weight is worsening her underlying airway obstruction and impairing her oxygenation  Plan - Wt reduction per the plan below    Problem 2 - Obesity  HPI - See above Background for description   Weight  Date   347.6 lbs 11/18/21 11/30/21  349.0 lbs home, started diet on this day   332.0 lbs 12/30/21  332.0 lbs home   321.0 lbs 01/26/22 308.8 lbs 03/03/22  306.4 lbs home  Total wt loss to date:  38.8 lbs  DEN = 1900 rachel/d = 13,300 rachel/wk  Avg daily energy variance:   11/30/21 - 12/30/21 = - 15.4 lbs (53,900 rachel)/30d = - 1,800 rachel/d deficit   12/30/21 - 01/26/22 = - 11.0 lbs (38,500 rachel)/27d = - 1,426 rachel/d deficit   01/26/22 - 03/03/22 = - 12.4 lbs (43,400 rachel)/36d = - 1,205 rachel/d deficit  Cumulative energy variance = - 38.8 lbs (135,800 rachel)/93d = 1,460 rachel/d deficit (given a 1400 rachel/d intake, this suggest a DEN of 2860 instead of 1900)  Wt effect of HR foods = Restaurant food 1275 rachel/w + Sweets 3,465 + Chips/Crackers/PC 8,240 + SSB 4,120 = 17,100 rachel/wk = 2,442 rachel/d = 128% DEN = 244 lbs/yr  The above numbers for HR foods are inaccurately high. However, they still serve to show that these foods occupy an inordinate percentage of her daily energy needs. Reduction of them alone, therefore, may be sufficient to produce an acceptable rate of weight loss. She states that she struggles with emotional eating. Her depression is uncontrolled at the present time. Since she also has anxiety, venlafaxine should be added to bupropion. (The dose of amitriptyline is small and therefore the increased risk of serotonin syndrome is very small; I discussed this with her.) My hope is that she will experience some appetite suppression from the venlafaxine. She has a strong family history of early heart disease and some issues in the past with ectopy. Phentermine is therefore not a good choice for her. She preferred venlafaxine over topiramate b/c of the side effect profile (also she is taking a loop diurectic)  Did not tolerate venlafaxine due to poor overall sense of well being. However, would like to try it again. No other options are available other than topiramate.   Retried it at her request on 12/30/21 and she was able to tolerate it  Update:   Counting - 7d/wk, avg calorie intake 1400 rachel/day (uses My Fitness Pal)  Sweets - 1d/mo  Salty snacks - <100 rachel/day, 3d/wk  Restaurant food - 1x/mo  SSB - none  Fiber - 25 grams/day  Exercise - walking 15 min, 1-3d/wk (limited by knee pain)  Protein intake - avg 75 grams (uses a food logging camila that includes macronutrients)  Taking Venlafaxine 37.5 mg, one tablet every day, 10/10 appetite suppression, Side effects - none, + elevation of mood, + dry mouth (but this may be due to her CPAP)  Water 60 oz daily  Assessment  - Improved; cont same plan and venlafaxine, needs to increase water intake  Plan   -   Patient Instructions   Count every calorie every day    Limit calories to 1500 rachel/day    Limit sweets to one day per month    Limit salty snacks to 100 rachel/day    Do not drink any sugar sweetened beverages    Limit restaurant food to once every two weeks    Protein intake should be 65 grams/day    Fiber intake should be 22 grams/day (work up to this amount slowly; use wheat dextrin)    Take venlafaxine ER 37.5 mg one tablet every day.      Make sure that water intake is >96 oz/day    Follow up  4-6 weeks    Total time spent on the encounter: 47 min    Romana Fujisawa, MD  Endocrinology/Obesity  3/3/22

## 2022-03-03 NOTE — PATIENT INSTRUCTIONS
Count every calorie every day    Limit calories to 1500 rachel/day    Limit sweets to one day per month    Limit salty snacks to 100 rachel/day    Do not drink any sugar sweetened beverages    Limit restaurant food to once every two weeks    Protein intake should be 65 grams/day    Fiber intake should be 22 grams/day (work up to this amount slowly; use wheat dextrin)    Take venlafaxine ER 37.5 mg one tablet every day.      Make sure that water intake is >96 oz/day    Follow up  4-6 weeks

## 2022-04-08 ENCOUNTER — TELEPHONE (OUTPATIENT)
Dept: HEMATOLOGY | Age: 57
End: 2022-04-08

## 2022-04-08 NOTE — TELEPHONE ENCOUNTER
Spoke to Leslie huber about her scheduled US. She confirmed appt date and time of 4/26/22 at [address] [place] [address] was given instructions to fast for 8 hours prior to scan and she verbalized understanding. A f/u appt was made to review results.

## 2022-04-12 ENCOUNTER — OFFICE VISIT (OUTPATIENT)
Dept: BARIATRICS/WEIGHT MGMT | Age: 57
End: 2022-04-12
Payer: COMMERCIAL

## 2022-04-12 VITALS
SYSTOLIC BLOOD PRESSURE: 138 MMHG | WEIGHT: 293 LBS | HEART RATE: 72 BPM | HEIGHT: 63 IN | DIASTOLIC BLOOD PRESSURE: 83 MMHG | BODY MASS INDEX: 51.91 KG/M2 | TEMPERATURE: 97.8 F

## 2022-04-12 DIAGNOSIS — E66.01 CLASS 3 SEVERE OBESITY DUE TO EXCESS CALORIES WITHOUT SERIOUS COMORBIDITY WITH BODY MASS INDEX (BMI) OF 50.0 TO 59.9 IN ADULT (HCC): ICD-10-CM

## 2022-04-12 DIAGNOSIS — G47.33 SLEEP APNEA, OBSTRUCTIVE: Primary | ICD-10-CM

## 2022-04-12 PROCEDURE — 1036F TOBACCO NON-USER: CPT | Performed by: INTERNAL MEDICINE

## 2022-04-12 PROCEDURE — 99211 OFF/OP EST MAY X REQ PHY/QHP: CPT

## 2022-04-12 PROCEDURE — G8428 CUR MEDS NOT DOCUMENT: HCPCS | Performed by: INTERNAL MEDICINE

## 2022-04-12 PROCEDURE — 99213 OFFICE O/P EST LOW 20 MIN: CPT | Performed by: INTERNAL MEDICINE

## 2022-04-12 PROCEDURE — G8417 CALC BMI ABV UP PARAM F/U: HCPCS | Performed by: INTERNAL MEDICINE

## 2022-04-12 PROCEDURE — 3017F COLORECTAL CA SCREEN DOC REV: CPT | Performed by: INTERNAL MEDICINE

## 2022-04-12 NOTE — PATIENT INSTRUCTIONS
Count every calorie every day    Limit calories to 1500 rachel/day    Limit sweets to one day per month    Limit salty snacks to 100 rachel/day    Do not drink any sugar sweetened beverages    Limit restaurant food to once every two weeks    Protein intake should be 65 grams/day    Fiber intake should be 22 grams/day (work up to this amount slowly; use wheat dextrin)    Take venlafaxine ER 37.5 mg one tablet every day.      Make sure that water intake is >75 oz/day    Follow up  5-7 weeks

## 2022-04-12 NOTE — PROGRESS NOTES
CC -   LUC, Obesity    BACKGROUND -   Last visit: 03/03/22  First visit: 11/18/21     Obesity   Began in childhood  Initial BMI 61.6, Wt 347.6 lbs, Ht 5' 3\"  HS Grad wt 190 lbs   Lowest   wt  165 lbs age 24  Highest  wt  416 lbs  Pattern of wt gain: grad overall, but with sig losses followed by rapid regain  Wt change past yr: gained 40 lbs, then down 35 lbs  Most wt lost: 94 lbs (food logging + exercise)  Other diets attempted: [address], Meridia, Fad    Desire to lose wt: 10/10  Prob posed by appetite: 7/10    Initial Diet:    Number of meals per day - 4    Number of snacks per day - 6    Meal volume - 12\" plate, sometimes seconds    Fast food/convenience store - 6-8x/week    Restaurants (not fast food) - 1-2x/week   Sweets - 7d/week (8-10 oreos/day or other chocolate snacks/desserts)   Chips - 5-6d/week (8 oz bag)   Crackers/pretzels - 2d/week (2 cups Cheezits)   Nuts - 0d/week   Peanut Butter - 3d/week (4 Tbsp on bread as a meal)   Popcorn - 2d/week (reg sized MW bag)   Dried fruit - 0d/week   Whole fruit - 5-6d/week (1 serving)   Breakfast cereal - 0-1d/week   Granola/Protein/Energy bar - 0d/week   Sugar sweetened beverages - 40 oz reg soda/d, SB Trenta Refresher Tea 4x/wk (190 each)   Protein - No supplements   Fiber - No supplements     Exercise:    Gym membership - none    Walking - none    Running - none    Resistance - none    Aerobic class - none    ______________________    [facility] -  Past Medical History:   Diagnosis Date    Anxiety     Coronary arteriosclerosis     Degenerative joint disease     involving multiple joints    Depression     Hyperlipidemia     Hypothyroidism     MI, old 1    Obesity     Palpitations     Peptic reflux disease     Sleep apnea      Current Outpatient Medications   Medication Sig Dispense Refill    metFORMIN (GLUCOPHAGE) 500 MG tablet Take 1 tablet by mouth 2 times daily (with meals) 180 tablet 5    amitriptyline (ELAVIL) 25 MG tablet Take 1 tablet by mouth nightly 90 tablet 5    valsartan (DIOVAN) 80 MG tablet Take 1 tablet by mouth daily 90 tablet 5    pantoprazole (PROTONIX) 40 MG tablet Take 1 tablet by mouth daily 90 tablet 3    levothyroxine (SYNTHROID) 150 MCG tablet Take 1 tablet by mouth daily 90 tablet 5    buPROPion (WELLBUTRIN XL) 150 MG extended release tablet Take 1 tablet by mouth every morning 90 tablet 5    torsemide (DEMADEX) 10 MG tablet Take 1 tablet by mouth daily 90 tablet 5    simvastatin (ZOCOR) 40 MG tablet Take 1 tablet by mouth nightly 90 tablet 12    potassium chloride (KLOR-CON M20) 20 MEQ extended release tablet Take 1 tablet by mouth daily 90 tablet 12    atenolol (TENORMIN) 25 MG tablet Take 1 tablet by mouth 3 times daily 270 tablet 3    venlafaxine (EFFEXOR XR) 37.5 MG extended release capsule Take 1 capsule by mouth daily 90 capsule 2    aspirin 81 MG chewable tablet Take 81 mg by mouth daily        No current facility-administered medications for this visit. PE -  Gen : /83 (Site: Left Upper Arm, Position: Sitting, Cuff Size: Thigh)   Pulse 72   Temp 97.8 °F (36.6 °C) (Temporal)   Ht 5' 3\" (1.6 m)   Wt 300 lb (136.1 kg)   BMI 53.14 kg/m²    WN, WD, NAD  Heart:  RRR w/o MGR, no Carotid bruits  Lung: Nml resp effort, CTA b/l  Psych: Normal mood   Full affect  Neuro:  Moves all ext well  ______________________    HISTORY & ASSESSMENT/PLAN -     Problem 1 - LUC  HPI  - Diagnosed 2/21/21   Severe   Followed by Dr. Jose E Fuentes   Wears APAP 7d/wk, full duration of sleep   Daytime sleepiness 3/10 (last visit 2/10) - attributes the increase to more hypopnic events per hour at night and \"craziness\" at work  Assessment  - Controlled with positive pressure support, excess weight is worsening her underlying airway obstruction and impairing her oxygenation  Plan - Wt reduction per the plan below    Problem 2 - Obesity  HPI - See above Background for description   Weight  Date   347.6 lbs 11/18/21 11/30/21  349.0 lbs home, started diet on this day   332.0 lbs 12/30/21  332.0 lbs home   321.0 lbs 01/26/22     308.8 lbs 03/03/22  306.4 lbs home   300.0 lbs 04/12/22  298.6 lbs home  Total wt loss to date:  47.6 lbs  DEN = 1900 rachel/d = 13,300 rachel/wk  Avg daily energy variance:   11/30/21 - 12/30/21 = - 15.4 lbs (53,900 rachel)/30d = - 1,800 rachel/d deficit   12/30/21 - 01/26/22 = - 11.0 lbs (38,500 rachel)/27d = - 1,426 rachel/d deficit   01/26/22 - 03/03/22 = - 12.4 lbs (43,400 rachel)/36d = - 1,205 rachel/d deficit   03/03/22 - 04/12/22 = -   7.8 lbs (27,300 rachel)/40d = -   682 rachel/d deficit  Cumulative energy variance = - 46.6 lbs (163,100 rachel)/133d = 1,226 rachel/d deficit (given a 6557-6378 rachel/d intake, this suggest a DEN of 2700 rachel); however for this past period it would be 682 + 1475 = 2157 rachel/d  Wt effect of HR foods = Restaurant food 1275 rachel/w + Sweets 3,465 + Chips/Crackers/PC 8,240 + SSB 4,120 = 17,100 rachel/wk = 2,442 rachel/d = 128% DEN = 244 lbs/yr  The above numbers for HR foods are inaccurately high. However, they still serve to show that these foods occupy an inordinate percentage of her daily energy needs. Reduction of them alone, therefore, may be sufficient to produce an acceptable rate of weight loss. She states that she struggles with emotional eating. Her depression is uncontrolled at the present time. Since she also has anxiety, venlafaxine should be added to bupropion. (The dose of amitriptyline is small and therefore the increased risk of serotonin syndrome is very small; I discussed this with her.) My hope is that she will experience some appetite suppression from the venlafaxine. She has a strong family history of early heart disease and some issues in the past with ectopy. Phentermine is therefore not a good choice for her. She preferred venlafaxine over topiramate b/c of the side effect profile (also she is taking a loop diurectic)  Did not tolerate venlafaxine due to poor overall sense of well being.  However, would like to try it again. No other options are available other than topiramate. Retried it at her request on 12/30/21 and she was able to tolerate it  Update:   Calorie monitoring - counting 7d/wk, avg calorie intake 6223-0302 rachel/day (closer to 1500) (uses My Fitness Pal)  Sweets - 0-1d/mo  Salty snacks - <100 rachel/day, 2d/wk  Restaurant food - 0-1x/mo  SSB - none (states she still struggles with this)  Fiber - 28 grams/day  Exercise - walking 1mi, 2d/wk (limited by knee pain)  Protein intake - avg 75 grams (uses a food logging camila that includes macronutrients)  Taking Venlafaxine 37.5 mg, one tablet every day, 7/10 appetite suppression, Side effects - none, + elevation of mood, + dry mouth (but this may be due to her CPAP)  Water 75 oz daily (96 oz caused nocturia)  Assessment  - Improved; cont same plan and venlafaxine  Plan   -   Patient Instructions   Count every calorie every day    Limit calories to 1500 rachel/day    Limit sweets to one day per month    Limit salty snacks to 100 rachel/day    Do not drink any sugar sweetened beverages    Limit restaurant food to once every two weeks    Protein intake should be 65 grams/day    Fiber intake should be 22 grams/day (work up to this amount slowly; use wheat dextrin)    Take venlafaxine ER 37.5 mg one tablet every day.      Make sure that water intake is >75 oz/day    Follow up  5-7 weeks      Ramakrishna Peralta MD  Endocrinology/Obesity  4/12/22

## 2022-04-26 ENCOUNTER — HOSPITAL ENCOUNTER (OUTPATIENT)
Dept: ULTRASOUND IMAGING | Age: 57
Discharge: HOME | End: 2022-04-28
Payer: COMMERCIAL

## 2022-04-26 DIAGNOSIS — K76.0 NAFLD (NONALCOHOLIC FATTY LIVER DISEASE): ICD-10-CM

## 2022-04-26 PROCEDURE — 76705 ECHO EXAM OF ABDOMEN: CPT

## 2022-05-06 ENCOUNTER — OFFICE VISIT (OUTPATIENT)
Dept: HEMATOLOGY | Age: 57
End: 2022-05-06
Payer: COMMERCIAL

## 2022-05-06 VITALS
WEIGHT: 292 LBS | HEIGHT: 63 IN | OXYGEN SATURATION: 97 % | TEMPERATURE: 97.8 F | HEART RATE: 67 BPM | DIASTOLIC BLOOD PRESSURE: 84 MMHG | BODY MASS INDEX: 51.74 KG/M2 | RESPIRATION RATE: 18 BRPM | SYSTOLIC BLOOD PRESSURE: 131 MMHG

## 2022-05-06 DIAGNOSIS — K76.0 NAFLD (NONALCOHOLIC FATTY LIVER DISEASE): ICD-10-CM

## 2022-05-06 DIAGNOSIS — E27.8 LEFT ADRENAL MASS (HCC): ICD-10-CM

## 2022-05-06 DIAGNOSIS — R16.0 HEPATOMEGALY: Primary | ICD-10-CM

## 2022-05-06 PROCEDURE — G8427 DOCREV CUR MEDS BY ELIG CLIN: HCPCS | Performed by: TRANSPLANT SURGERY

## 2022-05-06 PROCEDURE — 1036F TOBACCO NON-USER: CPT | Performed by: TRANSPLANT SURGERY

## 2022-05-06 PROCEDURE — 99212 OFFICE O/P EST SF 10 MIN: CPT | Performed by: TRANSPLANT SURGERY

## 2022-05-06 PROCEDURE — 3017F COLORECTAL CA SCREEN DOC REV: CPT | Performed by: TRANSPLANT SURGERY

## 2022-05-06 PROCEDURE — 99213 OFFICE O/P EST LOW 20 MIN: CPT | Performed by: TRANSPLANT SURGERY

## 2022-05-06 PROCEDURE — G8417 CALC BMI ABV UP PARAM F/U: HCPCS | Performed by: TRANSPLANT SURGERY

## 2022-05-06 NOTE — PROGRESS NOTES
Hepatobiliary and Pancreatic Surgery Attending History and Physical    Patient's Name/Date of Birth: Lucia Jaimes /1965 (15 y.o.)    Date: May 6, 2022     CC:left upper quadrant abdominal pain    HPI:  She states that overall she is feeling great and not having any issues. Her attacks are few and far between. She has lost 50lbs since our last office visit. Family history: Biologic mother  of liver cancer as well as her family. Physical Exam:  /84   Pulse 67   Temp 97.8 °F (36.6 °C) (Temporal)   Resp 18   Ht 5' 3\" (1.6 m)   Wt 292 lb (132.5 kg)   SpO2 97%   BMI 51.73 kg/m²     PSYCH: mood and affect normal, alert and oriented x 3: No apparent distress, comfortable  EYES: Sclera white, pupils equal round and reactive to light  ENMT:  Hearing normal, trachea midline, ears externally intact  LYMPH: no obvious lympadenopathy in neck. RESP: Respiratory effort was normal with no retractions or use of accessory muscles. CV:  No pedal edema  GI/ Abdomen: Soft, nondistended, nontender, no guarding, no peritoneal signs  MSK: no clubbing/ no cyanosis/ gaitnormal       Assessment/Plan:  Mind Gut phenomem, 14mm left adrenal nodule, Hepatomegaly with NAFLD, morbid obesity with BMI > 52 (was 60)  - continue elavil 25mg  - non functioning adrenal adenoma  - I reviewed their images prior to our office visit and we also reviewed them together today. - liver looks less steatotic  - CT scan in October for liver and adrenal gland surveillance     20 Minutes of which greater than 50% was spent counseling or coordinating her care. Thank you for the consultation allowing me to take part in Ms. Keller's care.      Davonte Larose M.D.  2022  12:45 PM

## 2022-05-24 ENCOUNTER — OFFICE VISIT (OUTPATIENT)
Dept: PRIMARY CARE CLINIC | Age: 57
End: 2022-05-24
Payer: COMMERCIAL

## 2022-05-24 VITALS
OXYGEN SATURATION: 94 % | BODY MASS INDEX: 50.68 KG/M2 | SYSTOLIC BLOOD PRESSURE: 138 MMHG | HEART RATE: 92 BPM | WEIGHT: 286 LBS | TEMPERATURE: 97.3 F | HEIGHT: 63 IN | DIASTOLIC BLOOD PRESSURE: 83 MMHG

## 2022-05-24 DIAGNOSIS — H61.22 IMPACTED CERUMEN OF LEFT EAR: ICD-10-CM

## 2022-05-24 DIAGNOSIS — J01.80 ACUTE NON-RECURRENT SINUSITIS OF OTHER SINUS: Primary | ICD-10-CM

## 2022-05-24 PROCEDURE — 3017F COLORECTAL CA SCREEN DOC REV: CPT | Performed by: FAMILY MEDICINE

## 2022-05-24 PROCEDURE — 99213 OFFICE O/P EST LOW 20 MIN: CPT | Performed by: FAMILY MEDICINE

## 2022-05-24 PROCEDURE — 1036F TOBACCO NON-USER: CPT | Performed by: FAMILY MEDICINE

## 2022-05-24 PROCEDURE — G8417 CALC BMI ABV UP PARAM F/U: HCPCS | Performed by: FAMILY MEDICINE

## 2022-05-24 PROCEDURE — G8428 CUR MEDS NOT DOCUMENT: HCPCS | Performed by: FAMILY MEDICINE

## 2022-05-24 RX ORDER — CEFDINIR 300 MG/1
300 CAPSULE ORAL 2 TIMES DAILY
Qty: 20 CAPSULE | Refills: 0 | Status: SHIPPED | OUTPATIENT
Start: 2022-05-24 | End: 2022-06-03

## 2022-05-24 RX ORDER — METHYLPREDNISOLONE 4 MG/1
TABLET ORAL
Qty: 1 KIT | Refills: 0 | Status: SHIPPED
Start: 2022-05-24 | End: 2022-06-24 | Stop reason: ALTCHOICE

## 2022-05-24 ASSESSMENT — ENCOUNTER SYMPTOMS
ALLERGIC/IMMUNOLOGIC NEGATIVE: 1
COUGH: 1
EYES NEGATIVE: 1
GASTROINTESTINAL NEGATIVE: 1
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
## 2022-05-25 ENCOUNTER — OFFICE VISIT (OUTPATIENT)
Dept: BARIATRICS/WEIGHT MGMT | Age: 57
End: 2022-05-25
Payer: COMMERCIAL

## 2022-05-25 VITALS
TEMPERATURE: 97.5 F | SYSTOLIC BLOOD PRESSURE: 132 MMHG | HEIGHT: 63 IN | DIASTOLIC BLOOD PRESSURE: 57 MMHG | HEART RATE: 58 BPM | WEIGHT: 286 LBS | BODY MASS INDEX: 50.68 KG/M2

## 2022-05-25 DIAGNOSIS — E66.01 CLASS 3 SEVERE OBESITY DUE TO EXCESS CALORIES WITHOUT SERIOUS COMORBIDITY WITH BODY MASS INDEX (BMI) OF 50.0 TO 59.9 IN ADULT (HCC): ICD-10-CM

## 2022-05-25 DIAGNOSIS — G47.33 SLEEP APNEA, OBSTRUCTIVE: Primary | ICD-10-CM

## 2022-05-25 PROCEDURE — 99213 OFFICE O/P EST LOW 20 MIN: CPT | Performed by: INTERNAL MEDICINE

## 2022-05-25 PROCEDURE — 3017F COLORECTAL CA SCREEN DOC REV: CPT | Performed by: INTERNAL MEDICINE

## 2022-05-25 PROCEDURE — G8428 CUR MEDS NOT DOCUMENT: HCPCS | Performed by: INTERNAL MEDICINE

## 2022-05-25 PROCEDURE — 1036F TOBACCO NON-USER: CPT | Performed by: INTERNAL MEDICINE

## 2022-05-25 PROCEDURE — G8417 CALC BMI ABV UP PARAM F/U: HCPCS | Performed by: INTERNAL MEDICINE

## 2022-05-25 PROCEDURE — 99211 OFF/OP EST MAY X REQ PHY/QHP: CPT

## 2022-05-25 NOTE — PROGRESS NOTES
CC -   LUC, Obesity    BACKGROUND -   Last visit: 03/03/22  First visit: 11/18/21     Obesity   Began in childhood  Initial BMI 61.6, Wt 347.6 lbs, Ht 5' 3\"  HS Grad wt 190 lbs   Lowest   wt  165 lbs age 24  Highest  wt  416 lbs  Pattern of wt gain: grad overall, but with sig losses followed by rapid regain  Wt change past yr: gained 40 lbs, then down 35 lbs  Most wt lost: 94 lbs (food logging + exercise)  Other diets attempted: [employer], SF, Meridia, Fad    Desire to lose wt: 10/10  Prob posed by appetite: 7/10    Initial Diet:    Number of meals per day - 4    Number of snacks per day - 6    Meal volume - 12\" plate, sometimes seconds    Fast food/convenience store - 6-8x/week    Restaurants (not fast food) - 1-2x/week   Sweets - 7d/week (8-10 oreos/day or other chocolate snacks/desserts)   Chips - 5-6d/week (8 oz bag)   Crackers/pretzels - 2d/week (2 cups Cheezits)   Nuts - 0d/week   Peanut Butter - 3d/week (4 Tbsp on bread as a meal)   Popcorn - 2d/week (reg sized MW bag)   Dried fruit - 0d/week   Whole fruit - 5-6d/week (1 serving)   Breakfast cereal - 0-1d/week   Granola/Protein/Energy bar - 0d/week   Sugar sweetened beverages - 40 oz reg soda/d, SB Trenta Refresher Tea 4x/wk (190 each)   Protein - No supplements   Fiber - No supplements     Exercise:    Gym membership - none    Walking - none    Running - none    Resistance - none    Aerobic class - none    ______________________    [facility] -  Past Medical History:   Diagnosis Date    Anxiety     Coronary arteriosclerosis     Degenerative joint disease     involving multiple joints    Depression     Hyperlipidemia     Hypothyroidism     MI, old 0    Obesity     Palpitations     Peptic reflux disease     Sleep apnea      Current Outpatient Medications   Medication Sig Dispense Refill    cefdinir (OMNICEF) 300 MG capsule Take 1 capsule by mouth 2 times daily for 10 days Ok with pcn allergy 20 capsule 0    methylPREDNISolone (MEDROL DOSEPACK) 4 MG tablet Take by mouth. 1 kit 0    metFORMIN (GLUCOPHAGE) 500 MG tablet Take 1 tablet by mouth 2 times daily (with meals) 180 tablet 5    amitriptyline (ELAVIL) 25 MG tablet Take 1 tablet by mouth nightly 90 tablet 5    valsartan (DIOVAN) 80 MG tablet Take 1 tablet by mouth daily 90 tablet 5    pantoprazole (PROTONIX) 40 MG tablet Take 1 tablet by mouth daily 90 tablet 3    levothyroxine (SYNTHROID) 150 MCG tablet Take 1 tablet by mouth daily 90 tablet 5    buPROPion (WELLBUTRIN XL) 150 MG extended release tablet Take 1 tablet by mouth every morning 90 tablet 5    torsemide (DEMADEX) 10 MG tablet Take 1 tablet by mouth daily 90 tablet 5    simvastatin (ZOCOR) 40 MG tablet Take 1 tablet by mouth nightly 90 tablet 12    potassium chloride (KLOR-CON M20) 20 MEQ extended release tablet Take 1 tablet by mouth daily 90 tablet 12    atenolol (TENORMIN) 25 MG tablet Take 1 tablet by mouth 3 times daily 270 tablet 3    venlafaxine (EFFEXOR XR) 37.5 MG extended release capsule Take 1 capsule by mouth daily 90 capsule 2    aspirin 81 MG chewable tablet Take 81 mg by mouth daily        No current facility-administered medications for this visit. PE -  Gen : BP (!) 132/57 (Site: Left Upper Arm, Position: Sitting, Cuff Size: Thigh)   Pulse 58   Temp 97.5 °F (36.4 °C) (Temporal)   Ht 5' 3\" (1.6 m)   Wt 286 lb (129.7 kg)   BMI 50.66 kg/m²    WN, WD, NAD  Heart:  RRR w/o MGR, no Carotid bruits  Lung: Nml resp effort, CTA b/l  Psych: Normal mood   Full affect  Neuro:  Moves all ext well  ______________________    HISTORY & ASSESSMENT/PLAN -     Problem 1 - LUC  HPI  - Diagnosed 2/21/21   Severe   Followed by Dr. Luana David   Wears APAP 7d/wk, full duration of sleep   Daytime sleepiness the past week 2/10 (last visit 3/10)  Assessment  - Controlled with positive pressure support, excess weight is worsening her underlying airway obstruction and impairing her oxygenation  Plan - Wt reduction per the plan below    Problem 2 - Obesity  HPI - See above Background for description   Weight  Date   347.6 lbs 11/18/21 11/30/21  349.0 lbs home, started diet on this day   332.0 lbs 12/30/21  332.0 lbs home   321.0 lbs 01/26/22     308.8 lbs 03/03/22  306.4 lbs home   300.0 lbs 04/12/22  298.6 lbs home   286.0 lbs 05/25/22  284.2 lbs home  Total wt loss to date:  61.6 lbs  DEN = 1900 rachel/d = 13,300 rachel/wk  Avg daily energy variance:   11/30/21 - 12/30/21 = - 15.4 lbs (53,900 rachel)/30d = - 1,800 rachel/d deficit   12/30/21 - 01/26/22 = - 11.0 lbs (38,500 rachel)/27d = - 1,426 rachel/d deficit   01/26/22 - 03/03/22 = - 12.4 lbs (43,400 rachel)/36d = - 1,205 rachel/d deficit   03/03/22 - 04/12/22 = -   7.8 lbs (27,300 rachel)/40d = -   682 rachel/d deficit   04/12/22 - 05/25/22 = -  14.4 lbs (50,400 rachel)43d = - 1,172 rachel/d deficit  Cumulative energy variance = - 46.6 lbs (163,100 rachel)/133d = 1,226 rachel/d deficit (given a 2902-0743 rachel/d intake, this suggest a DEN of 2700 rachel); however for this past period it would be 682 + 1475 = 2157 rachel/d  Wt effect of HR foods = Restaurant food 1275 rachel/w + Sweets 3,465 + Chips/Crackers/PC 8,240 + SSB 4,120 = 17,100 rachel/wk = 2,442 rachel/d = 128% DEN = 244 lbs/yr  The above numbers for HR foods are inaccurately high. However, they still serve to show that these foods occupy an inordinate percentage of her daily energy needs. Reduction of them alone, therefore, may be sufficient to produce an acceptable rate of weight loss. She states that she struggles with emotional eating. Her depression is uncontrolled at the present time. Since she also has anxiety, venlafaxine should be added to bupropion. (The dose of amitriptyline is small and therefore the increased risk of serotonin syndrome is very small; I discussed this with her.) My hope is that she will experience some appetite suppression from the venlafaxine. She has a strong family history of early heart disease and some issues in the past with ectopy.  Phentermine is therefore not a good choice for her. She preferred venlafaxine over topiramate b/c of the side effect profile (also she is taking a loop diurectic)  Did not tolerate venlafaxine due to poor overall sense of well being. However, would like to try it again. No other options are available other than topiramate. Retried it at her request on 12/30/21 and she was able to tolerate it  Update:   Calorie monitoring - counting 7d/wk, avg calorie intake 8046-0799 rachel/day (closer to 1500) (uses My Fitness Pal)  Sweets - 1d/mo  Salty snacks - <100 rachel/day, 2d/wk  Restaurant food - 2x/mo  SSB - none (states she still struggles with this)  Fiber - 28 grams/day (1/2 cup Fiber One)  Exercise - walking 1mi, 2d/wk (limited by knee pain)  Protein intake - avg 75 grams (uses a food logging camila that includes macronutrients)  Taking Venlafaxine 37.5 mg, one tablet every day, 7/10 appetite suppression, Side effects -  + elevation of mood, + dry mouth (but this may be due to her APAP)  Water 70-75 oz daily (96 oz caused nocturia)  Assessment  - Improved; cont same plan and venlafaxine  Plan   -   Patient Instructions   Count every calorie every day    Limit calories to 1500 rachel/day    Limit sweets to one day per month    Limit salty snacks to 100 rachel/day    Do not drink any sugar sweetened beverages    Limit restaurant food to once every two weeks    Protein intake should be 65 grams/day    Fiber intake should be 22 grams/day (work up to this amount slowly; use wheat dextrin)    Take venlafaxine ER 37.5 mg one tablet every day.      Make sure that water intake is >75 oz/day    Follow up  5-7 weeks      Brittny Shetty MD  Endocrinology/Obesity  5/25/22

## 2022-06-20 DIAGNOSIS — I10 ESSENTIAL HYPERTENSION: ICD-10-CM

## 2022-06-20 RX ORDER — ATENOLOL 25 MG/1
TABLET ORAL
Qty: 270 TABLET | Refills: 3 | Status: SHIPPED
Start: 2022-06-20 | End: 2022-08-10

## 2022-06-29 ENCOUNTER — OFFICE VISIT (OUTPATIENT)
Dept: BARIATRICS/WEIGHT MGMT | Age: 57
End: 2022-06-29
Payer: COMMERCIAL

## 2022-06-29 VITALS
HEIGHT: 63 IN | TEMPERATURE: 98.1 F | DIASTOLIC BLOOD PRESSURE: 76 MMHG | HEART RATE: 72 BPM | WEIGHT: 274.2 LBS | SYSTOLIC BLOOD PRESSURE: 130 MMHG | BODY MASS INDEX: 48.58 KG/M2

## 2022-06-29 DIAGNOSIS — G47.33 SLEEP APNEA, OBSTRUCTIVE: Primary | ICD-10-CM

## 2022-06-29 DIAGNOSIS — E66.01 CLASS 3 SEVERE OBESITY DUE TO EXCESS CALORIES WITHOUT SERIOUS COMORBIDITY WITH BODY MASS INDEX (BMI) OF 50.0 TO 59.9 IN ADULT (HCC): ICD-10-CM

## 2022-06-29 PROCEDURE — 1036F TOBACCO NON-USER: CPT | Performed by: INTERNAL MEDICINE

## 2022-06-29 PROCEDURE — 3017F COLORECTAL CA SCREEN DOC REV: CPT | Performed by: INTERNAL MEDICINE

## 2022-06-29 PROCEDURE — 99211 OFF/OP EST MAY X REQ PHY/QHP: CPT

## 2022-06-29 PROCEDURE — G8428 CUR MEDS NOT DOCUMENT: HCPCS | Performed by: INTERNAL MEDICINE

## 2022-06-29 PROCEDURE — 99213 OFFICE O/P EST LOW 20 MIN: CPT | Performed by: INTERNAL MEDICINE

## 2022-06-29 PROCEDURE — G8417 CALC BMI ABV UP PARAM F/U: HCPCS | Performed by: INTERNAL MEDICINE

## 2022-06-29 NOTE — PROGRESS NOTES
CC -   LUC, Obesity    BACKGROUND -   Last visit: 05/25/22  First visit: 11/18/21     Obesity   Began in childhood  Initial BMI 61.6, Wt 347.6 lbs, Ht 5' 3\"  HS Grad wt 190 lbs   Lowest   wt  165 lbs age 24  Highest  wt  416 lbs  Pattern of wt gain: grad overall, but with sig losses followed by rapid regain  Wt change past yr: gained 40 lbs, then down 35 lbs  Most wt lost: 94 lbs (food logging + exercise)  Other diets attempted: [employer], SF, Meridia, Fad    Desire to lose wt: 10/10  Prob posed by appetite: 7/10    Initial Diet:    Number of meals per day - 4    Number of snacks per day - 6    Meal volume - 12\" plate, sometimes seconds    Fast food/convenience store - 6-8x/week    Restaurants (not fast food) - 1-2x/week   Sweets - 7d/week (8-10 oreos/day or other chocolate snacks/desserts)   Chips - 5-6d/week (8 oz bag)   Crackers/pretzels - 2d/week (2 cups Cheezits)   Nuts - 0d/week   Peanut Butter - 3d/week (4 Tbsp on bread as a meal)   Popcorn - 2d/week (reg sized MW bag)   Dried fruit - 0d/week   Whole fruit - 5-6d/week (1 serving)   Breakfast cereal - 0-1d/week   Granola/Protein/Energy bar - 0d/week   Sugar sweetened beverages - 40 oz reg soda/d, SB Trenta Refresher Tea 4x/wk (190 each)   Protein - No supplements   Fiber - No supplements     Exercise:    Gym membership - none    Walking - none    Running - none    Resistance - none    Aerobic class - none    ______________________    [facility] -  Past Medical History:   Diagnosis Date    Anxiety     Coronary arteriosclerosis     Degenerative joint disease     involving multiple joints    Depression     Hyperlipidemia     Hypothyroidism     MI, old 0    Obesity     Palpitations     Peptic reflux disease     Sleep apnea      Current Outpatient Medications   Medication Sig Dispense Refill    atenolol (TENORMIN) 25 MG tablet TAKE 1 TABLET BY MOUTH THREE TIMES A  tablet 3    metFORMIN (GLUCOPHAGE) 500 MG tablet Take 1 tablet by mouth 2 times daily (with meals) 180 tablet 5    amitriptyline (ELAVIL) 25 MG tablet Take 1 tablet by mouth nightly 90 tablet 5    valsartan (DIOVAN) 80 MG tablet Take 1 tablet by mouth daily 90 tablet 5    pantoprazole (PROTONIX) 40 MG tablet Take 1 tablet by mouth daily 90 tablet 3    levothyroxine (SYNTHROID) 150 MCG tablet Take 1 tablet by mouth daily 90 tablet 5    buPROPion (WELLBUTRIN XL) 150 MG extended release tablet Take 1 tablet by mouth every morning 90 tablet 5    torsemide (DEMADEX) 10 MG tablet Take 1 tablet by mouth daily 90 tablet 5    simvastatin (ZOCOR) 40 MG tablet Take 1 tablet by mouth nightly 90 tablet 12    potassium chloride (KLOR-CON M20) 20 MEQ extended release tablet Take 1 tablet by mouth daily 90 tablet 12    venlafaxine (EFFEXOR XR) 37.5 MG extended release capsule Take 1 capsule by mouth daily 90 capsule 2    aspirin 81 MG chewable tablet Take 81 mg by mouth daily        No current facility-administered medications for this visit. PE -  Gen : /76 (Site: Left Upper Arm, Position: Sitting, Cuff Size: Large Adult)   Pulse 72   Temp 98.1 °F (36.7 °C)   Ht 5' 3\" (1.6 m)   Wt 274 lb 3.2 oz (124.4 kg)   BMI 48.57 kg/m²    WN, WD, NAD  Heart:  RRR w/o MGR, no Carotid bruits  Lung: Nml resp effort, CTA b/l  Psych: Normal mood   Full affect  Neuro:  Moves all ext well  ______________________    HISTORY & ASSESSMENT/PLAN -     Problem 1 - LUC  HPI  - Diagnosed 2/21/21   Severe   Followed by Dr. Rob Christianson   Wears APAP 7d/wk, full duration of sleep   Daytime sleepiness the past week 4/10 (increased b/c of requiring her mask to be re-sized due to her wt loss)  Assessment  - Controlled with positive pressure support, excess weight is worsening her underlying airway obstruction and impairing her oxygenation  Plan - Wt reduction per the plan below    Problem 2 - Obesity  HPI - See above Background for description   Weight  Date   347.6 lbs 11/18/21 (had lost 35 lbs prior to seeing me)     11/30/21  349.0 lbs home, started diet on this day   332.0 lbs 12/30/21  332.0 lbs home   321.0 lbs 01/26/22     308.8 lbs 03/03/22  306.4 lbs home   300.0 lbs 04/12/22  298.6 lbs home   286.0 lbs 05/25/22  284.2 lbs home (diagnosed with Covid 5/26/22)   274.2 lbs 06/29/22  273.4 lbs home  Total wt loss to date:  73.4 lbs  DEN = 1900 rachel/d = 13,300 rachel/wk  Avg daily energy variance:   11/30/21 - 12/30/21 = - 15.4 lbs (53,900 rachel)/30d = - 1,800 rachel/d deficit   12/30/21 - 01/26/22 = - 11.0 lbs (38,500 rachel)/27d = - 1,426 rachel/d deficit   01/26/22 - 03/03/22 = - 12.4 lbs (43,400 rachel)/36d = - 1,205 rachel/d deficit   03/03/22 - 04/12/22 = -   7.8 lbs (27,300 rachel)/40d = -   682 rachel/d deficit   04/12/22 - 05/25/22 = -  14.4 lbs (50,400 rachel)/43d = - 1,172 rachel/d deficit   05/25/22 - 06/29/22 = -  11.8 lbs (41,300 rachel)/35d = - 1,180 rachel/d deficit  Cumulative energy variance = - 46.6 lbs (163,100 rachel)/133d = 1,226 rachel/d deficit (given a 2808-7618 rachel/d intake, this suggest a DEN of 2700 rachel); however for this past period it would be 682 + 1475 = 2157 rachel/d  Wt effect of HR foods = Restaurant food 1275 rachel/w + Sweets 3,465 + Chips/Crackers/PC 8,240 + SSB 4,120 = 17,100 rachel/wk = 2,442 rachel/d = 128% DEN = 244 lbs/yr  The above numbers for HR foods are inaccurately high. However, they still serve to show that these foods occupy an inordinate percentage of her daily energy needs. Reduction of them alone, therefore, may be sufficient to produce an acceptable rate of weight loss. She states that she struggles with emotional eating. Her depression is uncontrolled at the present time. Since she also has anxiety, venlafaxine should be added to bupropion. (The dose of amitriptyline is small and therefore the increased risk of serotonin syndrome is very small; I discussed this with her.) My hope is that she will experience some appetite suppression from the venlafaxine.   She has a strong family history of early heart disease and some issues in the past with ectopy. Phentermine is therefore not a good choice for her. She preferred venlafaxine over topiramate b/c of the side effect profile (also she is taking a loop diurectic)  Did not tolerate venlafaxine due to poor overall sense of well being. However, would like to try it again. No other options are available other than topiramate. Retried it at her request on 12/30/21 and she was able to tolerate it  Update:   Calorie monitoring - counting 7d/wk, avg calorie intake 1400 rachel/day(uses My Fitness Pal)  Sweets - 1d/mo  Salt-type snacks - <100 rachel/day, 3d/wk  Restaurant food - 0x/mo  SSB - none  Fiber - 28 grams/day (1/2 cup Fiber One)  Exercise - walking 1mi, 1d/wk (limited by knee pain), does pool walking 15 min, 4-5 d/wk  Protein intake - avg 70-75 grams (uses a food logging camila that includes macronutrients)  Appetite suppressant -  Venlafaxine 37.5 mg, one tablet every day, 7/10 appetite suppression, Side effects -  + elevation of mood, + dry mouth (but this may be due to her APAP)  Water 70-75 oz daily (96 oz caused nocturia)  Assessment  - Improved; cont same plan and venlafaxine  Plan   -   Patient Instructions   Count every calorie every day    Limit calories to 1500 rachel/day    Limit sweets to one day per month    Limit salty snacks to 100 rachel/day    Do not drink any sugar sweetened beverages    Limit restaurant food to once every two weeks    Protein intake should be 65 grams/day    Fiber intake should be 22 grams/day (work up to this amount slowly; use wheat dextrin)    Take venlafaxine ER 37.5 mg one tablet every day.      Make sure that water intake is >75 oz/day    Follow up  5-7 weeks      Krishna Varma MD  Endocrinology/Obesity  6/29/22

## 2022-08-03 ENCOUNTER — OFFICE VISIT (OUTPATIENT)
Dept: BARIATRICS/WEIGHT MGMT | Age: 57
End: 2022-08-03
Payer: COMMERCIAL

## 2022-08-03 VITALS
TEMPERATURE: 97.5 F | HEART RATE: 65 BPM | HEIGHT: 63 IN | WEIGHT: 265.2 LBS | SYSTOLIC BLOOD PRESSURE: 128 MMHG | BODY MASS INDEX: 46.99 KG/M2 | DIASTOLIC BLOOD PRESSURE: 59 MMHG

## 2022-08-03 DIAGNOSIS — E66.01 CLASS 3 SEVERE OBESITY DUE TO EXCESS CALORIES WITHOUT SERIOUS COMORBIDITY WITH BODY MASS INDEX (BMI) OF 50.0 TO 59.9 IN ADULT (HCC): ICD-10-CM

## 2022-08-03 DIAGNOSIS — G47.33 SLEEP APNEA, OBSTRUCTIVE: Primary | ICD-10-CM

## 2022-08-03 PROCEDURE — 1036F TOBACCO NON-USER: CPT | Performed by: INTERNAL MEDICINE

## 2022-08-03 PROCEDURE — G8428 CUR MEDS NOT DOCUMENT: HCPCS | Performed by: INTERNAL MEDICINE

## 2022-08-03 PROCEDURE — G8417 CALC BMI ABV UP PARAM F/U: HCPCS | Performed by: INTERNAL MEDICINE

## 2022-08-03 PROCEDURE — 99211 OFF/OP EST MAY X REQ PHY/QHP: CPT

## 2022-08-03 PROCEDURE — 3017F COLORECTAL CA SCREEN DOC REV: CPT | Performed by: INTERNAL MEDICINE

## 2022-08-03 PROCEDURE — 99214 OFFICE O/P EST MOD 30 MIN: CPT | Performed by: INTERNAL MEDICINE

## 2022-08-03 NOTE — PROGRESS NOTES
CC -   LUC, Obesity    BACKGROUND -   Last visit: 06/29/22  First visit: 11/18/21    Obesity   Began in childhood  Initial BMI 61.6, Wt 347.6 lbs, Ht 5' 3\"  HS Grad wt 190 lbs   Lowest   wt  165 lbs age 24  Highest  wt  416 lbs  Pattern of wt gain: grad overall, but with sig losses followed by rapid regain  Wt change past yr: gained 40 lbs, then down 35 lbs  Most wt lost: 94 lbs (food logging + exercise)  Other diets attempted: [employer], SF, Meridia, Fad    Desire to lose wt: 10/10  Prob posed by appetite: 7/10    Initial Diet:    Number of meals per day - 4    Number of snacks per day - 6    Meal volume - 12\" plate, sometimes seconds    Fast food/convenience store - 6-8x/week    Restaurants (not fast food) - 1-2x/week   Sweets - 7d/week (8-10 oreos/day or other chocolate snacks/desserts)   Chips - 5-6d/week (8 oz bag)   Crackers/pretzels - 2d/week (2 cups Cheezits)   Nuts - 0d/week   Peanut Butter - 3d/week (4 Tbsp on bread as a meal)   Popcorn - 2d/week (reg sized MW bag)   Dried fruit - 0d/week   Whole fruit - 5-6d/week (1 serving)   Breakfast cereal - 0-1d/week   Granola/Protein/Energy bar - 0d/week   Sugar sweetened beverages - 40 oz reg soda/d, SB Trenta Refresher Tea 4x/wk (190 each)   Protein - No supplements   Fiber - No supplements     Exercise:    Gym membership - none    Walking - none    Running - none    Resistance - none    Aerobic class - none    ______________________    [facility] -  Past Medical History:   Diagnosis Date    Anxiety     Coronary arteriosclerosis     Degenerative joint disease     involving multiple joints    Depression     Hyperlipidemia     Hypothyroidism     MI, old 1998    Obesity     Palpitations     Peptic reflux disease     Sleep apnea      Current Outpatient Medications   Medication Sig Dispense Refill    atenolol (TENORMIN) 25 MG tablet TAKE 1 TABLET BY MOUTH THREE TIMES A  tablet 3    metFORMIN (GLUCOPHAGE) 500 MG tablet Take 1 tablet by mouth 2 times daily (with meals) 180 tablet 5    amitriptyline (ELAVIL) 25 MG tablet Take 1 tablet by mouth nightly 90 tablet 5    valsartan (DIOVAN) 80 MG tablet Take 1 tablet by mouth daily 90 tablet 5    pantoprazole (PROTONIX) 40 MG tablet Take 1 tablet by mouth daily 90 tablet 3    levothyroxine (SYNTHROID) 150 MCG tablet Take 1 tablet by mouth daily 90 tablet 5    buPROPion (WELLBUTRIN XL) 150 MG extended release tablet Take 1 tablet by mouth every morning 90 tablet 5    torsemide (DEMADEX) 10 MG tablet Take 1 tablet by mouth daily 90 tablet 5    simvastatin (ZOCOR) 40 MG tablet Take 1 tablet by mouth nightly 90 tablet 12    potassium chloride (KLOR-CON M20) 20 MEQ extended release tablet Take 1 tablet by mouth daily 90 tablet 12    venlafaxine (EFFEXOR XR) 37.5 MG extended release capsule Take 1 capsule by mouth daily 90 capsule 2    aspirin 81 MG chewable tablet Take 81 mg by mouth daily        No current facility-administered medications for this visit. PE -  Gen : BP (!) 128/59 (Site: Left Upper Arm, Position: Sitting, Cuff Size: Thigh)   Pulse 65   Temp 97.5 °F (36.4 °C) (Temporal)   Ht 5' 3\" (1.6 m)   Wt 265 lb 3.2 oz (120.3 kg)   BMI 46.98 kg/m²    WN, WD, NAD  Lung: Nml resp effort  Psych: Normal mood   Full affect  Neuro:  Moves all ext well  ______________________    HISTORY & ASSESSMENT/PLAN -     Problem 1 - LUC  HPI  - Diagnosed 2/21/21   Severe   Followed by Dr. Marcela Elias   Wears APAP 7d/wk, full duration of sleep   Daytime sleepiness the past week 3/10 (improved after having her mask resized due to her weight loss)   Uses Somnifix mouth tape to keep her mouth closed at night  Assessment  - Controlled with positive pressure support, excess weight is worsening her underlying airway obstruction and impairing her oxygenation  Plan - Wt reduction per the plan below    Problem 2 - Obesity  HPI - See above Background for description   Weight  Date   347.6 lbs 11/18/21 (had lost 35 lbs prior to seeing me)     11/30/21  349.0 lbs home, started diet on this day   332.0 lbs 12/30/21  332.0 lbs home   321.0 lbs 01/26/22     308.8 lbs 03/03/22  306.4 lbs home   300.0 lbs 04/12/22  298.6 lbs home   286.0 lbs 05/25/22  284.2 lbs home (diagnosed with Covid 5/26/22)   274.2 lbs 06/29/22  273.4 lbs home   265.2 lbs 08/03/22  264.1 lbs home  Total wt loss to date:  82.4 lbs  DEN = 1900 rachel/d = 13,300 rachel/wk  Avg daily energy variance:   11/30/21 - 12/30/21 = - 15.4 lbs/30d = - 1,800 rachel/d deficit   12/30/21 - 01/26/22 = - 11.0 lbs /27d = - 1,426 rachel/d deficit   01/26/22 - 03/03/22 = - 12.4 lbs /36d = - 1,205 rachel/d deficit   03/03/22 - 04/12/22 = -   7.8 lbs/40d = -   682 rachel/d deficit   04/12/22 - 05/25/22 = -  14.4 lbs/43d = - 1,172 rachel/d deficit   05/25/22 - 06/29/22 = -  11.8 lbs/35d = - 1,180 rachel/d deficit   06/29/22 - 08/03/22 = -    9.3 lbs/35d = -    930 rachel/d deficit    Wt effect of HR foods = Restaurant food 1275 rachel/w + Sweets 3,465 + Chips/Crackers/PC 8,240 + SSB 4,120 = 17,100 rachel/wk = 2,442 rachel/d = 128% DEN = 244 lbs/yr  The above numbers for HR foods are inaccurately high. However, they still serve to show that these foods occupy an inordinate percentage of her daily energy needs. Reduction of them alone, therefore, may be sufficient to produce an acceptable rate of weight loss. She states that she struggles with emotional eating. Her depression is uncontrolled at the present time. Since she also has anxiety, venlafaxine should be added to bupropion. (The dose of amitriptyline is small and therefore the increased risk of serotonin syndrome is very small; I discussed this with her.) My hope is that she will experience some appetite suppression from the venlafaxine. She has a strong family history of early heart disease and some issues in the past with ectopy. Phentermine is therefore not a good choice for her.   She preferred venlafaxine over topiramate b/c of the side effect profile (also she is taking a loop diurectic)  Did not tolerate venlafaxine due to poor overall sense of well being. However, would like to try it again. No other options are available other than topiramate. Retried it at her request on 12/30/21 and she was able to tolerate it  Update:   Calorie monitoring - counting 7d/wk, avg calorie intake 1400 rachel/day (uses My Fitness Pal)  Sweets - 1d/mo  Salt-type snacks - <100 rachel/day, 1-2d/wk  Restaurant food - 0x/mo  SSB - none  Fiber - 28 grams/day (includes 1/2 cup Fiber One)  Exercise - walking 1mi, 1d/wk (limited by knee pain), does pool walking 15 min, 4-5 d/wk  Protein intake - avg 70-75 grams (uses a food logging camila that includes macronutrients)  Appetite suppressant -  Venlafaxine 37.5 mg, one tablet every day, 6-7/10 appetite suppression, Side effects -  + elevation of mood, + dry mouth (improved with mouth tape)  Does not want to increase the dose at this time  Water 70-75 oz daily (96 oz caused nocturia)  Assessment  - Improved; cont same plan and venlafaxine  Plan   -   Patient Instructions   Count every calorie every day    Limit calories to 1500 rachel/day    Limit sweets to one day per month    Limit salty snacks to 100 rachel/day    Do not drink any sugar sweetened beverages    Limit restaurant food to once every two weeks    Protein intake should be 65 grams/day    Fiber intake should be 22 grams/day (work up to this amount slowly; use wheat dextrin)    Take venlafaxine ER 37.5 mg one tablet every day.      Make sure that water intake is >75 oz/day    Follow up  5-7 weeks    Total time spent on the encounter: 36 min    Taniya Liriano MD  Endocrinology/Obesity  8/3/22

## 2022-08-09 DIAGNOSIS — I10 ESSENTIAL HYPERTENSION: ICD-10-CM

## 2022-08-09 DIAGNOSIS — I10 ESSENTIAL HYPERTENSION: Chronic | ICD-10-CM

## 2022-08-09 DIAGNOSIS — E66.01 CLASS 3 SEVERE OBESITY DUE TO EXCESS CALORIES WITHOUT SERIOUS COMORBIDITY WITH BODY MASS INDEX (BMI) OF 60.0 TO 69.9 IN ADULT (HCC): ICD-10-CM

## 2022-08-10 RX ORDER — POTASSIUM CHLORIDE 20 MEQ/1
20 TABLET, EXTENDED RELEASE ORAL DAILY
Qty: 90 TABLET | Refills: 12 | Status: SHIPPED | OUTPATIENT
Start: 2022-08-10

## 2022-08-10 RX ORDER — ATENOLOL 25 MG/1
TABLET ORAL
Qty: 270 TABLET | Refills: 3 | OUTPATIENT
Start: 2022-08-10

## 2022-08-10 RX ORDER — ATENOLOL 25 MG/1
TABLET ORAL
Qty: 270 TABLET | Refills: 1 | Status: SHIPPED | OUTPATIENT
Start: 2022-08-10

## 2022-08-10 RX ORDER — TORSEMIDE 10 MG/1
10 TABLET ORAL DAILY
Qty: 90 TABLET | Refills: 5 | Status: SHIPPED | OUTPATIENT
Start: 2022-08-10

## 2022-08-11 RX ORDER — VENLAFAXINE HYDROCHLORIDE 37.5 MG/1
37.5 CAPSULE, EXTENDED RELEASE ORAL DAILY
Qty: 90 CAPSULE | Refills: 2 | OUTPATIENT
Start: 2022-08-11

## 2022-08-13 RX ORDER — VENLAFAXINE HYDROCHLORIDE 37.5 MG/1
CAPSULE, EXTENDED RELEASE ORAL
Qty: 90 CAPSULE | Refills: 2 | Status: SHIPPED | OUTPATIENT
Start: 2022-08-13

## 2022-09-14 ENCOUNTER — TELEPHONE (OUTPATIENT)
Dept: HEMATOLOGY | Age: 57
End: 2022-09-14

## 2022-09-14 DIAGNOSIS — K76.0 NAFLD (NONALCOHOLIC FATTY LIVER DISEASE): Primary | ICD-10-CM

## 2022-09-14 NOTE — TELEPHONE ENCOUNTER
M for Luciana Samuels to return call to clinic regarding her 1  yr CT scan being scheduled. [facility] 10/12/22 Arrive 9am, Scan 930am, NPO 4 hours. Luciana Samuels will need to complete labs prior to scan. BMP ordered and in Epic. A follow up appt will need to be schduled with Dr Ny Masters.

## 2022-09-22 NOTE — TELEPHONE ENCOUNTER
Spoke To Roxann Sharpe who stated she needed different dates and location for her CT scan. Radiology scheduling number was provided. Encouraged pt to call office to schedule f/u w Dr Moira Palomo. Rx for Prednisone 50 mg tablets called in to [facility]. Dispense 3 tablets, 0 refills. Take 1 tablet PO at 13 hrs, 7 hrs, and 1 hr before contrast, plus diphenhydramine.

## 2022-09-28 DIAGNOSIS — Z00.01 ENCOUNTER FOR ANNUAL GENERAL MEDICAL EXAMINATION WITH ABNORMAL FINDINGS IN ADULT: ICD-10-CM

## 2022-09-28 DIAGNOSIS — E11.9 TYPE 2 DIABETES MELLITUS WITHOUT COMPLICATION, WITHOUT LONG-TERM CURRENT USE OF INSULIN (HCC): ICD-10-CM

## 2022-09-28 DIAGNOSIS — E03.9 ACQUIRED HYPOTHYROIDISM: Chronic | ICD-10-CM

## 2022-09-28 DIAGNOSIS — I25.10 CORONARY ARTERY DISEASE INVOLVING NATIVE CORONARY ARTERY OF NATIVE HEART WITHOUT ANGINA PECTORIS: Chronic | ICD-10-CM

## 2022-09-28 LAB
ALBUMIN SERPL-MCNC: 4.5 G/DL (ref 3.5–5.2)
ALP BLD-CCNC: 70 U/L (ref 35–104)
ALT SERPL-CCNC: 10 U/L (ref 0–32)
ANION GAP SERPL CALCULATED.3IONS-SCNC: 14 MMOL/L (ref 7–16)
AST SERPL-CCNC: 14 U/L (ref 0–31)
BASOPHILS ABSOLUTE: 0.04 E9/L (ref 0–0.2)
BASOPHILS RELATIVE PERCENT: 0.7 % (ref 0–2)
BILIRUB SERPL-MCNC: 0.7 MG/DL (ref 0–1.2)
BUN BLDV-MCNC: 13 MG/DL (ref 6–20)
CALCIUM SERPL-MCNC: 10.1 MG/DL (ref 8.6–10.2)
CHLORIDE BLD-SCNC: 99 MMOL/L (ref 98–107)
CHOLESTEROL, TOTAL: 171 MG/DL (ref 0–199)
CO2: 26 MMOL/L (ref 22–29)
CREAT SERPL-MCNC: 0.9 MG/DL (ref 0.5–1)
EOSINOPHILS ABSOLUTE: 0.04 E9/L (ref 0.05–0.5)
EOSINOPHILS RELATIVE PERCENT: 0.7 % (ref 0–6)
GFR AFRICAN AMERICAN: >60
GFR NON-AFRICAN AMERICAN: >60 ML/MIN/1.73
GLUCOSE BLD-MCNC: 90 MG/DL (ref 74–99)
HBA1C MFR BLD: 5.2 % (ref 4–5.6)
HCT VFR BLD CALC: 39.7 % (ref 34–48)
HDLC SERPL-MCNC: 58 MG/DL
HEMOGLOBIN: 12.2 G/DL (ref 11.5–15.5)
IMMATURE GRANULOCYTES #: 0.01 E9/L
IMMATURE GRANULOCYTES %: 0.2 % (ref 0–5)
LDL CHOLESTEROL CALCULATED: 78 MG/DL (ref 0–99)
LYMPHOCYTES ABSOLUTE: 1.29 E9/L (ref 1.5–4)
LYMPHOCYTES RELATIVE PERCENT: 23 % (ref 20–42)
MCH RBC QN AUTO: 22.9 PG (ref 26–35)
MCHC RBC AUTO-ENTMCNC: 30.7 % (ref 32–34.5)
MCV RBC AUTO: 74.6 FL (ref 80–99.9)
MONOCYTES ABSOLUTE: 0.36 E9/L (ref 0.1–0.95)
MONOCYTES RELATIVE PERCENT: 6.4 % (ref 2–12)
NEUTROPHILS ABSOLUTE: 3.87 E9/L (ref 1.8–7.3)
NEUTROPHILS RELATIVE PERCENT: 69 % (ref 43–80)
PDW BLD-RTO: 15.5 FL (ref 11.5–15)
PLATELET # BLD: 203 E9/L (ref 130–450)
PMV BLD AUTO: 10.3 FL (ref 7–12)
POTASSIUM SERPL-SCNC: 4.1 MMOL/L (ref 3.5–5)
RBC # BLD: 5.32 E12/L (ref 3.5–5.5)
SODIUM BLD-SCNC: 139 MMOL/L (ref 132–146)
T4 TOTAL: 12.5 MCG/DL (ref 4.5–11.7)
TOTAL PROTEIN: 7.1 G/DL (ref 6.4–8.3)
TRIGL SERPL-MCNC: 175 MG/DL (ref 0–149)
TSH SERPL DL<=0.05 MIU/L-ACNC: 0.42 UIU/ML (ref 0.27–4.2)
VLDLC SERPL CALC-MCNC: 35 MG/DL
WBC # BLD: 5.6 E9/L (ref 4.5–11.5)

## 2022-09-30 ENCOUNTER — OFFICE VISIT (OUTPATIENT)
Dept: PRIMARY CARE CLINIC | Age: 57
End: 2022-09-30
Payer: COMMERCIAL

## 2022-09-30 VITALS
HEIGHT: 63 IN | DIASTOLIC BLOOD PRESSURE: 78 MMHG | BODY MASS INDEX: 46.67 KG/M2 | TEMPERATURE: 97.9 F | SYSTOLIC BLOOD PRESSURE: 128 MMHG | WEIGHT: 263.4 LBS

## 2022-09-30 DIAGNOSIS — I25.10 CORONARY ARTERIOSCLEROSIS: Primary | ICD-10-CM

## 2022-09-30 DIAGNOSIS — G47.33 SLEEP APNEA, OBSTRUCTIVE: Chronic | ICD-10-CM

## 2022-09-30 DIAGNOSIS — E78.2 MIXED HYPERLIPIDEMIA: Chronic | ICD-10-CM

## 2022-09-30 DIAGNOSIS — I10 ESSENTIAL HYPERTENSION: Chronic | ICD-10-CM

## 2022-09-30 DIAGNOSIS — E03.9 ACQUIRED HYPOTHYROIDISM: ICD-10-CM

## 2022-09-30 PROBLEM — E11.9 TYPE 2 DIABETES MELLITUS WITHOUT COMPLICATION, WITHOUT LONG-TERM CURRENT USE OF INSULIN (HCC): Status: RESOLVED | Noted: 2022-02-17 | Resolved: 2022-09-30

## 2022-09-30 PROCEDURE — G8417 CALC BMI ABV UP PARAM F/U: HCPCS | Performed by: FAMILY MEDICINE

## 2022-09-30 PROCEDURE — G8428 CUR MEDS NOT DOCUMENT: HCPCS | Performed by: FAMILY MEDICINE

## 2022-09-30 PROCEDURE — 99214 OFFICE O/P EST MOD 30 MIN: CPT | Performed by: FAMILY MEDICINE

## 2022-09-30 PROCEDURE — 3017F COLORECTAL CA SCREEN DOC REV: CPT | Performed by: FAMILY MEDICINE

## 2022-09-30 PROCEDURE — 1036F TOBACCO NON-USER: CPT | Performed by: FAMILY MEDICINE

## 2022-09-30 ASSESSMENT — PATIENT HEALTH QUESTIONNAIRE - PHQ9
1. LITTLE INTEREST OR PLEASURE IN DOING THINGS: 0
SUM OF ALL RESPONSES TO PHQ QUESTIONS 1-9: 0
SUM OF ALL RESPONSES TO PHQ9 QUESTIONS 1 & 2: 0
2. FEELING DOWN, DEPRESSED OR HOPELESS: 0

## 2022-09-30 ASSESSMENT — ENCOUNTER SYMPTOMS
ALLERGIC/IMMUNOLOGIC NEGATIVE: 1
GASTROINTESTINAL NEGATIVE: 1
EYES NEGATIVE: 1
RESPIRATORY NEGATIVE: 1
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
## 2022-10-11 ENCOUNTER — OFFICE VISIT (OUTPATIENT)
Dept: BARIATRICS/WEIGHT MGMT | Age: 57
End: 2022-10-11
Payer: COMMERCIAL

## 2022-10-11 VITALS
TEMPERATURE: 97.6 F | WEIGHT: 259.4 LBS | SYSTOLIC BLOOD PRESSURE: 126 MMHG | BODY MASS INDEX: 45.96 KG/M2 | HEIGHT: 63 IN | HEART RATE: 70 BPM | DIASTOLIC BLOOD PRESSURE: 79 MMHG

## 2022-10-11 DIAGNOSIS — E27.8 LEFT ADRENAL MASS (HCC): ICD-10-CM

## 2022-10-11 DIAGNOSIS — E66.01 CLASS 3 SEVERE OBESITY DUE TO EXCESS CALORIES WITHOUT SERIOUS COMORBIDITY WITH BODY MASS INDEX (BMI) OF 60.0 TO 69.9 IN ADULT (HCC): ICD-10-CM

## 2022-10-11 DIAGNOSIS — K76.0 NAFLD (NONALCOHOLIC FATTY LIVER DISEASE): Primary | ICD-10-CM

## 2022-10-11 DIAGNOSIS — R16.0 HEPATOMEGALY: ICD-10-CM

## 2022-10-11 DIAGNOSIS — B37.9 CANDIDIASIS: ICD-10-CM

## 2022-10-11 DIAGNOSIS — G47.33 SLEEP APNEA, OBSTRUCTIVE: Primary | ICD-10-CM

## 2022-10-11 PROCEDURE — G8417 CALC BMI ABV UP PARAM F/U: HCPCS | Performed by: INTERNAL MEDICINE

## 2022-10-11 PROCEDURE — 99214 OFFICE O/P EST MOD 30 MIN: CPT | Performed by: INTERNAL MEDICINE

## 2022-10-11 PROCEDURE — 1036F TOBACCO NON-USER: CPT | Performed by: INTERNAL MEDICINE

## 2022-10-11 PROCEDURE — G8484 FLU IMMUNIZE NO ADMIN: HCPCS | Performed by: INTERNAL MEDICINE

## 2022-10-11 PROCEDURE — 3017F COLORECTAL CA SCREEN DOC REV: CPT | Performed by: INTERNAL MEDICINE

## 2022-10-11 PROCEDURE — 99211 OFF/OP EST MAY X REQ PHY/QHP: CPT

## 2022-10-11 PROCEDURE — G8428 CUR MEDS NOT DOCUMENT: HCPCS | Performed by: INTERNAL MEDICINE

## 2022-10-11 RX ORDER — NYSTATIN 100000 [USP'U]/G
POWDER TOPICAL
Qty: 60 G | Refills: 2 | Status: SHIPPED | OUTPATIENT
Start: 2022-10-11

## 2022-10-11 NOTE — PROGRESS NOTES
CC -   LUC, Obesity    BACKGROUND -   Last visit: 08/03/22  First visit: 11/18/21    Obesity   Began in childhood  Initial BMI 61.6, Wt 347.6 lbs, Ht 5' 3\"  HS Grad wt 190 lbs   Lowest   wt  165 lbs age 24  Highest  wt  416 lbs  Pattern of wt gain: grad overall, but with sig losses followed by rapid regain  Wt change past yr: gained 40 lbs, then down 35 lbs  Most wt lost: 94 lbs (food logging + exercise)  Other diets attempted: [employer], SF, Meridia, Fad    Desire to lose wt: 10/10  Prob posed by appetite: 7/10    Initial Diet:    Number of meals per day - 4    Number of snacks per day - 6    Meal volume - 12\" plate, sometimes seconds    Fast food/convenience store - 6-8x/week    Restaurants (not fast food) - 1-2x/week   Sweets - 7d/week (8-10 oreos/day or other chocolate snacks/desserts)   Chips - 5-6d/week (8 oz bag)   Crackers/pretzels - 2d/week (2 cups Cheezits)   Nuts - 0d/week   Peanut Butter - 3d/week (4 Tbsp on bread as a meal)   Popcorn - 2d/week (reg sized MW bag)   Dried fruit - 0d/week   Whole fruit - 5-6d/week (1 serving)   Breakfast cereal - 0-1d/week   Granola/Protein/Energy bar - 0d/week   Sugar sweetened beverages - 40 oz reg soda/d, SB Trenta Refresher Tea 4x/wk (190 each)   Protein - No supplements   Fiber - No supplements     Exercise:    Gym membership - none    Walking - none    Running - none    Resistance - none    Aerobic class - none    ______________________    [facility] -  Past Medical History:   Diagnosis Date    Anxiety     Coronary arteriosclerosis     Degenerative joint disease     involving multiple joints    Depression     Hyperlipidemia     Hypothyroidism     MI, old 1998    Obesity     Palpitations     Peptic reflux disease     Sleep apnea     Type 2 diabetes mellitus without complication, without long-term current use of insulin ([facility]) 2/17/2022     Current Outpatient Medications   Medication Sig Dispense Refill    venlafaxine (EFFEXOR XR) 37.5 MG extended release capsule TAKE 1 CAPSULE BY MOUTH EVERY DAY 90 capsule 2    atenolol (TENORMIN) 25 MG tablet TAKE 1 TABLET BY MOUTH THREE TIMES A  tablet 1    metFORMIN (GLUCOPHAGE) 500 MG tablet Take 1 tablet by mouth in the morning and 1 tablet in the evening. Take with meals. 180 tablet 5    torsemide (DEMADEX) 10 MG tablet Take 1 tablet by mouth in the morning. 90 tablet 5    potassium chloride (KLOR-CON M20) 20 MEQ extended release tablet Take 1 tablet by mouth in the morning. 90 tablet 12    amitriptyline (ELAVIL) 25 MG tablet Take 1 tablet by mouth nightly 90 tablet 5    valsartan (DIOVAN) 80 MG tablet Take 1 tablet by mouth daily 90 tablet 5    pantoprazole (PROTONIX) 40 MG tablet Take 1 tablet by mouth daily 90 tablet 3    levothyroxine (SYNTHROID) 150 MCG tablet Take 1 tablet by mouth daily 90 tablet 5    buPROPion (WELLBUTRIN XL) 150 MG extended release tablet Take 1 tablet by mouth every morning 90 tablet 5    simvastatin (ZOCOR) 40 MG tablet Take 1 tablet by mouth nightly 90 tablet 12    aspirin 81 MG chewable tablet Take 81 mg by mouth daily        No current facility-administered medications for this visit. PE -  Gen : /79 (Site: Left Upper Arm, Position: Sitting, Cuff Size: Large Adult)   Pulse 70   Temp 97.6 °F (36.4 °C) (Temporal)   Ht 5' 3\" (1.6 m)   Wt 259 lb 6.4 oz (117.7 kg)   BMI 45.95 kg/m²    WN, WD, NAD  Heart:  RRR w/o mgr, no carotid bruits, + LE edema  Lung: Nml resp effort, CTA b/l  Psych: Normal mood   Full affect  Neuro:  Moves all ext well  ______________________    HISTORY & ASSESSMENT/PLAN -     Problem 1 - LUC  HPI  - Diagnosed 2/21/21   Severe   Followed by Dr. Suzanne Chris   Wears APAP 7d/wk, full duration of sleep   Daytime sleepiness the past week 3/10 (improved after having her mask resized due to her weight loss)   Uses Somnifix mouth tape to keep her mouth closed at night  Assessment  - Controlled with positive pressure support, excess weight is worsening her underlying airway obstruction and impairing her oxygenation  Plan - Wt reduction per the plan below    Problem 2 - Obesity  HPI - See above Background for description   Weight  Date   347.6 lbs 11/18/21 (had lost 35 lbs prior to seeing me)     11/30/21  349.0 lbs home, started diet on this day   332.0 lbs 12/30/21  332.0 lbs home   321.0 lbs 01/26/22     308.8 lbs 03/03/22  306.4 lbs home   300.0 lbs 04/12/22  298.6 lbs home   286.0 lbs 05/25/22  284.2 lbs home (diagnosed with Covid 5/26/22)   274.2 lbs 06/29/22  273.4 lbs home   265.2 lbs 08/03/22  264.1 lbs home   259.4 lbs 10/11/22  257.4 lbs home  Total wt loss to date:  88.2 lbs  DEN = 1900 rachel/d = 13,300 rachel/wk  Avg daily energy variance:   11/30/21 - 12/30/21 = - 15.4 lbs/30d = - 1,800 rachel/d deficit   12/30/21 - 01/26/22 = - 11.0 lbs /27d = - 1,426 rachel/d deficit   01/26/22 - 03/03/22 = - 12.4 lbs /36d = - 1,205 rachel/d deficit   03/03/22 - 04/12/22 = -   7.8 lbs/40d = -   682 rachel/d deficit   04/12/22 - 05/25/22 = -  14.4 lbs/43d = - 1,172 rachel/d deficit   05/25/22 - 06/29/22 = -  11.8 lbs/35d = - 1,180 rachel/d deficit   06/29/22 - 08/03/22 = -    9.3 lbs/35d = -    930 rachel/d deficit   08/03/22 - 10/11/22 = -    6.7 lbs/69d = -    340 rachel/d deficit    Wt effect of HR foods = Restaurant food 1275 rachel/w + Sweets 3,465 + Chips/Crackers/PC 8,240 + SSB 4,120 = 17,100 rachel/wk = 2,442 rachel/d = 128% DEN = 244 lbs/yr    Notes from previous visits: The above numbers for HR foods are inaccurately high. However, they still serve to show that these foods occupy an inordinate percentage of her daily energy needs. Reduction of them alone, therefore, may be sufficient to produce an acceptable rate of weight loss. She states that she struggles with emotional eating. Her depression is uncontrolled at the present time. Since she also has anxiety, venlafaxine should be added to bupropion.  (The dose of amitriptyline is small and therefore the increased risk of serotonin syndrome is very small; I discussed this with her.) My hope is that she will experience some appetite suppression from the venlafaxine. She has a strong family history of early heart disease and some issues in the past with ectopy. Phentermine is therefore not a good choice for her. She preferred venlafaxine over topiramate b/c of the side effect profile (also she is taking a loop diurectic)  Did not tolerate venlafaxine due to poor overall sense of well being. However, would like to try it again. No other options are available other than topiramate. Retried it at her request on 12/30/21 and she was able to tolerate it  Update:   Current DEN 1830 rachel/d  Calorie monitoring - counting 7d/wk, avg calorie intake 1500 rachel/day (uses My Fitness Pal)  Sweets - 1d/mo  Salt-type snacks - <100 rachel/day, 1-2d/wk  Restaurant food - 1-2x/mo  SSB - none  Fiber - 28 grams/day (includes 1/2 cup Fiber One)  Exercise - walking 2mi, 2d/wk (limited by knee pain), pool has closed  Protein intake - avg 70-75 grams (uses a food logging camila that includes macronutrients)  Appetite suppressant -  Venlafaxine 37.5 mg, one tablet every day, 4-5/10 appetite suppression, Side effects -  + elevation of mood, + dry mouth (controlled with mouth tape)  Having some palpitations at night in bed. TSH 9/26/22 0.419.    Water 70-75 oz daily (96 oz caused nocturia)  Assessment  - Improved; cont same plan and venlafaxine; consider reducing the dose of LT4 for four days to see if it improves the palpitations; if they improve, then the TSH should be increased; venlafaxine is not reported to cause them  Plan   -   Patient Instructions   Count every calorie every day    Limit calories to 1500 rachel/day    Limit sweets to one day per month    Limit salty snacks to 100 rachel/day    Do not drink any sugar sweetened beverages    Limit restaurant food to once every two weeks    Protein intake should be 65 grams/day    Fiber intake should be 22 grams/day (work up to this amount slowly; use wheat dextrin)    Take venlafaxine ER 37.5 mg one tablet every day.      Make sure that water intake is >75 oz/day    Take one-half tab of LT4 150 mcg daily for four days and then assess effect on palpitations over the next week    Follow up  5-7 weeks    Medication management: LT4    Navjot Delgado MD  Endocrinology/Obesity  10/11/22

## 2022-10-11 NOTE — PATIENT INSTRUCTIONS
Count every calorie every day    Limit calories to 1500 rachel/day    Limit sweets to one day per month    Limit salty snacks to 100 rachel/day    Do not drink any sugar sweetened beverages    Limit restaurant food to once every two weeks    Protein intake should be 65 grams/day    Fiber intake should be 22 grams/day (work up to this amount slowly; use wheat dextrin)    Take venlafaxine ER 37.5 mg one tablet every day.      Make sure that water intake is >75 oz/day    Take one-half tab of LT4 150 mcg daily for four days and then assess effect on palpitations over the next week    Follow up  5-7 weeks

## 2022-10-13 ENCOUNTER — HOSPITAL ENCOUNTER (OUTPATIENT)
Dept: CT IMAGING | Age: 57
Discharge: HOME | End: 2022-10-15
Payer: COMMERCIAL

## 2022-10-13 DIAGNOSIS — K76.0 NAFLD (NONALCOHOLIC FATTY LIVER DISEASE): ICD-10-CM

## 2022-10-13 DIAGNOSIS — R16.0 HEPATOMEGALY: ICD-10-CM

## 2022-10-13 DIAGNOSIS — E27.8 LEFT ADRENAL MASS (HCC): ICD-10-CM

## 2022-10-13 PROCEDURE — 6360000004 HC RX CONTRAST MEDICATION: Performed by: RADIOLOGY

## 2022-10-13 PROCEDURE — 2580000003 HC RX 258: Performed by: RADIOLOGY

## 2022-10-13 PROCEDURE — 74160 CT ABDOMEN W/CONTRAST: CPT

## 2022-10-13 RX ORDER — SODIUM CHLORIDE 0.9 % (FLUSH) 0.9 %
10 SYRINGE (ML) INJECTION PRN
Status: COMPLETED | OUTPATIENT
Start: 2022-10-13 | End: 2022-10-13

## 2022-10-13 RX ADMIN — IOPAMIDOL 100 ML: 755 INJECTION, SOLUTION INTRAVENOUS at 08:25

## 2022-10-13 RX ADMIN — SODIUM CHLORIDE, PRESERVATIVE FREE 10 ML: 5 INJECTION INTRAVENOUS at 08:25

## 2022-10-21 ENCOUNTER — OFFICE VISIT (OUTPATIENT)
Dept: HEMATOLOGY | Age: 57
End: 2022-10-21
Payer: COMMERCIAL

## 2022-10-21 VITALS
RESPIRATION RATE: 16 BRPM | BODY MASS INDEX: 46.25 KG/M2 | WEIGHT: 261 LBS | HEIGHT: 63 IN | DIASTOLIC BLOOD PRESSURE: 91 MMHG | SYSTOLIC BLOOD PRESSURE: 150 MMHG | OXYGEN SATURATION: 98 % | TEMPERATURE: 97.9 F | HEART RATE: 64 BPM

## 2022-10-21 DIAGNOSIS — E27.8 ADRENAL MASS (HCC): Primary | ICD-10-CM

## 2022-10-21 DIAGNOSIS — K76.0 NAFLD (NONALCOHOLIC FATTY LIVER DISEASE): ICD-10-CM

## 2022-10-21 PROCEDURE — G8484 FLU IMMUNIZE NO ADMIN: HCPCS | Performed by: TRANSPLANT SURGERY

## 2022-10-21 PROCEDURE — 99213 OFFICE O/P EST LOW 20 MIN: CPT | Performed by: TRANSPLANT SURGERY

## 2022-10-21 PROCEDURE — 99212 OFFICE O/P EST SF 10 MIN: CPT | Performed by: TRANSPLANT SURGERY

## 2022-10-21 PROCEDURE — G8427 DOCREV CUR MEDS BY ELIG CLIN: HCPCS | Performed by: TRANSPLANT SURGERY

## 2022-10-21 PROCEDURE — 3017F COLORECTAL CA SCREEN DOC REV: CPT | Performed by: TRANSPLANT SURGERY

## 2022-10-21 PROCEDURE — 1036F TOBACCO NON-USER: CPT | Performed by: TRANSPLANT SURGERY

## 2022-10-21 PROCEDURE — G8417 CALC BMI ABV UP PARAM F/U: HCPCS | Performed by: TRANSPLANT SURGERY

## 2022-10-21 NOTE — PROGRESS NOTES
Hepatobiliary and Pancreatic Surgery Attending History and Physical    Patient's Name/Date of Birth: Ghazal Patel /1965 (54 y.o.)    Date: 2022     CC: NAFLD and adrenal adenoma    HPI:  She states that overall she is feeling great and not having any issues. She has not had an attack since starting elavil. Her transaminases are normal and her HbA1c is now down to 5.2. Down 120lbs. Family history: Biologic mother  of liver cancer as well as her family. Physical Exam:  BP (!) 150/91   Pulse 64   Temp 97.9 °F (36.6 °C) (Temporal)   Resp 16   Ht 5' 3\" (1.6 m)   Wt 261 lb (118.4 kg)   SpO2 98%   BMI 46.23 kg/m²       PSYCH: mood and affect normal, alert and oriented x 3: No apparent distress, comfortable  EYES: Sclera white, pupils equal round and reactive to light  ENMT:  Hearing normal, trachea midline, ears externally intact  LYMPH: no obvious lympadenopathy in neck. RESP: Respiratory effort was normal with no retractions or use of accessory muscles. CV:  No pedal edema  GI/ Abdomen: Soft, nondistended, nontender, no guarding, no peritoneal signs  MSK: no clubbing/ no cyanosis/ gaitnormal       Assessment/Plan:  Mind Gut phenomem, 14mm left adrenal nodule, NAFLD, morbid obesity with BMI = 45 (was 60)  - continue elavil 25mg  - non functioning adrenal adenoma - stable  - I reviewed their images prior to our office visit and we also reviewed them together today. - liver continues to looks essentially normal with sharp edges  - US in 6 months  - MRI in a year to evaluate adrenal adenoma     20 Minutes of which greater than 50% was spent counseling or coordinating her care. Thank you for the consultation allowing me to take part in Ms. Keller's care.      Danny Brito M.D.  10/21/2022  7:59 AM

## 2022-11-10 ENCOUNTER — OFFICE VISIT (OUTPATIENT)
Dept: BARIATRICS/WEIGHT MGMT | Age: 57
End: 2022-11-10
Payer: COMMERCIAL

## 2022-11-10 VITALS
SYSTOLIC BLOOD PRESSURE: 115 MMHG | TEMPERATURE: 97.1 F | BODY MASS INDEX: 46.46 KG/M2 | DIASTOLIC BLOOD PRESSURE: 76 MMHG | HEART RATE: 65 BPM | HEIGHT: 63 IN | WEIGHT: 262.2 LBS

## 2022-11-10 DIAGNOSIS — G47.33 SLEEP APNEA, OBSTRUCTIVE: Primary | ICD-10-CM

## 2022-11-10 DIAGNOSIS — E66.01 CLASS 3 SEVERE OBESITY DUE TO EXCESS CALORIES WITHOUT SERIOUS COMORBIDITY WITH BODY MASS INDEX (BMI) OF 60.0 TO 69.9 IN ADULT (HCC): ICD-10-CM

## 2022-11-10 PROCEDURE — G8484 FLU IMMUNIZE NO ADMIN: HCPCS | Performed by: INTERNAL MEDICINE

## 2022-11-10 PROCEDURE — G8417 CALC BMI ABV UP PARAM F/U: HCPCS | Performed by: INTERNAL MEDICINE

## 2022-11-10 PROCEDURE — 3074F SYST BP LT 130 MM HG: CPT | Performed by: INTERNAL MEDICINE

## 2022-11-10 PROCEDURE — 3017F COLORECTAL CA SCREEN DOC REV: CPT | Performed by: INTERNAL MEDICINE

## 2022-11-10 PROCEDURE — G8428 CUR MEDS NOT DOCUMENT: HCPCS | Performed by: INTERNAL MEDICINE

## 2022-11-10 PROCEDURE — 1036F TOBACCO NON-USER: CPT | Performed by: INTERNAL MEDICINE

## 2022-11-10 PROCEDURE — 99211 OFF/OP EST MAY X REQ PHY/QHP: CPT

## 2022-11-10 PROCEDURE — 99214 OFFICE O/P EST MOD 30 MIN: CPT | Performed by: INTERNAL MEDICINE

## 2022-11-10 PROCEDURE — 3078F DIAST BP <80 MM HG: CPT | Performed by: INTERNAL MEDICINE

## 2022-11-10 NOTE — PROGRESS NOTES
CC -   LUC, Obesity    BACKGROUND -   Last visit: 10/11/22  First visit: 11/18/21    Obesity   Began in childhood  Initial BMI 61.6, Wt 347.6 lbs, Ht 5' 3\"  HS Grad wt 190 lbs   Lowest   wt  165 lbs age 24  Highest  wt  416 lbs  Pattern of wt gain: grad overall, but with sig losses followed by rapid regain  Wt change past yr: gained 40 lbs, then down 35 lbs  Most wt lost: 94 lbs (food logging + exercise)  Other diets attempted: [employer], SF, Meridia, Fad    Desire to lose wt: 10/10  Prob posed by appetite: 7/10    Initial Diet:    Number of meals per day - 4    Number of snacks per day - 6    Meal volume - 12\" plate, sometimes seconds    Fast food/convenience store - 6-8x/week    Restaurants (not fast food) - 1-2x/week   Sweets - 7d/week (8-10 oreos/day or other chocolate snacks/desserts)   Chips - 5-6d/week (8 oz bag)   Crackers/pretzels - 2d/week (2 cups Cheezits)   Nuts - 0d/week   Peanut Butter - 3d/week (4 Tbsp on bread as a meal)   Popcorn - 2d/week (reg sized MW bag)   Dried fruit - 0d/week   Whole fruit - 5-6d/week (1 serving)   Breakfast cereal - 0-1d/week   Granola/Protein/Energy bar - 0d/week   Sugar sweetened beverages - 40 oz reg soda/d, SB Trenta Refresher Tea 4x/wk (190 each)   Protein - No supplements   Fiber - No supplements     Exercise:    Gym membership - none    Walking - none    Running - none    Resistance - none    Aerobic class - none    ______________________    [facility] -  Past Medical History:   Diagnosis Date    Anxiety     Coronary arteriosclerosis     Degenerative joint disease     involving multiple joints    Depression     Hyperlipidemia     Hypothyroidism     MI, old 1998    Obesity     Palpitations     Peptic reflux disease     Sleep apnea     Type 2 diabetes mellitus without complication, without long-term current use of insulin ([facility]) 2/17/2022     Current Outpatient Medications   Medication Sig Dispense Refill    nystatin (MYCOSTATIN) 445058 UNIT/GM powder Apply 3 times daily.  60 g 2 venlafaxine (EFFEXOR XR) 37.5 MG extended release capsule TAKE 1 CAPSULE BY MOUTH EVERY DAY 90 capsule 2    atenolol (TENORMIN) 25 MG tablet TAKE 1 TABLET BY MOUTH THREE TIMES A  tablet 1    metFORMIN (GLUCOPHAGE) 500 MG tablet Take 1 tablet by mouth in the morning and 1 tablet in the evening. Take with meals. 180 tablet 5    torsemide (DEMADEX) 10 MG tablet Take 1 tablet by mouth in the morning. 90 tablet 5    potassium chloride (KLOR-CON M20) 20 MEQ extended release tablet Take 1 tablet by mouth in the morning. 90 tablet 12    amitriptyline (ELAVIL) 25 MG tablet Take 1 tablet by mouth nightly 90 tablet 5    valsartan (DIOVAN) 80 MG tablet Take 1 tablet by mouth daily 90 tablet 5    pantoprazole (PROTONIX) 40 MG tablet Take 1 tablet by mouth daily 90 tablet 3    levothyroxine (SYNTHROID) 150 MCG tablet Take 1 tablet by mouth daily 90 tablet 5    buPROPion (WELLBUTRIN XL) 150 MG extended release tablet Take 1 tablet by mouth every morning 90 tablet 5    simvastatin (ZOCOR) 40 MG tablet Take 1 tablet by mouth nightly 90 tablet 12    aspirin 81 MG chewable tablet Take 81 mg by mouth daily        No current facility-administered medications for this visit. PE -  Gen : /76 (Site: Left Upper Arm, Position: Sitting, Cuff Size: Large Adult)   Pulse 65   Temp 97.1 °F (36.2 °C) (Temporal)   Ht 5' 3\" (1.6 m)   Wt 262 lb 3.2 oz (118.9 kg)   BMI 46.45 kg/m²    WN, WD, NAD  Heart:  RRR w/o mgr, no carotid bruits, + LE edema  Lung: Nml resp effort, CTA b/l  Psych: Normal mood   Full affect  Neuro:  Moves all ext well  ______________________    HISTORY & ASSESSMENT/PLAN -     Problem 1 - LUC  HPI  - Diagnosed 2/21/21   Severe   Followed by Dr. Rosalia Mosley   Wears APAP 7d/wk, full duration of sleep   Daytime sleepiness the past week 3/10  Assessment  - Controlled with positive pressure support, excess weight is worsening her underlying airway obstruction and impairing her oxygenation  Plan - Wt reduction per the plan below    Problem 2 - Obesity  HPI - See above Background for description   Weight  Date   347.6 lbs 11/18/21 (had lost 35 lbs prior to seeing me)     11/30/21  349.0 lbs home, started diet on this day   332.0 lbs 12/30/21  332.0 lbs home   321.0 lbs 01/26/22     308.8 lbs 03/03/22  306.4 lbs home   300.0 lbs 04/12/22  298.6 lbs home   286.0 lbs 05/25/22  284.2 lbs home (diagnosed with Covid 5/26/22)   274.2 lbs 06/29/22  273.4 lbs home   265.2 lbs 08/03/22  264.1 lbs home   259.4 lbs 10/11/22  257.4 lbs home   262.2 lbs 11/10/22  259.7 lbs home  Total wt loss to date:  85.4 lbs  DEN = 1900 rachel/d = 13,300 rachel/wk  Avg daily energy variance:   11/30/21 - 12/30/21 = - 15.4 lbs/30d = - 1,800 rachel/d deficit   12/30/21 - 01/26/22 = - 11.0 lbs /27d = - 1,426 rachel/d deficit   01/26/22 - 03/03/22 = - 12.4 lbs /36d = - 1,205 rachel/d deficit   03/03/22 - 04/12/22 = -   7.8 lbs/40d = -   682 rachel/d deficit   04/12/22 - 05/25/22 = -  14.4 lbs/43d = - 1,172 rachel/d deficit   05/25/22 - 06/29/22 = -  11.8 lbs/35d = - 1,180 rachel/d deficit   06/29/22 - 08/03/22 = -    9.3 lbs/35d = -    930 rachel/d deficit   08/03/22 - 10/11/22 = -    6.7 lbs/69d = -    340 rachel/d deficit   10/11/22 - 11/10/22 = +   2.3 lbs/30d = +   268 rachel/d excess    Wt effect of HR foods = Restaurant food 1275 rachel/w + Sweets 3,465 + Chips/Crackers/PC 8,240 + SSB 4,120 = 17,100 rachel/wk = 2,442 rachel/d = 128% DEN = 244 lbs/yr    Notes from previous visits: The above numbers for HR foods are inaccurately high. However, they still serve to show that these foods occupy an inordinate percentage of her daily energy needs. Reduction of them alone, therefore, may be sufficient to produce an acceptable rate of weight loss. She states that she struggles with emotional eating. Her depression is uncontrolled at the present time. Since she also has anxiety, venlafaxine should be added to bupropion.  (The dose of amitriptyline is small and therefore the increased risk of serotonin syndrome is very small; I discussed this with her.) My hope is that she will experience some appetite suppression from the venlafaxine. She has a strong family history of early heart disease and some issues in the past with ectopy. Phentermine is therefore not a good choice for her. She preferred venlafaxine over topiramate b/c of the side effect profile (also she is taking a loop diurectic)  Did not tolerate venlafaxine due to poor overall sense of well being. However, would like to try it again. No other options are available other than topiramate. Retried it at her request on 12/30/21 and she was able to tolerate it    Update:   Current DEN 1830 rachel/d  Calorie monitoring - counting 7d/wk, avg calorie intake 1400 rachel/day (uses My Fitness Pal)  Sweets - 1d/mo  Salt-type snacks - <100 rachel/day, 1-2d/wk  Restaurant food - 1-2x/mo  SSB - none  Fiber - 28 grams/day (includes 1/2 cup Fiber One)  Exercise - walking 1mi, 2d/wk (limited by knee pain)  Protein intake - avg 60-65 grams (uses a food logging camila that includes macronutrients)  Appetite suppressant -  Venlafaxine 37.5 mg, one tablet every day, 6/10 appetite suppression, Side effects -  + elevation of mood, + dry mouth (stopped using mouth tape b/c of cost)  Having some palpitations at night in bed. TSH 9/26/22 0.419.    Water 70 oz daily (96 oz caused nocturia)  Assessment  - No significant change on the scale; however, she is still losing fat and lean mass per her careful calorie counting; she may have entered a prolonged period of \"scale correction\" given the very rapid rate of wt loss she experienced over a long period of time (ie, the caloric deficit what greater than what would be expected based on her calorie needs); cont same plan and venlafaxine; decreasing her soda intake stopped the palpitations  Plan   -   Count every calorie every day    Limit calories to 1500 rachel/day    Limit sweets to one day per month    Limit salty snacks to 100 rachel/day    Do not drink any sugar sweetened beverages    Limit restaurant food to once every two weeks    Protein intake should be 65 grams/day    Fiber intake should be 22 grams/day (work up to this amount slowly; use wheat dextrin)    Take venlafaxine ER 37.5 mg one tablet every day.      Make sure that water intake is >75 oz/day    Follow up  Dec 13 as previously scheduled    Total time spent on the encounter: 30 min      Marsha Solitario MD  Endocrinology/Obesity  11/10/22

## 2022-11-10 NOTE — PATIENT INSTRUCTIONS
Count every calorie every day    Limit calories to 1500 rachel/day    Limit sweets to one day per month    Limit salty snacks to 100 rachel/day    Do not drink any sugar sweetened beverages    Limit restaurant food to once every two weeks    Protein intake should be 65 grams/day    Fiber intake should be 22 grams/day (work up to this amount slowly; use wheat dextrin)    Take venlafaxine ER 37.5 mg one tablet every day.      Make sure that water intake is >75 oz/day    Follow up  Dec 13 as previously scheduled

## 2022-12-13 ENCOUNTER — OFFICE VISIT (OUTPATIENT)
Dept: BARIATRICS/WEIGHT MGMT | Age: 57
End: 2022-12-13
Payer: COMMERCIAL

## 2022-12-13 VITALS
WEIGHT: 262 LBS | BODY MASS INDEX: 46.42 KG/M2 | DIASTOLIC BLOOD PRESSURE: 81 MMHG | SYSTOLIC BLOOD PRESSURE: 139 MMHG | TEMPERATURE: 98.1 F | HEART RATE: 81 BPM | HEIGHT: 63 IN

## 2022-12-13 DIAGNOSIS — E66.01 CLASS 3 SEVERE OBESITY DUE TO EXCESS CALORIES WITHOUT SERIOUS COMORBIDITY WITH BODY MASS INDEX (BMI) OF 60.0 TO 69.9 IN ADULT (HCC): ICD-10-CM

## 2022-12-13 DIAGNOSIS — G47.33 SLEEP APNEA, OBSTRUCTIVE: Primary | ICD-10-CM

## 2022-12-13 PROCEDURE — 99213 OFFICE O/P EST LOW 20 MIN: CPT | Performed by: INTERNAL MEDICINE

## 2022-12-13 PROCEDURE — 99211 OFF/OP EST MAY X REQ PHY/QHP: CPT

## 2022-12-13 PROCEDURE — 1036F TOBACCO NON-USER: CPT | Performed by: INTERNAL MEDICINE

## 2022-12-13 PROCEDURE — 3078F DIAST BP <80 MM HG: CPT | Performed by: INTERNAL MEDICINE

## 2022-12-13 PROCEDURE — 3017F COLORECTAL CA SCREEN DOC REV: CPT | Performed by: INTERNAL MEDICINE

## 2022-12-13 PROCEDURE — G8417 CALC BMI ABV UP PARAM F/U: HCPCS | Performed by: INTERNAL MEDICINE

## 2022-12-13 PROCEDURE — 3074F SYST BP LT 130 MM HG: CPT | Performed by: INTERNAL MEDICINE

## 2022-12-13 PROCEDURE — G8428 CUR MEDS NOT DOCUMENT: HCPCS | Performed by: INTERNAL MEDICINE

## 2022-12-13 PROCEDURE — G8484 FLU IMMUNIZE NO ADMIN: HCPCS | Performed by: INTERNAL MEDICINE

## 2022-12-13 NOTE — PROGRESS NOTES
CC -   LUC, Obesity    BACKGROUND -   Last visit: 11/10/22  First visit: 11/18/21    Obesity   Began in childhood  Initial BMI 61.6, Wt 347.6 lbs, Ht 5' 3\"  HS Grad wt 190 lbs   Lowest   wt  165 lbs age 24  Highest  wt  416 lbs  Pattern of wt gain: grad overall, but with sig losses followed by rapid regain  Wt change past yr: gained 40 lbs, then down 35 lbs  Most wt lost: 94 lbs (food logging + exercise)  Other diets attempted: [facility], SF, Meridia, Fad    Desire to lose wt: 10/10  Prob posed by appetite: 7/10    Initial Diet:    Number of meals per day - 4    Number of snacks per day - 6    Meal volume - 12\" plate, sometimes seconds    Fast food/convenience store - 6-8x/week    Restaurants (not fast food) - 1-2x/week   Sweets - 7d/week (8-10 oreos/day or other chocolate snacks/desserts)   Chips - 5-6d/week (8 oz bag)   Crackers/pretzels - 2d/week (2 cups Cheezits)   Nuts - 0d/week   Peanut Butter - 3d/week (4 Tbsp on bread as a meal)   Popcorn - 2d/week (reg sized MW bag)   Dried fruit - 0d/week   Whole fruit - 5-6d/week (1 serving)   Breakfast cereal - 0-1d/week   Granola/Protein/Energy bar - 0d/week   Sugar sweetened beverages - 40 oz reg soda/d, SB Trenta Refresher Tea 4x/wk (190 each)   Protein - No supplements   Fiber - No supplements     Exercise:    Gym membership - none    Walking - none    Running - none    Resistance - none    Aerobic class - none    ______________________    [facility]  Past Medical History:   Diagnosis Date    Anxiety     Coronary arteriosclerosis     Degenerative joint disease     involving multiple joints    Depression     Hyperlipidemia     Hypothyroidism     MI, old 1998    Obesity     Palpitations     Peptic reflux disease     Sleep apnea     Type 2 diabetes mellitus without complication, without long-term current use of insulin ([facility]) 2/17/2022     Current Outpatient Medications   Medication Sig Dispense Refill    nystatin (MYCOSTATIN) 911901 UNIT/GM powder Apply 3 times daily.  60 g 2 venlafaxine (EFFEXOR XR) 37.5 MG extended release capsule TAKE 1 CAPSULE BY MOUTH EVERY DAY 90 capsule 2    atenolol (TENORMIN) 25 MG tablet TAKE 1 TABLET BY MOUTH THREE TIMES A  tablet 1    metFORMIN (GLUCOPHAGE) 500 MG tablet Take 1 tablet by mouth in the morning and 1 tablet in the evening. Take with meals. 180 tablet 5    torsemide (DEMADEX) 10 MG tablet Take 1 tablet by mouth in the morning. 90 tablet 5    potassium chloride (KLOR-CON M20) 20 MEQ extended release tablet Take 1 tablet by mouth in the morning. 90 tablet 12    amitriptyline (ELAVIL) 25 MG tablet Take 1 tablet by mouth nightly 90 tablet 5    valsartan (DIOVAN) 80 MG tablet Take 1 tablet by mouth daily 90 tablet 5    pantoprazole (PROTONIX) 40 MG tablet Take 1 tablet by mouth daily 90 tablet 3    levothyroxine (SYNTHROID) 150 MCG tablet Take 1 tablet by mouth daily 90 tablet 5    buPROPion (WELLBUTRIN XL) 150 MG extended release tablet Take 1 tablet by mouth every morning 90 tablet 5    simvastatin (ZOCOR) 40 MG tablet Take 1 tablet by mouth nightly 90 tablet 12    aspirin 81 MG chewable tablet Take 81 mg by mouth daily        No current facility-administered medications for this visit. PE -  Gen : /81 (Site: Left Upper Arm, Position: Sitting, Cuff Size: Large Adult)   Pulse 81   Temp 98.1 °F (36.7 °C)   Ht 5' 3\" (1.6 m)   Wt 262 lb (118.8 kg)   BMI 46.41 kg/m²    WN, WD, NAD  Heart:  RRR w/o mgr, no carotid bruits, + LE edema  Lung: Nml resp effort, CTA b/l  Psych: Normal mood   Full affect  Neuro:  Moves all ext well  ______________________    HISTORY & ASSESSMENT/PLAN -     Problem 1 - LUC  HPI  - Diagnosed 2/21/21   Severe   Followed by Dr. Jose Denney   Wears APAP 7d/wk, full duration of sleep   Daytime sleepiness the past week 7/10 (was 3/10, awakening throughout the night)  Assessment  - Controlled with positive pressure support, excess weight is worsening her underlying airway obstruction and impairing her oxygenation  Plan - Wt reduction per the plan below    Problem 2 - Obesity  HPI - See above Background for description   Weight  Date   347.6 lbs 11/18/21 (had lost 35 lbs prior to seeing me)     11/30/21  349.0 lbs home, started diet on this day   332.0 lbs 12/30/21  332.0 lbs home   321.0 lbs 01/26/22     308.8 lbs 03/03/22  306.4 lbs home   300.0 lbs 04/12/22  298.6 lbs home   286.0 lbs 05/25/22  284.2 lbs home (diagnosed with Covid 5/26/22)   274.2 lbs 06/29/22  273.4 lbs home   265.2 lbs 08/03/22  264.1 lbs home   259.4 lbs 10/11/22  257.4 lbs home   262.2 lbs 11/10/22  259.7 lbs home   262.0 lbs 12/13/22  258.2 lbs home  Total wt loss to date: 85.6 lbs  DEN = 1900 rachel/d = 13,300 rachel/wk  Avg daily energy variance:   11/30/21 - 12/30/21 = - 15.4 lbs/30d = - 1,800 rachel/d deficit   12/30/21 - 01/26/22 = - 11.0 lbs /27d = - 1,426 rachel/d deficit   01/26/22 - 03/03/22 = - 12.4 lbs /36d = - 1,205 rachel/d deficit   03/03/22 - 04/12/22 = -   7.8 lbs/40d = -   682 rachel/d deficit   04/12/22 - 05/25/22 = -  14.4 lbs/43d = - 1,172 rachel/d deficit   05/25/22 - 06/29/22 = -  11.8 lbs/35d = - 1,180 rachel/d deficit   06/29/22 - 08/03/22 = -    9.3 lbs/35d = -    930 rachel/d deficit   08/03/22 - 10/11/22 = -    6.7 lbs/69d = -    340 rachel/d deficit   10/11/22 - 11/10/22 = +   2.3 lbs/30d = +   268 rachel/d excess   11/10/22 - 12/13/22 = -    0.2 lbs/33d = -    Wt effect of HR foods = Restaurant food 1275 rachel/w + Sweets 3,465 + Chips/Crackers/PC 8,240 + SSB 4,120 = 17,100 rachel/wk = 2,442 rachel/d = 128% DEN = 244 lbs/yr    Notes from previous visits: The above numbers for HR foods are inaccurately high. However, they still serve to show that these foods occupy an inordinate percentage of her daily energy needs. Reduction of them alone, therefore, may be sufficient to produce an acceptable rate of weight loss. She states that she struggles with emotional eating. Her depression is uncontrolled at the present time.  Since she also has anxiety, venlafaxine should be added to bupropion. (The dose of amitriptyline is small and therefore the increased risk of serotonin syndrome is very small; I discussed this with her.) My hope is that she will experience some appetite suppression from the venlafaxine. She has a strong family history of early heart disease and some issues in the past with ectopy. Phentermine is therefore not a good choice for her. She preferred venlafaxine over topiramate b/c of the side effect profile (also she is taking a loop diurectic)  Did not tolerate venlafaxine due to poor overall sense of well being. However, would like to try it again. No other options are available other than topiramate. Retried it at her request on 12/30/21 and she was able to tolerate it    Update:   Current DEN 1830 rachel/d  Calorie monitoring - counting 7d/wk, avg calorie intake 1400 rachel/day (uses My Fitness Pal)  Sweets - 1d/mo  Salt-type snacks - <100 rachel/day, 1-2d/wk  Restaurant food - 1-2x/mo  SSB - none  Fiber - 28 grams/day (includes 1/2 cup Fiber One)  Exercise - walking 1 - 1.5mi, 3d//wk (limited by knee pain)  Protein intake - avg 60-65 grams (uses a food logging camila that includes macronutrients)  Appetite suppressant -  Venlafaxine 37.5 mg, one tablet every day, 5/10 appetite suppression, Side effects -  + elevation of mood, + dry mouth (stopped using mouth tape b/c of cost)  Palpitations stopper  Water 65-70 oz daily (96 oz caused nocturia)  Assessment  - Slight improvement; cont with present plan without change; cont venlafaxine  Plan   -   Count every calorie every day    Limit calories to 1500 rachel/day    Limit sweets to one day per month    Limit salty snacks to 100 rachel/day    Do not drink any sugar sweetened beverages    Limit restaurant food to once every two weeks    Protein intake should be 65 grams/day    Fiber intake should be 22 grams/day (work up to this amount slowly; use wheat dextrin)    Take venlafaxine ER 37.5 mg one tablet every day.      Make sure that water intake is >75 oz/day    Follow up  1/17/22 as previously scheduled          Baldo Ramírez MD  Endocrinology/Obesity  12/13/22

## 2022-12-20 NOTE — PROGRESS NOTES
Jimena Conklin came into the office this morning and dropped off [address] for Dr Connor Bird to fill out. Gave copy to patient. Gave original to nurse. 7/11/2019    Yu Muñoz    Chief Complaint   Patient presents with    6 Month Follow-Up     - no c/o       HPI  History was obtained from patient & her  Paolo Crawford. Camilla Mendes is a 58 y.o. female who presents today after 6 months. Follow up HTN, GERD, OA of knee and Endometrial cancer.      -Reviewed the right upset patient's follow-ups with her endometrial cancer in [place]. She continues to do very well. She is graduated from every 3 month to q. 6-month appointments. She has no symptoms of recurrent recurrence or problems.    -Osteoarthritis of the knees, stable per patient.    -Blood pressure great today,. Patient takes her blood pressure at home. She notes similar results. She denies orthostatic symptoms. She notes compliance with medication. -GERD controlled with taking Pepcid 20 mg twice daily. Patient notes foods will trigger her symptoms. The symptoms are mild to moderate. Patient encouraged to continue to work on nutrition.    -Last labs reviewed from September 2018. They were good. Brian Anaya overall feels well, refills per list.            REVIEW OF SYMPTOMS    Review of Systems   Constitutional: Negative. Negative for chills and fever. HENT: Negative. Negative for rhinorrhea, sinus pressure and sore throat. Eyes: Negative. Respiratory: Negative. Negative for cough, chest tightness and shortness of breath. Cardiovascular: Negative. Gastrointestinal: Negative for abdominal pain, constipation and diarrhea. Endocrine: Negative. Genitourinary: Negative. Negative for dysuria and frequency. Musculoskeletal: Negative. Negative for myalgias. Allergic/Immunologic: Negative. Neurological: Negative. Hematological: Negative. Psychiatric/Behavioral: Positive for confusion.        PAST MEDICAL HISTORY  Past Medical History:   Diagnosis Date    Essential hypertension 6/28/2019    GERD (gastroesophageal reflux disease) 6/28/2019    History of malignant Hepatobiliary and Pancreatic Surgery Attending History and Physical    Patient's Name/Date of Birth: Maria Victoria Jiang /1965 (12 y.o.)    Date: 2021     CC:left upper quadrant abdominal pain    HPI:  Patient is a very pleasant 61year old female whom has had numerous medical problems over the past year. She has sleep apnea and is on bipap. She is also trying to lose weight. She has been having epigastric discomfort and used to be able to eat anything. Now she has burning pressure  That travels to her back. If she lays on her left side it goes away. Once she relaxes the pain is intolerable and dry heaves. Cardiology is concerned with her weight - Dr Huma Russell - had cardiac MRI - secondary to heart failure. She has lost 60lbs through diet. She Has a hiatal hernia found out on esophagram many years ago. 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 She states that the left upper quadrant pain was present  Was one year into the job when it happened. She said in December in  it lasted 12 hours. Feels the pain when shes stressed, worse in the left upper quadrant. She states that she moves her bowels daily and they are normal.  She is taking protonix daily.       Family history: Mom  of liver cancer    Past Medical History:   Diagnosis Date    Chest pain     Coronary arteriosclerosis     Degenerative joint disease     involving multiple joints    Dysthymia     Hyperlipidemia     Hypothyroidism     MI, old 0    Obesity     Palpitations     Peptic reflux disease     Sleep apnea     Thyroid disease        Past Surgical History:   Procedure Laterality Date     SECTION  1994     SECTION  1996    DIAGNOSTIC CARDIAC CATH LAB PROCEDURE      ENDOMETRIAL ABLATION      LIPOMA RESECTION      TONSILLECTOMY         Current Outpatient Medications   Medication Sig Dispense Refill    metFORMIN (GLUCOPHAGE) 500 MG tablet Take 1 tablet by mouth 2 times daily (with meals) 180 tablet 5    torsemide (DEMADEX) 10 MG tablet Take 1 tablet by mouth daily 90 tablet 5    atenolol (TENORMIN) 25 MG tablet Take 1 tablet by mouth 3 times daily 270 tablet 5    buPROPion (WELLBUTRIN XL) 150 MG extended release tablet Take 1 tablet by mouth every morning 90 tablet 5    levothyroxine (SYNTHROID) 150 MCG tablet Take 1 tablet by mouth daily 90 tablet 5    potassium chloride (KLOR-CON M20) 20 MEQ extended release tablet Take 1 tablet by mouth daily 90 tablet 12    simvastatin (ZOCOR) 40 MG tablet Take 1 tablet by mouth nightly 90 tablet 12    pantoprazole (PROTONIX) 40 MG tablet Take 1 tablet by mouth daily 90 tablet 3    aspirin 81 MG chewable tablet Take 81 mg by mouth daily       baclofen (LIORESAL) 10 MG tablet Take 1 tablet by mouth nightly (Patient not taking: Reported on 8/27/2021) 30 tablet 1    albuterol sulfate HFA (VENTOLIN HFA) 108 (90 Base) MCG/ACT inhaler Inhale 2 puffs into the lungs 4 times daily as needed for Wheezing (Patient not taking: Reported on 8/27/2021) 1 Inhaler 5     No current facility-administered medications for this visit.        Allergies   Allergen Reactions    Iv Dye [Iodides] Rash    Pcn [Penicillins] Rash    Shellfish Allergy Itching       Family History   Problem Relation Age of Onset    Cancer Mother     Heart Attack Father     Cancer Maternal Grandmother     Heart Attack Paternal Grandmother        Social History     Socioeconomic History    Marital status:      Spouse name: Not on file    Number of children: Not on file    Years of education: Not on file    Highest education level: Not on file   Occupational History    Not on file   Tobacco Use    Smoking status: Never Smoker    Smokeless tobacco: Never Used   Vaping Use    Vaping Use: Never used   Substance and Sexual Activity    Alcohol use: Not Currently     Comment: socially    Drug use: No    Sexual activity: neoplasm of uterine body 2019    Hyperlipidemia 2019    Osteoarthritis of knee 2019    Steatosis of liver 2019       FAMILY HISTORY  No family history on file.     SOCIAL HISTORY  Social History     Socioeconomic History    Marital status:      Spouse name: None    Number of children: None    Years of education: None    Highest education level: None   Occupational History    None   Social Needs    Financial resource strain: None    Food insecurity:     Worry: None     Inability: None    Transportation needs:     Medical: None     Non-medical: None   Tobacco Use    Smoking status: Never Smoker    Smokeless tobacco: Never Used   Substance and Sexual Activity    Alcohol use: No    Drug use: No    Sexual activity: None   Lifestyle    Physical activity:     Days per week: None     Minutes per session: None    Stress: None   Relationships    Social connections:     Talks on phone: None     Gets together: None     Attends Gnosticism service: None     Active member of club or organization: None     Attends meetings of clubs or organizations: None     Relationship status: None    Intimate partner violence:     Fear of current or ex partner: None     Emotionally abused: None     Physically abused: None     Forced sexual activity: None   Other Topics Concern    None   Social History Narrative    None        SURGICAL HISTORY  Past Surgical History:   Procedure Laterality Date     SECTION      COLONOSCOPY      exams x2 - HX: polyps, unsure of dates (endometrial CA)    COLONOSCOPY  2017    Normal exam    HERNIA REPAIR  7313    umbillical    HERNIA REPAIR  0927    umbillical    HYSTERECTOMY  10/03/2016    d/t endometrial CA    TUNNELED VENOUS PORT PLACEMENT Right 10/2016       CURRENT MEDICATIONS  Current Outpatient Medications   Medication Sig Dispense Refill    clobetasol (TEMOVATE) 0.05 % ointment Apply topically 2 times daily as needed Apply to rash under Negative for congestion, ear discharge, ear pain, hearing loss, nosebleeds and tinnitus. Eyes: Negative for photophobia, pain and discharge. Respiratory: Negative for shortness of breath. Cardiovascular: Negative for palpitations and leg swelling. Gastrointestinal: Positive for abdominal pain. Negative for blood in stool, constipation, diarrhea, nausea and vomiting. Endocrine: Negative for polydipsia. Genitourinary: Negative for frequency, hematuria and urgency. Musculoskeletal: Negative for back pain and neck pain. Skin: Negative for rash. Allergic/Immunologic: Negative for environmental allergies. Neurological: Negative for tremors and seizures. Psychiatric/Behavioral: Negative for hallucinations and suicidal ideas. The patient is not nervous/anxious. Physical Exam:  BP (!) 154/84 (Site: Right Upper Arm, Position: Sitting, Cuff Size: Medium Adult)   Pulse 85   Temp 97.7 °F (36.5 °C) (Temporal)   Resp 20   Ht 5' 4\" (1.626 m)   Wt (!) 347 lb 8 oz (157.6 kg)   SpO2 95%   BMI 59.65 kg/m²     PSYCH: mood and affect normal, alert and oriented x 3: No apparent distress, comfortable  EYES: Sclera white, pupils equal round and reactive to light  ENMT:  Hearing normal, trachea midline, ears externally intact  LYMPH: no obvious lympadenopathy in neck. RESP: Respiratory effort was normal with no retractions or use of accessory muscles. CV:  No pedal edema  GI/ Abdomen: Soft, nondistended, nontender, no guarding, no peritoneal signs  MSK: no clubbing/ no cyanosis/ gaitnormal       Assessment/Plan:  Suspicious for Mind Gut phenomem, rule out gallbladder pathology or other causes of abdominal pain  - start elavil 25mg  - CT abdomen and pelvis with IV and PO contrast  - will need prednisone for IV dye allergy  - HIDA to rule out biliary dyskinesia     45 Minutes of which greater than 50% was spent counseling or coordinating her care.     Thank you for the consultation allowing me to take part in Ms. Keller's care.      Elvia Walker M.D.  8/27/2021  8:25 AM

## 2022-12-29 ENCOUNTER — COMMUNITY OUTREACH (OUTPATIENT)
Dept: PRIMARY CARE CLINIC | Age: 57
End: 2022-12-29

## 2023-01-17 ENCOUNTER — OFFICE VISIT (OUTPATIENT)
Dept: BARIATRICS/WEIGHT MGMT | Age: 58
End: 2023-01-17
Payer: COMMERCIAL

## 2023-01-17 VITALS
WEIGHT: 270.8 LBS | TEMPERATURE: 97.8 F | HEIGHT: 63 IN | SYSTOLIC BLOOD PRESSURE: 125 MMHG | BODY MASS INDEX: 47.98 KG/M2 | HEART RATE: 64 BPM | DIASTOLIC BLOOD PRESSURE: 67 MMHG

## 2023-01-17 DIAGNOSIS — E66.01 CLASS 3 SEVERE OBESITY DUE TO EXCESS CALORIES WITHOUT SERIOUS COMORBIDITY WITH BODY MASS INDEX (BMI) OF 60.0 TO 69.9 IN ADULT (HCC): ICD-10-CM

## 2023-01-17 DIAGNOSIS — G47.33 SLEEP APNEA, OBSTRUCTIVE: Primary | ICD-10-CM

## 2023-01-17 PROCEDURE — 1036F TOBACCO NON-USER: CPT | Performed by: INTERNAL MEDICINE

## 2023-01-17 PROCEDURE — 3078F DIAST BP <80 MM HG: CPT | Performed by: INTERNAL MEDICINE

## 2023-01-17 PROCEDURE — 3074F SYST BP LT 130 MM HG: CPT | Performed by: INTERNAL MEDICINE

## 2023-01-17 PROCEDURE — 3017F COLORECTAL CA SCREEN DOC REV: CPT | Performed by: INTERNAL MEDICINE

## 2023-01-17 PROCEDURE — G8417 CALC BMI ABV UP PARAM F/U: HCPCS | Performed by: INTERNAL MEDICINE

## 2023-01-17 PROCEDURE — 99214 OFFICE O/P EST MOD 30 MIN: CPT | Performed by: INTERNAL MEDICINE

## 2023-01-17 PROCEDURE — G8428 CUR MEDS NOT DOCUMENT: HCPCS | Performed by: INTERNAL MEDICINE

## 2023-01-17 PROCEDURE — 99211 OFF/OP EST MAY X REQ PHY/QHP: CPT

## 2023-01-17 PROCEDURE — G8482 FLU IMMUNIZE ORDER/ADMIN: HCPCS | Performed by: INTERNAL MEDICINE

## 2023-01-17 RX ORDER — VENLAFAXINE HYDROCHLORIDE 75 MG/1
75 CAPSULE, EXTENDED RELEASE ORAL DAILY
Qty: 90 CAPSULE | Refills: 2 | Status: SHIPPED | OUTPATIENT
Start: 2023-01-17

## 2023-01-17 NOTE — PROGRESS NOTES
CC -   LUC, Obesity    BACKGROUND -   Last visit: 12/13/22  First visit: 11/18/21    Obesity   Began in childhood  Initial BMI 61.6, Wt 347.6 lbs, Ht 5' 3\"  HS Grad wt 190 lbs   Lowest   wt  165 lbs age 24  Highest  wt  416 lbs  Pattern of wt gain: grad overall, but with sig losses followed by rapid regain  Wt change past yr: gained 40 lbs, then down 35 lbs  Most wt lost: 94 lbs (food logging + exercise)  Other diets attempted: [employer], SF, Meridia, Fad    Desire to lose wt: 10/10  Prob posed by appetite: 7/10    Initial Diet:    Number of meals per day - 4    Number of snacks per day - 6    Meal volume - 12\" plate, sometimes seconds    Fast food/convenience store - 6-8x/week    Restaurants (not fast food) - 1-2x/week   Sweets - 7d/week (8-10 oreos/day or other chocolate snacks/desserts)   Chips - 5-6d/week (8 oz bag)   Crackers/pretzels - 2d/week (2 cups Cheezits)   Nuts - 0d/week   Peanut Butter - 3d/week (4 Tbsp on bread as a meal)   Popcorn - 2d/week (reg sized MW bag)   Dried fruit - 0d/week   Whole fruit - 5-6d/week (1 serving)   Breakfast cereal - 0-1d/week   Granola/Protein/Energy bar - 0d/week   Sugar sweetened beverages - 40 oz reg soda/d, SB Trenta Refresher Tea 4x/wk (190 each)   Protein - No supplements   Fiber - No supplements     Exercise:    Gym membership - none    Walking - none    Running - none    Resistance - none    Aerobic class - none    ______________________    [facility] -  Past Medical History:   Diagnosis Date    Anxiety     Coronary arteriosclerosis     Degenerative joint disease     involving multiple joints    Depression     Hyperlipidemia     Hypothyroidism     MI, old 1998    Obesity     Palpitations     Peptic reflux disease     Sleep apnea     Type 2 diabetes mellitus without complication, without long-term current use of insulin ([facility]) 2/17/2022     Current Outpatient Medications   Medication Sig Dispense Refill    nystatin (MYCOSTATIN) 877706 UNIT/GM powder Apply 3 times daily.  60 g 2 venlafaxine (EFFEXOR XR) 37.5 MG extended release capsule TAKE 1 CAPSULE BY MOUTH EVERY DAY 90 capsule 2    atenolol (TENORMIN) 25 MG tablet TAKE 1 TABLET BY MOUTH THREE TIMES A  tablet 1    metFORMIN (GLUCOPHAGE) 500 MG tablet Take 1 tablet by mouth in the morning and 1 tablet in the evening. Take with meals. 180 tablet 5    torsemide (DEMADEX) 10 MG tablet Take 1 tablet by mouth in the morning. 90 tablet 5    potassium chloride (KLOR-CON M20) 20 MEQ extended release tablet Take 1 tablet by mouth in the morning. 90 tablet 12    amitriptyline (ELAVIL) 25 MG tablet Take 1 tablet by mouth nightly 90 tablet 5    valsartan (DIOVAN) 80 MG tablet Take 1 tablet by mouth daily 90 tablet 5    pantoprazole (PROTONIX) 40 MG tablet Take 1 tablet by mouth daily 90 tablet 3    levothyroxine (SYNTHROID) 150 MCG tablet Take 1 tablet by mouth daily 90 tablet 5    buPROPion (WELLBUTRIN XL) 150 MG extended release tablet Take 1 tablet by mouth every morning 90 tablet 5    simvastatin (ZOCOR) 40 MG tablet Take 1 tablet by mouth nightly 90 tablet 12    aspirin 81 MG chewable tablet Take 81 mg by mouth daily        No current facility-administered medications for this visit. PE -  Gen : /67 (Site: Left Upper Arm, Position: Sitting, Cuff Size: Thigh)   Pulse 64   Temp 97.8 °F (36.6 °C) (Temporal)   Ht 5' 3\" (1.6 m)   Wt 270 lb 12.8 oz (122.8 kg)   BMI 47.97 kg/m²    WN, WD, NAD  Heart:  RRR w/o mgr, no carotid bruits, + LE edema  Lung: Nml resp effort, CTA b/l  Psych: Normal mood   Full affect  Neuro:  Moves all ext well  ______________________    HISTORY & ASSESSMENT/PLAN -     Problem 1 - LUC  HPI  - Diagnosed 2/21/21   Severe   Followed by Dr. Julian Stein   Wears APAP 7d/wk, full duration of sleep   Daytime sleepiness the past week 5/10 (was 5/10) (right before Francisco Javier was changed to Bipap settings which improved her sleep)  Assessment  - Controlled with positive pressure support, excess weight is worsening her underlying airway obstruction and impairing her oxygenation  Plan - Wt reduction per the plan below    Problem 2 - Obesity  HPI - See above Background for description   Weight  Date   347.6 lbs 11/18/21 (had lost 35 lbs prior to seeing me)     11/30/21  349.0 lbs home, started diet on this day   332.0 lbs 12/30/21  332.0 lbs home   321.0 lbs 01/26/22     308.8 lbs 03/03/22  306.4 lbs home   300.0 lbs 04/12/22  298.6 lbs home   286.0 lbs 05/25/22  284.2 lbs home (diagnosed with Covid 5/26/22)   274.2 lbs 06/29/22  273.4 lbs home   265.2 lbs 08/03/22  264.1 lbs home   259.4 lbs 10/11/22  257.4 lbs home   262.2 lbs 11/10/22  259.7 lbs home   262.0 lbs 12/13/22  258.2 lbs home   270.8 lbs 01/17/23  268.8 lbs home (new scales)  Total wt loss to date: 76.8 lbs  DEN = 1830 rachel/d (was initially 1900 rachel/d = 13,300 rachel/wk)  Avg daily energy variance:   11/30/21 - 12/30/21 = - 15.4 lbs/30d = - 1,800 rachel/d deficit   12/30/21 - 01/26/22 = - 11.0 lbs /27d = - 1,426 rachel/d deficit   01/26/22 - 03/03/22 = - 12.4 lbs /36d = - 1,205 rachel/d deficit   03/03/22 - 04/12/22 = -   7.8 lbs/40d = -   682 rachel/d deficit   04/12/22 - 05/25/22 = -  14.4 lbs/43d = - 1,172 rachel/d deficit   05/25/22 - 06/29/22 = -  11.8 lbs/35d = - 1,180 rachel/d deficit   06/29/22 - 08/03/22 = -    9.3 lbs/35d = -    930 rachel/d deficit   08/03/22 - 10/11/22 = -    6.7 lbs/69d = -    340 rachel/d deficit   10/11/22 - 11/10/22 = +   2.3 lbs/30d = +   268 rachel/d excess   11/10/22 - 12/13/22 = -    0.2 lbs/33d = -      21 rachel/d deficit   12/13/22 - 01/17/23 = +   8.8 lbs/34d = +   905 rachel/d excess    Wt effect of HR foods = Restaurant food 1275 rachel/w + Sweets 3,465 + Chips/Crackers/PC 8,240 + SSB 4,120 = 17,100 rachel/wk = 2,442 rachel/d = 128% DEN = 244 lbs/yr    Notes from previous visits: The above numbers for HR foods are inaccurately high. However, they still serve to show that these foods occupy an inordinate percentage of her daily energy needs.  Reduction of them alone, therefore, may be sufficient to produce an acceptable rate of weight loss. She states that she struggles with emotional eating. Her depression is uncontrolled at the present time. Since she also has anxiety, venlafaxine should be added to bupropion. (The dose of amitriptyline is small and therefore the increased risk of serotonin syndrome is very small; I discussed this with her.) My hope is that she will experience some appetite suppression from the venlafaxine. She has a strong family history of early heart disease and some issues in the past with ectopy. Phentermine is therefore not a good choice for her. She preferred venlafaxine over topiramate b/c of the side effect profile (also she is taking a loop diurectic)  Did not tolerate venlafaxine due to poor overall sense of well being. However, would like to try it again. No other options are available other than topiramate.   Retried it at her request on 12/30/21 and she was able to tolerate it    Update:   Calorie monitoring - counting 7d/wk, avg calorie intake 1700 rachel/day (uses My Fitness Pal)  Sweets - 5d/mo  Salt-type snacks - <100 rachel/day  Restaurant food - 1-2x/wk  SSB - none  Fiber - 28 grams/day (includes 1/2 cup Fiber One)  Exercise - walking 1.5mi, 2d//wk (limited by knee pain)  Protein intake - avg 60-65 grams/da (the food logging camila includes macronutrients)  Appetite suppressant -  Venlafaxine 37.5 mg, one tablet every day, 1/10 appetite suppression, Side effects -  + elevation of mood, + dry mouth  Water 60-70 oz daily (96 oz caused nocturia)  Assessment  - Worsened;  cont with present plan without change; cont venlafaxine, but incr dose per her request b/c of decrease in effectiveness; will relax the rule for restaurant to allow for her social engagements over the holidays   Plan   -   Count every calorie every day    Limit calories to 1500 rachel/day (this is a target)    Limit sweets to one day per month    Limit salty snacks to 100 rachel/day    Do not drink any sugar sweetened beverages    Limit restaurant food to once every week    Protein intake should be 65 grams/day    Fiber intake should be 22 grams/day (work up to this amount slowly; use wheat dextrin)    Incr venlafaxine ER to 75 mg one tablet every day.      Make sure that water intake is >64 oz/day    Follow up  See me back in 4-6 weeks      Terrall Dakin, MD  Endocrinology/Obesity  1/17/23

## 2023-01-17 NOTE — PATIENT INSTRUCTIONS
Count every calorie every day    Limit calories to 1500 rachel/day (this is a target)    Limit sweets to one day per month    Limit salty snacks to 100 rachel/day    Do not drink any sugar sweetened beverages    Limit restaurant food to once every week    Protein intake should be 65 grams/day    Fiber intake should be 22 grams/day (work up to this amount slowly; use wheat dextrin)    Incr venlafaxine ER to 75 mg one tablet every day.      Make sure that water intake is >64 oz/day    Follow up  See me back in 4-6 weeks

## 2023-02-19 DIAGNOSIS — I10 ESSENTIAL HYPERTENSION: ICD-10-CM

## 2023-02-20 RX ORDER — ATENOLOL 25 MG/1
TABLET ORAL
Qty: 270 TABLET | Refills: 3 | Status: SHIPPED | OUTPATIENT
Start: 2023-02-20

## 2023-02-22 DIAGNOSIS — E03.9 ACQUIRED HYPOTHYROIDISM: Chronic | ICD-10-CM

## 2023-02-22 DIAGNOSIS — E78.2 MIXED HYPERLIPIDEMIA: Chronic | ICD-10-CM

## 2023-02-22 DIAGNOSIS — F41.9 ANXIETY: ICD-10-CM

## 2023-02-22 DIAGNOSIS — K29.50 OTHER CHRONIC GASTRITIS WITHOUT HEMORRHAGE: ICD-10-CM

## 2023-02-22 DIAGNOSIS — K43.9 VENTRAL HERNIA WITHOUT OBSTRUCTION OR GANGRENE: ICD-10-CM

## 2023-02-23 RX ORDER — PANTOPRAZOLE SODIUM 40 MG/1
40 TABLET, DELAYED RELEASE ORAL DAILY
Qty: 90 TABLET | Refills: 3 | Status: SHIPPED | OUTPATIENT
Start: 2023-02-23 | End: 2024-02-24

## 2023-02-23 RX ORDER — LEVOTHYROXINE SODIUM 0.15 MG/1
150 TABLET ORAL DAILY
Qty: 90 TABLET | Refills: 5 | Status: SHIPPED | OUTPATIENT
Start: 2023-02-23

## 2023-02-23 RX ORDER — SIMVASTATIN 40 MG
40 TABLET ORAL NIGHTLY
Qty: 90 TABLET | Refills: 12 | Status: SHIPPED | OUTPATIENT
Start: 2023-02-23

## 2023-02-23 RX ORDER — BUPROPION HYDROCHLORIDE 150 MG/1
150 TABLET ORAL EVERY MORNING
Qty: 90 TABLET | Refills: 5 | Status: SHIPPED | OUTPATIENT
Start: 2023-02-23

## 2023-03-02 ENCOUNTER — OFFICE VISIT (OUTPATIENT)
Dept: BARIATRICS/WEIGHT MGMT | Age: 58
End: 2023-03-02
Payer: COMMERCIAL

## 2023-03-02 VITALS
HEIGHT: 63 IN | SYSTOLIC BLOOD PRESSURE: 138 MMHG | WEIGHT: 265.2 LBS | TEMPERATURE: 98.4 F | DIASTOLIC BLOOD PRESSURE: 73 MMHG | BODY MASS INDEX: 46.99 KG/M2 | HEART RATE: 58 BPM

## 2023-03-02 DIAGNOSIS — G47.33 SLEEP APNEA, OBSTRUCTIVE: Primary | ICD-10-CM

## 2023-03-02 DIAGNOSIS — E66.01 CLASS 3 SEVERE OBESITY DUE TO EXCESS CALORIES WITHOUT SERIOUS COMORBIDITY WITH BODY MASS INDEX (BMI) OF 60.0 TO 69.9 IN ADULT (HCC): ICD-10-CM

## 2023-03-02 PROCEDURE — 3074F SYST BP LT 130 MM HG: CPT | Performed by: INTERNAL MEDICINE

## 2023-03-02 PROCEDURE — 1036F TOBACCO NON-USER: CPT | Performed by: INTERNAL MEDICINE

## 2023-03-02 PROCEDURE — G8428 CUR MEDS NOT DOCUMENT: HCPCS | Performed by: INTERNAL MEDICINE

## 2023-03-02 PROCEDURE — G8417 CALC BMI ABV UP PARAM F/U: HCPCS | Performed by: INTERNAL MEDICINE

## 2023-03-02 PROCEDURE — 99213 OFFICE O/P EST LOW 20 MIN: CPT | Performed by: INTERNAL MEDICINE

## 2023-03-02 PROCEDURE — G8482 FLU IMMUNIZE ORDER/ADMIN: HCPCS | Performed by: INTERNAL MEDICINE

## 2023-03-02 PROCEDURE — 99211 OFF/OP EST MAY X REQ PHY/QHP: CPT | Performed by: INTERNAL MEDICINE

## 2023-03-02 PROCEDURE — 3017F COLORECTAL CA SCREEN DOC REV: CPT | Performed by: INTERNAL MEDICINE

## 2023-03-02 PROCEDURE — 3078F DIAST BP <80 MM HG: CPT | Performed by: INTERNAL MEDICINE

## 2023-03-02 NOTE — PATIENT INSTRUCTIONS
Count every calorie every day    Limit calories to 1500 rachel/day (this is a target)    Limit sweets to one day per month    Limit salty snacks to 100 rachel/day    Do not drink any sugar sweetened beverages    Limit restaurant food to once every week    Protein intake should be 65 grams/day    Fiber intake should be 22 grams/day (work up to this amount slowly; use wheat dextrin)    Cont venlafaxine ER 75 mg one tablet every day.      Make sure that water intake is >64 oz/day    Follow up  See me back in 4-6 weeks

## 2023-03-02 NOTE — PROGRESS NOTES
obstruction and impairing her oxygenation  Plan - Wt reduction per the plan below    Problem 2 - Obesity  HPI - See above Background for description   Weight  Date   347.6 lbs 11/18/21 (had lost 35 lbs prior to seeing me)     11/30/21  349.0 lbs home, started diet on this day   332.0 lbs 12/30/21  332.0 lbs home   321.0 lbs 01/26/22     308.8 lbs 03/03/22  306.4 lbs home   300.0 lbs 04/12/22  298.6 lbs home   286.0 lbs 05/25/22  284.2 lbs home (diagnosed with Covid 5/26/22)   274.2 lbs 06/29/22  273.4 lbs home   265.2 lbs 08/03/22  264.1 lbs home   259.4 lbs 10/11/22  257.4 lbs home   262.2 lbs 11/10/22  259.7 lbs home   262.0 lbs 12/13/22  258.2 lbs home   270.8 lbs 01/17/23  268.8 lbs home (new scales)   265.2 lbs 03/02/23  263.2 lbs home  Total wt loss to date: 82.4 lbs  DEN = 1830 rachel/d (was initially 1900 rachel/d = 13,300 rachel/wk)  Avg daily energy variance:   11/30/21 - 12/30/21 = - 15.4 lbs/30d = - 1,800 rachel/d deficit   12/30/21 - 01/26/22 = - 11.0 lbs /27d = - 1,426 rachel/d deficit   01/26/22 - 03/03/22 = - 12.4 lbs /36d = - 1,205 rachel/d deficit   03/03/22 - 04/12/22 = -   7.8 lbs/40d = -   682 rachel/d deficit   04/12/22 - 05/25/22 = -  14.4 lbs/43d = - 1,172 rachel/d deficit   05/25/22 - 06/29/22 = -  11.8 lbs/35d = - 1,180 rachel/d deficit   06/29/22 - 08/03/22 = -    9.3 lbs/35d = -    930 rachel/d deficit   08/03/22 - 10/11/22 = -    6.7 lbs/69d = -    340 rachel/d deficit   10/11/22 - 11/10/22 = +   2.3 lbs/30d = +   268 rachel/d excess   11/10/22 - 12/13/22 = -    0.2 lbs/33d = -      21 rachel/d deficit   12/13/22 - 01/17/23 = +   8.8 lbs/34d = +   905 rachel/d excess (increased laxity b/c of holidays, but still counted)   01/17/23 - 03/02/23 = -    5.6 lbs/44d = -    445 rachel/d deficit    Wt effect of HR foods = Restaurant food 1275 rachel/w + Sweets 3,465 + Chips/Crackers/PC 8,240 + SSB 4,120 = 17,100 rachel/wk = 2,442 rachel/d = 128% DEN = 244 lbs/yr    Notes from previous visits: The above numbers for HR foods are inaccurately high.

## 2023-03-30 RX ORDER — AMITRIPTYLINE HYDROCHLORIDE 25 MG/1
25 TABLET, FILM COATED ORAL NIGHTLY
Qty: 90 TABLET | Refills: 5 | Status: SHIPPED | OUTPATIENT
Start: 2023-03-30

## 2023-04-28 ENCOUNTER — OFFICE VISIT (OUTPATIENT)
Dept: BARIATRICS/WEIGHT MGMT | Age: 58
End: 2023-04-28
Payer: COMMERCIAL

## 2023-04-28 VITALS
WEIGHT: 272.2 LBS | HEART RATE: 63 BPM | TEMPERATURE: 97.1 F | BODY MASS INDEX: 48.23 KG/M2 | HEIGHT: 63 IN | DIASTOLIC BLOOD PRESSURE: 97 MMHG | SYSTOLIC BLOOD PRESSURE: 150 MMHG

## 2023-04-28 DIAGNOSIS — E66.01 CLASS 3 SEVERE OBESITY DUE TO EXCESS CALORIES WITHOUT SERIOUS COMORBIDITY WITH BODY MASS INDEX (BMI) OF 60.0 TO 69.9 IN ADULT (HCC): ICD-10-CM

## 2023-04-28 DIAGNOSIS — G47.33 SLEEP APNEA, OBSTRUCTIVE: Primary | ICD-10-CM

## 2023-04-28 PROCEDURE — 3078F DIAST BP <80 MM HG: CPT | Performed by: INTERNAL MEDICINE

## 2023-04-28 PROCEDURE — 99215 OFFICE O/P EST HI 40 MIN: CPT | Performed by: INTERNAL MEDICINE

## 2023-04-28 PROCEDURE — 3074F SYST BP LT 130 MM HG: CPT | Performed by: INTERNAL MEDICINE

## 2023-04-28 PROCEDURE — 1036F TOBACCO NON-USER: CPT | Performed by: INTERNAL MEDICINE

## 2023-04-28 PROCEDURE — G8417 CALC BMI ABV UP PARAM F/U: HCPCS | Performed by: INTERNAL MEDICINE

## 2023-04-28 PROCEDURE — 3017F COLORECTAL CA SCREEN DOC REV: CPT | Performed by: INTERNAL MEDICINE

## 2023-04-28 PROCEDURE — G8428 CUR MEDS NOT DOCUMENT: HCPCS | Performed by: INTERNAL MEDICINE

## 2023-04-28 NOTE — PATIENT INSTRUCTIONS
2021 [facility] [employer] [facility] women's 4x400 meter relay come from behind ( the baton!)    [facility]    The End of Over Eating - [address] [facility] every calorie every day    Limit calories to 1500 rachel/day (this is a target)    Limit sweets to one day per month    Limit salty snacks to 100 rachel/day    Do not drink any sugar sweetened beverages    Limit restaurant food to once every week    Protein intake should be 65 grams/day    Fiber intake should be 22 grams/day (work up to this amount slowly; use wheat dextrin)    Cont venlafaxine ER 75 mg one tablet every day.      Make sure that water intake is >64 oz/day    Follow up  See me back in 4-6 weeks

## 2023-05-18 ENCOUNTER — SCHEDULED TELEPHONE ENCOUNTER (OUTPATIENT)
Dept: BARIATRICS/WEIGHT MGMT | Age: 58
End: 2023-05-18
Payer: COMMERCIAL

## 2023-05-18 DIAGNOSIS — E66.01 CLASS 3 SEVERE OBESITY DUE TO EXCESS CALORIES WITHOUT SERIOUS COMORBIDITY WITH BODY MASS INDEX (BMI) OF 60.0 TO 69.9 IN ADULT (HCC): ICD-10-CM

## 2023-05-18 DIAGNOSIS — G47.33 SLEEP APNEA, OBSTRUCTIVE: Primary | ICD-10-CM

## 2023-05-18 DIAGNOSIS — I10 ESSENTIAL HYPERTENSION: Chronic | ICD-10-CM

## 2023-05-18 PROCEDURE — 99215 OFFICE O/P EST HI 40 MIN: CPT | Performed by: INTERNAL MEDICINE

## 2023-05-18 RX ORDER — VENLAFAXINE HYDROCHLORIDE 75 MG/1
CAPSULE, EXTENDED RELEASE ORAL
Qty: 30 CAPSULE | Refills: 2 | Status: SHIPPED | OUTPATIENT
Start: 2023-05-18

## 2023-05-18 RX ORDER — VALSARTAN 80 MG/1
80 TABLET ORAL DAILY
Qty: 90 TABLET | Refills: 5 | Status: SHIPPED | OUTPATIENT
Start: 2023-05-18

## 2023-05-18 RX ORDER — VENLAFAXINE HYDROCHLORIDE 37.5 MG/1
CAPSULE, EXTENDED RELEASE ORAL
Qty: 30 CAPSULE | Refills: 2 | Status: SHIPPED | OUTPATIENT
Start: 2023-05-18

## 2023-05-18 RX ORDER — SEMAGLUTIDE 0.68 MG/ML
INJECTION, SOLUTION SUBCUTANEOUS
Qty: 6 ML | Refills: 1 | Status: SHIPPED | OUTPATIENT
Start: 2023-05-18

## 2023-05-18 NOTE — PROGRESS NOTES
different ; she is agreeable with getting a new supply of venlafaxine from a different pharmacy; also she would like to start Ozempic (does not have pre-diabetes though); Lastly, she would like me to refill her valsartan for her (b/c of convenience)  Plan   -   Count every calorie every day    Limit calories to 1500 rachel/day (this is a target)    Limit sweets to one day per month    Limit salty snacks to 100 rachel/day    Do not drink any sugar sweetened beverages    Limit restaurant food to once every week    Protein intake should be 65 grams/day    Fiber intake should be 22 grams/day (work up to this amount slowly; use wheat dextrin)    Cont venlafaxine ER 75 mg one tablet every day, however use capsules from a different supplier. If no improvement, then increase by adding a 37.5 mg capsule to it    Start Ozempic by taking 0.25 mg once each week for four weeks, then increase to 0.5 mg once each week    Make sure that water intake is >64 oz/day    Follow up  See me back in 4-6 weeks    Total time spent on the encounter (including documentation): 40 min    An  electronic signature was used to authenticate this note. --Yi De La O MD on 5/18/2023 at 9:08 AM      Pursuant to the emergency declaration under the [facility], [facility] waiver authority and the [employer] General Act, this Virtual  Visit was conducted, with patient's consent, to reduce the patient's risk of exposure to COVID-19 and provide continuity of care for an established patient. Services were provided through a video synchronous discussion virtually to substitute for in-person clinic visit.

## 2023-05-18 NOTE — PATIENT INSTRUCTIONS
Count every calorie every day    Limit calories to 1500 rachel/day (this is a target)    Limit sweets to one day per month    Limit salty snacks to 100 rachel/day    Do not drink any sugar sweetened beverages    Limit restaurant food to once every week    Protein intake should be 65 grams/day    Fiber intake should be 22 grams/day (work up to this amount slowly; use wheat dextrin)    Cont venlafaxine ER 75 mg one tablet every day, however use capsules from a different supplier.  If no improvement, then increase by adding a 37.5 mg capsule to it    Start Ozempic by taking 0.25 mg once each week for four weeks, then increase to 0.5 mg once each week    Make sure that water intake is >64 oz/day    Follow up  See me back in 4-6 weeks

## 2023-06-22 ENCOUNTER — OFFICE VISIT (OUTPATIENT)
Dept: BARIATRICS/WEIGHT MGMT | Age: 58
End: 2023-06-22
Payer: COMMERCIAL

## 2023-06-22 VITALS
HEIGHT: 63 IN | WEIGHT: 282.2 LBS | DIASTOLIC BLOOD PRESSURE: 78 MMHG | BODY MASS INDEX: 50 KG/M2 | HEART RATE: 77 BPM | SYSTOLIC BLOOD PRESSURE: 136 MMHG | TEMPERATURE: 97.7 F

## 2023-06-22 DIAGNOSIS — G47.33 SLEEP APNEA, OBSTRUCTIVE: Primary | ICD-10-CM

## 2023-06-22 DIAGNOSIS — E66.01 CLASS 3 SEVERE OBESITY DUE TO EXCESS CALORIES WITHOUT SERIOUS COMORBIDITY WITH BODY MASS INDEX (BMI) OF 60.0 TO 69.9 IN ADULT (HCC): ICD-10-CM

## 2023-06-22 PROCEDURE — G8417 CALC BMI ABV UP PARAM F/U: HCPCS | Performed by: INTERNAL MEDICINE

## 2023-06-22 PROCEDURE — 1036F TOBACCO NON-USER: CPT | Performed by: INTERNAL MEDICINE

## 2023-06-22 PROCEDURE — G8428 CUR MEDS NOT DOCUMENT: HCPCS | Performed by: INTERNAL MEDICINE

## 2023-06-22 PROCEDURE — 99215 OFFICE O/P EST HI 40 MIN: CPT | Performed by: INTERNAL MEDICINE

## 2023-06-22 PROCEDURE — 3074F SYST BP LT 130 MM HG: CPT | Performed by: INTERNAL MEDICINE

## 2023-06-22 PROCEDURE — 3078F DIAST BP <80 MM HG: CPT | Performed by: INTERNAL MEDICINE

## 2023-06-22 PROCEDURE — 3017F COLORECTAL CA SCREEN DOC REV: CPT | Performed by: INTERNAL MEDICINE

## 2023-07-27 ENCOUNTER — OFFICE VISIT (OUTPATIENT)
Dept: BARIATRICS/WEIGHT MGMT | Age: 58
End: 2023-07-27
Payer: COMMERCIAL

## 2023-07-27 VITALS
HEIGHT: 63 IN | BODY MASS INDEX: 50.18 KG/M2 | DIASTOLIC BLOOD PRESSURE: 59 MMHG | HEART RATE: 67 BPM | TEMPERATURE: 97.4 F | WEIGHT: 283.2 LBS | SYSTOLIC BLOOD PRESSURE: 104 MMHG

## 2023-07-27 DIAGNOSIS — G47.33 SLEEP APNEA, OBSTRUCTIVE: Primary | ICD-10-CM

## 2023-07-27 DIAGNOSIS — E66.01 CLASS 3 SEVERE OBESITY DUE TO EXCESS CALORIES WITHOUT SERIOUS COMORBIDITY WITH BODY MASS INDEX (BMI) OF 60.0 TO 69.9 IN ADULT (HCC): ICD-10-CM

## 2023-07-27 DIAGNOSIS — R11.0 NAUSEA: ICD-10-CM

## 2023-07-27 PROCEDURE — 3017F COLORECTAL CA SCREEN DOC REV: CPT | Performed by: INTERNAL MEDICINE

## 2023-07-27 PROCEDURE — 3074F SYST BP LT 130 MM HG: CPT | Performed by: INTERNAL MEDICINE

## 2023-07-27 PROCEDURE — G8417 CALC BMI ABV UP PARAM F/U: HCPCS | Performed by: INTERNAL MEDICINE

## 2023-07-27 PROCEDURE — G8428 CUR MEDS NOT DOCUMENT: HCPCS | Performed by: INTERNAL MEDICINE

## 2023-07-27 PROCEDURE — 99214 OFFICE O/P EST MOD 30 MIN: CPT | Performed by: INTERNAL MEDICINE

## 2023-07-27 PROCEDURE — 1036F TOBACCO NON-USER: CPT | Performed by: INTERNAL MEDICINE

## 2023-07-27 PROCEDURE — 3078F DIAST BP <80 MM HG: CPT | Performed by: INTERNAL MEDICINE

## 2023-07-27 PROCEDURE — 99211 OFF/OP EST MAY X REQ PHY/QHP: CPT

## 2023-07-27 RX ORDER — ONDANSETRON 4 MG/1
TABLET, ORALLY DISINTEGRATING ORAL
Qty: 30 TABLET | Refills: 1 | Status: SHIPPED | OUTPATIENT
Start: 2023-07-27

## 2023-07-27 NOTE — PROGRESS NOTES
underlying airway obstruction and impairing her oxygenation  Plan - Wt reduction per the plan below    Problem 2 - Obesity  HPI - See above Background for description   Weight  Date   347.6 lbs 21 (had lost 35 lbs prior to seeing me)     21  349.0 lbs home, started diet on this day   332.0 lbs 21  332.0 lbs home   321.0 lbs 22     308.8 lbs 22  306.4 lbs home   300.0 lbs 22  298.6 lbs home   286.0 lbs 22  284.2 lbs home (diagnosed with Covid 22)   274.2 lbs 22  273.4 lbs home   265.2 lbs 22  264.1 lbs home   259.4 lbs 10/11/22  257.4 lbs home   262.2 lbs 11/10/22  259.7 lbs home   262.0 lbs 22  258.2 lbs home   270.8 lbs 23  268.8 lbs home (new scales)   265.2 lbs 23  263.2 lbs home  Her dog  2/3/23. Had Norovirus (GI) 23 and Covid 2/10/23.    272.2 lbs 23  269.7 lbs home   -------  23  273.8 lbs home   282.2 lbs 23  279.9 lbs home Poor sleep, Sister from [address] [place] [address] stayed for three weeks.  The sister likes to indulge in food while in town   283.2 lbs 23  280.0 lbs home  Total wt loss to date: 64.4 lbs  DEN = 1830 rachel/d (was initially 1900 rachel/d = 13,300 rachel/wk)  Avg daily energy variance:   21 - 21 = - 15.4 lbs/30d = - 1,800 rachel/d deficit   21 - 22 = - 11.0 lbs /27d = - 1,426 rachel/d deficit   22 - 22 = - 12.4 lbs /36d = - 1,205 rachel/d deficit   22 - 22 = -   7.8 lbs/40d = -   682 rachel/d deficit   22 - 22 = -  14.4 lbs/43d = - 1,172 rachel/d deficit   22 - 22 = -  11.8 lbs/35d = - 1,180 rachel/d deficit   22 - 22 = -    9.3 lbs/35d = -    930 rachel/d deficit   22 - 10/11/22 = -    6.7 lbs/69d = -    340 rachel/d deficit   10/11/22 - 11/10/22 = +   2.3 lbs/30d = +   268 rachel/d excess   11/10/22 - 22 = -    0.2 lbs/33d = -      21 rachel/d deficit   22 - 23 = +   8.8 lbs/34d = +   905 rachel/d excess (increased laxity b/c of holidays,

## 2023-07-27 NOTE — PATIENT INSTRUCTIONS
Count every calorie every day    Limit calories to 1500 rachel/day (this is a target)    Limit sweets to one day per month    Limit salty snacks to 100 rachel/day    Do not drink any sugar sweetened beverages    Limit restaurant food to once every week    Protein intake should be 65 grams/day    Fiber intake should be 22 grams/day (work up to this amount slowly; use wheat dextrin)    Cont venlafaxine ER 75 mg one tablet every day    Cont Ozempic 0.5 mg once each week    Make sure that water intake is >64 oz/day    Follow up  See me back in 4-6 weeks

## 2023-08-22 DIAGNOSIS — I10 ESSENTIAL HYPERTENSION: Chronic | ICD-10-CM

## 2023-08-22 DIAGNOSIS — I25.10 CORONARY ARTERIOSCLEROSIS: ICD-10-CM

## 2023-08-22 DIAGNOSIS — E03.9 ACQUIRED HYPOTHYROIDISM: ICD-10-CM

## 2023-08-22 LAB
ABSOLUTE IMMATURE GRANULOCYTE: 0.03 K/UL (ref 0–0.58)
ALBUMIN SERPL-MCNC: 4.2 G/DL (ref 3.5–5.2)
ALP BLD-CCNC: 73 U/L (ref 35–104)
ALT SERPL-CCNC: 10 U/L (ref 0–32)
ANION GAP SERPL CALCULATED.3IONS-SCNC: 17 MMOL/L (ref 7–16)
AST SERPL-CCNC: 14 U/L (ref 0–31)
BASOPHILS ABSOLUTE: 0.03 K/UL (ref 0–0.2)
BASOPHILS RELATIVE PERCENT: 1 % (ref 0–2)
BILIRUB SERPL-MCNC: 0.5 MG/DL (ref 0–1.2)
BUN BLDV-MCNC: 9 MG/DL (ref 6–20)
CALCIUM SERPL-MCNC: 9.4 MG/DL (ref 8.6–10.2)
CHLORIDE BLD-SCNC: 101 MMOL/L (ref 98–107)
CHOLESTEROL: 188 MG/DL
CO2: 24 MMOL/L (ref 22–29)
CREAT SERPL-MCNC: 0.9 MG/DL (ref 0.5–1)
EOSINOPHILS ABSOLUTE: 0.16 K/UL (ref 0.05–0.5)
EOSINOPHILS RELATIVE PERCENT: 3 % (ref 0–6)
GFR SERPL CREATININE-BSD FRML MDRD: >60 ML/MIN/1.73M2
GLUCOSE BLD-MCNC: 82 MG/DL (ref 74–99)
HCT VFR BLD CALC: 39.3 % (ref 34–48)
HDLC SERPL-MCNC: 58 MG/DL
HEMOGLOBIN: 11.7 G/DL (ref 11.5–15.5)
IMMATURE GRANULOCYTES: 1 % (ref 0–5)
LDL CHOLESTEROL: 84 MG/DL
LYMPHOCYTES ABSOLUTE: 1.71 K/UL (ref 1.5–4)
LYMPHOCYTES RELATIVE PERCENT: 30 % (ref 20–42)
MCH RBC QN AUTO: 22.9 PG (ref 26–35)
MCHC RBC AUTO-ENTMCNC: 29.8 G/DL (ref 32–34.5)
MCV RBC AUTO: 77.1 FL (ref 80–99.9)
MONOCYTES ABSOLUTE: 0.39 K/UL (ref 0.1–0.95)
MONOCYTES RELATIVE PERCENT: 7 % (ref 2–12)
NEUTROPHILS ABSOLUTE: 3.47 K/UL (ref 1.8–7.3)
NEUTROPHILS RELATIVE PERCENT: 60 % (ref 43–80)
PDW BLD-RTO: 15.9 % (ref 11.5–15)
PLATELET # BLD: 219 K/UL (ref 130–450)
PMV BLD AUTO: 10.6 FL (ref 7–12)
POTASSIUM SERPL-SCNC: 4.5 MMOL/L (ref 3.5–5)
RBC # BLD: 5.1 M/UL (ref 3.5–5.5)
SODIUM BLD-SCNC: 142 MMOL/L (ref 132–146)
T3 TOTAL: 103 NG/DL (ref 80–200)
T4 TOTAL: 8.6 UG/DL (ref 4.5–11.7)
TOTAL PROTEIN: 7 G/DL (ref 6.4–8.3)
TRIGL SERPL-MCNC: 229 MG/DL
TSH SERPL DL<=0.05 MIU/L-ACNC: 2.39 UIU/ML (ref 0.27–4.2)
VLDLC SERPL CALC-MCNC: 46 MG/DL
WBC # BLD: 5.8 K/UL (ref 4.5–11.5)

## 2023-08-23 ENCOUNTER — TELEPHONE (OUTPATIENT)
Dept: PRIMARY CARE CLINIC | Age: 58
End: 2023-08-23

## 2023-08-23 ENCOUNTER — OFFICE VISIT (OUTPATIENT)
Dept: PRIMARY CARE CLINIC | Age: 58
End: 2023-08-23
Payer: COMMERCIAL

## 2023-08-23 VITALS
SYSTOLIC BLOOD PRESSURE: 142 MMHG | WEIGHT: 293 LBS | BODY MASS INDEX: 51.91 KG/M2 | HEIGHT: 63 IN | TEMPERATURE: 98.7 F | DIASTOLIC BLOOD PRESSURE: 83 MMHG

## 2023-08-23 DIAGNOSIS — E78.2 MIXED HYPERLIPIDEMIA: Chronic | ICD-10-CM

## 2023-08-23 DIAGNOSIS — R11.0 NAUSEA: ICD-10-CM

## 2023-08-23 DIAGNOSIS — E55.9 VITAMIN D DEFICIENCY: ICD-10-CM

## 2023-08-23 DIAGNOSIS — I10 ESSENTIAL HYPERTENSION: Chronic | ICD-10-CM

## 2023-08-23 DIAGNOSIS — E11.9 TYPE 2 DIABETES MELLITUS WITHOUT COMPLICATION, WITHOUT LONG-TERM CURRENT USE OF INSULIN (HCC): ICD-10-CM

## 2023-08-23 DIAGNOSIS — G47.33 SLEEP APNEA, OBSTRUCTIVE: Chronic | ICD-10-CM

## 2023-08-23 DIAGNOSIS — E03.9 ACQUIRED HYPOTHYROIDISM: ICD-10-CM

## 2023-08-23 DIAGNOSIS — Z00.01 ENCOUNTER FOR ANNUAL GENERAL MEDICAL EXAMINATION WITH ABNORMAL FINDINGS IN ADULT: Primary | ICD-10-CM

## 2023-08-23 LAB — HBA1C MFR BLD: 5.3 %

## 2023-08-23 PROCEDURE — 3079F DIAST BP 80-89 MM HG: CPT | Performed by: FAMILY MEDICINE

## 2023-08-23 PROCEDURE — 99396 PREV VISIT EST AGE 40-64: CPT | Performed by: FAMILY MEDICINE

## 2023-08-23 PROCEDURE — 3077F SYST BP >= 140 MM HG: CPT | Performed by: FAMILY MEDICINE

## 2023-08-23 PROCEDURE — 83037 HB GLYCOSYLATED A1C HOME DEV: CPT | Performed by: FAMILY MEDICINE

## 2023-08-23 RX ORDER — ONDANSETRON 4 MG/1
TABLET, ORALLY DISINTEGRATING ORAL
Qty: 30 TABLET | Refills: 5 | Status: SHIPPED
Start: 2023-08-23 | End: 2023-08-24 | Stop reason: SDUPTHER

## 2023-08-23 RX ORDER — TORSEMIDE 10 MG/1
10 TABLET ORAL DAILY
Qty: 90 TABLET | Refills: 5 | Status: SHIPPED | OUTPATIENT
Start: 2023-08-23

## 2023-08-23 RX ORDER — POTASSIUM CHLORIDE 20 MEQ/1
20 TABLET, EXTENDED RELEASE ORAL DAILY
Qty: 90 TABLET | Refills: 12 | Status: SHIPPED | OUTPATIENT
Start: 2023-08-23

## 2023-08-23 SDOH — ECONOMIC STABILITY: HOUSING INSECURITY
IN THE LAST 12 MONTHS, WAS THERE A TIME WHEN YOU DID NOT HAVE A STEADY PLACE TO SLEEP OR SLEPT IN A SHELTER (INCLUDING NOW)?: NO

## 2023-08-23 SDOH — ECONOMIC STABILITY: FOOD INSECURITY: WITHIN THE PAST 12 MONTHS, YOU WORRIED THAT YOUR FOOD WOULD RUN OUT BEFORE YOU GOT MONEY TO BUY MORE.: NEVER TRUE

## 2023-08-23 SDOH — ECONOMIC STABILITY: INCOME INSECURITY: HOW HARD IS IT FOR YOU TO PAY FOR THE VERY BASICS LIKE FOOD, HOUSING, MEDICAL CARE, AND HEATING?: NOT HARD AT ALL

## 2023-08-23 SDOH — ECONOMIC STABILITY: FOOD INSECURITY: WITHIN THE PAST 12 MONTHS, THE FOOD YOU BOUGHT JUST DIDN'T LAST AND YOU DIDN'T HAVE MONEY TO GET MORE.: NEVER TRUE

## 2023-08-23 ASSESSMENT — PATIENT HEALTH QUESTIONNAIRE - PHQ9
1. LITTLE INTEREST OR PLEASURE IN DOING THINGS: 0
2. FEELING DOWN, DEPRESSED OR HOPELESS: 0
SUM OF ALL RESPONSES TO PHQ QUESTIONS 1-9: 0
SUM OF ALL RESPONSES TO PHQ9 QUESTIONS 1 & 2: 0
SUM OF ALL RESPONSES TO PHQ QUESTIONS 1-9: 0

## 2023-08-23 ASSESSMENT — ENCOUNTER SYMPTOMS
RESPIRATORY NEGATIVE: 1
GASTROINTESTINAL NEGATIVE: 1
EYES NEGATIVE: 1
ALLERGIC/IMMUNOLOGIC NEGATIVE: 1

## 2023-08-23 NOTE — TELEPHONE ENCOUNTER
Pharmacy calling for clarification on directions.      ondansetron (ZOFRAN-ODT) 4 MG disintegrating tablet 30 tablet 5 8/23/2023     Sig: Take one day each week as needed

## 2023-08-24 RX ORDER — ONDANSETRON 4 MG/1
4 TABLET, ORALLY DISINTEGRATING ORAL EVERY 12 HOURS PRN
Qty: 50 TABLET | Refills: 5 | Status: SHIPPED | OUTPATIENT
Start: 2023-08-24

## 2023-09-20 ENCOUNTER — TELEPHONE (OUTPATIENT)
Dept: HEMATOLOGY | Age: 58
End: 2023-09-20

## 2023-09-20 DIAGNOSIS — K76.0 NAFLD (NONALCOHOLIC FATTY LIVER DISEASE): Primary | ICD-10-CM

## 2023-09-20 NOTE — TELEPHONE ENCOUNTER
MRI Abdomen   Approved through [address]   Auth# [id number]  Exp 10/15/23    Called and LVM requesting a return call to clinic regarding scheduled MRI. [facility] 10/9/23, arrive 1230pm, scan 1pm, NPO 4 Hours. Pt will need new labs completed prior to scan. Orders in [address] [place] [address].

## 2023-09-28 ENCOUNTER — OFFICE VISIT (OUTPATIENT)
Dept: BARIATRICS/WEIGHT MGMT | Age: 58
End: 2023-09-28
Payer: COMMERCIAL

## 2023-09-28 VITALS
DIASTOLIC BLOOD PRESSURE: 65 MMHG | BODY MASS INDEX: 51.77 KG/M2 | SYSTOLIC BLOOD PRESSURE: 123 MMHG | HEIGHT: 63 IN | HEART RATE: 65 BPM | WEIGHT: 292.2 LBS | TEMPERATURE: 97.3 F

## 2023-09-28 DIAGNOSIS — G47.33 SLEEP APNEA, OBSTRUCTIVE: Primary | ICD-10-CM

## 2023-09-28 DIAGNOSIS — E66.01 CLASS 3 SEVERE OBESITY DUE TO EXCESS CALORIES WITHOUT SERIOUS COMORBIDITY WITH BODY MASS INDEX (BMI) OF 60.0 TO 69.9 IN ADULT (HCC): ICD-10-CM

## 2023-09-28 PROCEDURE — 3078F DIAST BP <80 MM HG: CPT | Performed by: INTERNAL MEDICINE

## 2023-09-28 PROCEDURE — 3017F COLORECTAL CA SCREEN DOC REV: CPT | Performed by: INTERNAL MEDICINE

## 2023-09-28 PROCEDURE — 99215 OFFICE O/P EST HI 40 MIN: CPT | Performed by: INTERNAL MEDICINE

## 2023-09-28 PROCEDURE — 1036F TOBACCO NON-USER: CPT | Performed by: INTERNAL MEDICINE

## 2023-09-28 PROCEDURE — 99211 OFF/OP EST MAY X REQ PHY/QHP: CPT | Performed by: INTERNAL MEDICINE

## 2023-09-28 PROCEDURE — G8428 CUR MEDS NOT DOCUMENT: HCPCS | Performed by: INTERNAL MEDICINE

## 2023-09-28 PROCEDURE — G8417 CALC BMI ABV UP PARAM F/U: HCPCS | Performed by: INTERNAL MEDICINE

## 2023-09-28 PROCEDURE — 3074F SYST BP LT 130 MM HG: CPT | Performed by: INTERNAL MEDICINE

## 2023-09-28 RX ORDER — SEMAGLUTIDE 0.68 MG/ML
INJECTION, SOLUTION SUBCUTANEOUS
Qty: 6 ML | Refills: 1 | Status: SHIPPED | OUTPATIENT
Start: 2023-09-28

## 2023-09-28 NOTE — PATIENT INSTRUCTIONS
Summary of Weight Change:   Weight  Date   347.6 lbs 21 (had lost 35 lbs prior to seeing me)     21  349.0 lbs home, started diet on this day   332.0 lbs 21  332.0 lbs home   321.0 lbs 22     308.8 lbs 22  306.4 lbs home   300.0 lbs 22  298.6 lbs home   286.0 lbs 22  284.2 lbs home (diagnosed with Covid 22)   274.2 lbs 22  273.4 lbs home   265.2 lbs 22  264.1 lbs home   259.4 lbs 10/11/22  257.4 lbs home   262.2 lbs 11/10/22  259.7 lbs home   262.0 lbs 22  258.2 lbs home   270.8 lbs 23  268.8 lbs home (new scales)   265.2 lbs 23  263.2 lbs home  Her dog  2/3/23. Had Norovirus (GI) 23 and Covid 2/10/23.    272.2 lbs 23  269.7 lbs home   -------  23  273.8 lbs home   282.2 lbs 23  279.9 lbs home Poor sleep, Sister from [address] [place] [address] stayed for three weeks.  The sister likes to indulge in food while in town   283.2 lbs 23  280.0 lbs home   292.3 lbs 23  289.6 lbs home  Total wt loss to date: 55.3 lbs    DEN = 1830 rachel/d    Avg daily energy variance:   21 - 21 = - 15.4 lbs/30d = - 1,800 rachel/d deficit   21 - 22 = - 11.0 lbs /27d = - 1,426 rachel/d deficit   22 - 22 = - 12.4 lbs /36d = - 1,205 rachel/d deficit   22 - 22 = -   7.8 lbs/40d = -   682 rachel/d deficit   22 - 22 = -  14.4 lbs/43d = - 1,172 rachel/d deficit   22 - 22 = -  11.8 lbs/35d = - 1,180 rachel/d deficit   22 - 22 = -    9.3 lbs/35d = -    930 rachel/d deficit   22 - 10/11/22 = -    6.7 lbs/69d = -    340 rachel/d deficit   10/11/22 - 11/10/22 = +   2.3 lbs/30d = +   268 rachel/d excess   11/10/22 - 22 = -    0.2 lbs/33d = -      21 rachel/d deficit   22 - 23 = +   8.8 lbs/34d = +   905 rachel/d excess (increased laxity b/c of holidays, but still counted)   23 - 23 = -    5.6 lbs/44d = -    445 rachel/d deficit   23 - 23 = +   6.5 lbs/57d = +   399 rachel/d

## 2023-09-28 NOTE — PROGRESS NOTES
CC:  LUC, Obesity    BACKGROUND -   Last visit: 07/27/23  First visit: 11/18/21    Obesity   Began in childhood  Initial BMI 61.6, Wt 347.6 lbs, Ht 5' 3\"  HS Grad wt 190 lbs   Lowest   wt  165 lbs age 24  Highest  wt  416 lbs  Pattern of wt gain: grad overall, but with sig losses followed by rapid regain  Wt change past yr: gained 40 lbs, then down 35 lbs  Most wt lost: 94 lbs (food logging + exercise)  Other diets attempted: [employer], SF, Meridia, Fad    Desire to lose wt: 10/10  Prob posed by appetite: 7/10    Initial Diet:    Number of meals per day - 4    Number of snacks per day - 6    Meal volume - 12\" plate, sometimes seconds    Fast food/convenience store - 6-8x/week    Restaurants (not fast food) - 1-2x/week   Sweets - 7d/week (8-10 oreos/day or other chocolate snacks/desserts)   Chips - 5-6d/week (8 oz bag)   Crackers/pretzels - 2d/week (2 cups Cheezits)   Nuts - 0d/week   Peanut Butter - 3d/week (4 Tbsp on bread as a meal)   Popcorn - 2d/week (reg sized MW bag)   Dried fruit - 0d/week   Whole fruit - 5-6d/week (1 serving)   Breakfast cereal - 0-1d/week   Granola/Protein/Energy bar - 0d/week   Sugar sweetened beverages - 40 oz reg soda/d, SB Trenta Refresher Tea 4x/wk (190 each)   Protein - No supplements   Fiber - No supplements     Exercise:    Gym membership - none    Walking - none    Running - none    Resistance - none    Aerobic class - none    ______________________    [facility] -  Past Medical History:   Diagnosis Date    Anxiety     Coronary arteriosclerosis     Degenerative joint disease     involving multiple joints    Depression     Hyperlipidemia     Hypothyroidism     MI, old 1998    Obesity     Palpitations     Peptic reflux disease     Sleep apnea     Type 2 diabetes mellitus without complication, without long-term current use of insulin ([facility]) 2/17/2022     Current Outpatient Medications   Medication Sig Dispense Refill    amitriptyline (ELAVIL) 25 MG tablet Take 1 tablet by mouth nightly 90 tablet 5

## 2023-10-30 DIAGNOSIS — E66.01 CLASS 3 SEVERE OBESITY DUE TO EXCESS CALORIES WITHOUT SERIOUS COMORBIDITY WITH BODY MASS INDEX (BMI) OF 60.0 TO 69.9 IN ADULT (HCC): ICD-10-CM

## 2024-01-01 RX ORDER — VENLAFAXINE HYDROCHLORIDE 75 MG/1
CAPSULE, EXTENDED RELEASE ORAL
Qty: 90 CAPSULE | Refills: 2 | Status: SHIPPED | OUTPATIENT
Start: 2024-01-01

## 2024-01-31 ENCOUNTER — OFFICE VISIT (OUTPATIENT)
Dept: BARIATRICS/WEIGHT MGMT | Age: 59
End: 2024-01-31
Payer: COMMERCIAL

## 2024-01-31 VITALS
DIASTOLIC BLOOD PRESSURE: 68 MMHG | HEIGHT: 63 IN | HEART RATE: 63 BPM | SYSTOLIC BLOOD PRESSURE: 157 MMHG | BODY MASS INDEX: 51.91 KG/M2 | WEIGHT: 293 LBS | TEMPERATURE: 97.2 F

## 2024-01-31 DIAGNOSIS — E66.01 CLASS 3 SEVERE OBESITY DUE TO EXCESS CALORIES WITHOUT SERIOUS COMORBIDITY WITH BODY MASS INDEX (BMI) OF 60.0 TO 69.9 IN ADULT (HCC): ICD-10-CM

## 2024-01-31 DIAGNOSIS — G47.33 SLEEP APNEA, OBSTRUCTIVE: Primary | ICD-10-CM

## 2024-01-31 PROCEDURE — 1036F TOBACCO NON-USER: CPT | Performed by: INTERNAL MEDICINE

## 2024-01-31 PROCEDURE — 3078F DIAST BP <80 MM HG: CPT | Performed by: INTERNAL MEDICINE

## 2024-01-31 PROCEDURE — 99211 OFF/OP EST MAY X REQ PHY/QHP: CPT

## 2024-01-31 PROCEDURE — G8428 CUR MEDS NOT DOCUMENT: HCPCS | Performed by: INTERNAL MEDICINE

## 2024-01-31 PROCEDURE — 3077F SYST BP >= 140 MM HG: CPT | Performed by: INTERNAL MEDICINE

## 2024-01-31 PROCEDURE — 99215 OFFICE O/P EST HI 40 MIN: CPT | Performed by: INTERNAL MEDICINE

## 2024-01-31 PROCEDURE — G8484 FLU IMMUNIZE NO ADMIN: HCPCS | Performed by: INTERNAL MEDICINE

## 2024-01-31 PROCEDURE — G8417 CALC BMI ABV UP PARAM F/U: HCPCS | Performed by: INTERNAL MEDICINE

## 2024-01-31 PROCEDURE — 3017F COLORECTAL CA SCREEN DOC REV: CPT | Performed by: INTERNAL MEDICINE

## 2024-01-31 RX ORDER — VENLAFAXINE HYDROCHLORIDE 75 MG/1
150 CAPSULE, EXTENDED RELEASE ORAL DAILY
Qty: 90 CAPSULE | Refills: 3 | Status: SHIPPED | OUTPATIENT
Start: 2024-01-31

## 2024-01-31 RX ORDER — VENLAFAXINE HYDROCHLORIDE 37.5 MG/1
37.5 CAPSULE, EXTENDED RELEASE ORAL DAILY
Qty: 90 CAPSULE | Refills: 3 | Status: SHIPPED | OUTPATIENT
Start: 2024-01-31

## 2024-01-31 NOTE — PATIENT INSTRUCTIONS
Count every calorie every day    Limit calories to 1500 rachel/day (this is a target)    Limit sweets to one day per month    Limit salty snacks to 100 rachel/day    Do not drink any sugar sweetened beverages    Limit restaurant food to once every week    Protein intake should be 75 grams/day    Fiber intake should be 22 grams/day (work up to this amount slowly; use wheat dextrin)    Increase venlafaxine ER to one 75 mg tablet + one 37.5 mg tablet every day     Make sure that water intake is >64 oz/day    Follow up  See me back in 2 weeks

## 2024-01-31 NOTE — PROGRESS NOTES
CC:  LUC, Obesity    BACKGROUND -   Last visit: 09/28/23  First visit: 11/18/21    Obesity   Began in childhood  Initial BMI 61.6, Wt 347.6 lbs, Ht 5' 3\"  HS Grad wt 190 lbs   Lowest   wt  165 lbs age 23  Highest  wt  416 lbs  Pattern of wt gain: grad overall, but with sig losses followed by rapid regain  Wt change past yr: gained 40 lbs, then down 35 lbs  Most wt lost: 94 lbs (food logging + exercise)  Other diets attempted: WW, SF, Meridia, Fad    Desire to lose wt: 10/10  Prob posed by appetite: 7/10    Initial Diet:    Number of meals per day - 4    Number of snacks per day - 6    Meal volume - 12\" plate, sometimes seconds    Fast food/convenience store - 6-8x/week    Restaurants (not fast food) - 1-2x/week   Sweets - 7d/week (8-10 oreos/day or other chocolate snacks/desserts)   Chips - 5-6d/week (8 oz bag)   Crackers/pretzels - 2d/week (2 cups Cheezits)   Nuts - 0d/week   Peanut Butter - 3d/week (4 Tbsp on bread as a meal)   Popcorn - 2d/week (reg sized MW bag)   Dried fruit - 0d/week   Whole fruit - 5-6d/week (1 serving)   Breakfast cereal - 0-1d/week   Granola/Protein/Energy bar - 0d/week   Sugar sweetened beverages - 40 oz reg soda/d, SB Trenta Refresher Tea 4x/wk (190 each)   Protein - No supplements   Fiber - No supplements     Exercise:    Gym membership - none    Walking - none    Running - none    Resistance - none    Aerobic class - none    ______________________    [facility] -  Past Medical History:   Diagnosis Date    Anxiety     Coronary arteriosclerosis     Degenerative joint disease     involving multiple joints    Depression     Hyperlipidemia     Hypothyroidism     MI, old 1998    Obesity     Palpitations     Peptic reflux disease     Sleep apnea     Type 2 diabetes mellitus without complication, without long-term current use of insulin ([facility]) 2/17/2022     Current Outpatient Medications   Medication Sig Dispense Refill    venlafaxine (EFFEXOR XR) 75 MG extended release capsule Take 2 capsules by

## 2024-02-15 DIAGNOSIS — I10 ESSENTIAL HYPERTENSION: ICD-10-CM

## 2024-02-15 RX ORDER — ATENOLOL 25 MG/1
TABLET ORAL
Qty: 270 TABLET | Refills: 0 | Status: SHIPPED | OUTPATIENT
Start: 2024-02-15

## 2024-02-16 ENCOUNTER — OFFICE VISIT (OUTPATIENT)
Dept: BARIATRICS/WEIGHT MGMT | Age: 59
End: 2024-02-16
Payer: COMMERCIAL

## 2024-02-16 VITALS
TEMPERATURE: 97.1 F | DIASTOLIC BLOOD PRESSURE: 63 MMHG | WEIGHT: 293 LBS | SYSTOLIC BLOOD PRESSURE: 136 MMHG | BODY MASS INDEX: 51.91 KG/M2 | HEART RATE: 59 BPM | HEIGHT: 63 IN

## 2024-02-16 DIAGNOSIS — G47.33 SLEEP APNEA, OBSTRUCTIVE: Primary | ICD-10-CM

## 2024-02-16 DIAGNOSIS — K29.50 OTHER CHRONIC GASTRITIS WITHOUT HEMORRHAGE: ICD-10-CM

## 2024-02-16 DIAGNOSIS — E66.01 CLASS 3 SEVERE OBESITY DUE TO EXCESS CALORIES WITHOUT SERIOUS COMORBIDITY WITH BODY MASS INDEX (BMI) OF 60.0 TO 69.9 IN ADULT (HCC): ICD-10-CM

## 2024-02-16 PROCEDURE — G8417 CALC BMI ABV UP PARAM F/U: HCPCS | Performed by: INTERNAL MEDICINE

## 2024-02-16 PROCEDURE — 3017F COLORECTAL CA SCREEN DOC REV: CPT | Performed by: INTERNAL MEDICINE

## 2024-02-16 PROCEDURE — G8484 FLU IMMUNIZE NO ADMIN: HCPCS | Performed by: INTERNAL MEDICINE

## 2024-02-16 PROCEDURE — 99215 OFFICE O/P EST HI 40 MIN: CPT | Performed by: INTERNAL MEDICINE

## 2024-02-16 PROCEDURE — G8428 CUR MEDS NOT DOCUMENT: HCPCS | Performed by: INTERNAL MEDICINE

## 2024-02-16 PROCEDURE — 3075F SYST BP GE 130 - 139MM HG: CPT | Performed by: INTERNAL MEDICINE

## 2024-02-16 PROCEDURE — 3078F DIAST BP <80 MM HG: CPT | Performed by: INTERNAL MEDICINE

## 2024-02-16 PROCEDURE — 1036F TOBACCO NON-USER: CPT | Performed by: INTERNAL MEDICINE

## 2024-02-16 RX ORDER — PANTOPRAZOLE SODIUM 40 MG/1
40 TABLET, DELAYED RELEASE ORAL DAILY
Qty: 90 TABLET | Refills: 3 | Status: SHIPPED | OUTPATIENT
Start: 2024-02-16 | End: 2025-02-16

## 2024-02-16 NOTE — PROGRESS NOTES
CC:  LUC, Obesity    BACKGROUND -   Last visit: 01/31/24  First visit: 11/18/21    Obesity   Began in childhood  Initial BMI 61.6, Wt 347.6 lbs, Ht 5' 3\"  HS Grad wt 190 lbs   Lowest   wt  165 lbs age 23  Highest  wt  416 lbs  Pattern of wt gain: grad overall, but with sig losses followed by rapid regain  Wt change past yr: gained 40 lbs, then down 35 lbs  Most wt lost: 94 lbs (food logging + exercise)  Other diets attempted: WW, SF, Meridia, Fad    Desire to lose wt: 10/10  Prob posed by appetite: 7/10    Initial Diet:    Number of meals per day - 4    Number of snacks per day - 6    Meal volume - 12\" plate, sometimes seconds    Fast food/convenience store - 6-8x/week    Restaurants (not fast food) - 1-2x/week   Sweets - 7d/week (8-10 oreos/day or other chocolate snacks/desserts)   Chips - 5-6d/week (8 oz bag)   Crackers/pretzels - 2d/week (2 cups Cheezits)   Nuts - 0d/week   Peanut Butter - 3d/week (4 Tbsp on bread as a meal)   Popcorn - 2d/week (reg sized MW bag)   Dried fruit - 0d/week   Whole fruit - 5-6d/week (1 serving)   Breakfast cereal - 0-1d/week   Granola/Protein/Energy bar - 0d/week   Sugar sweetened beverages - 40 oz reg soda/d, SB Trenta Refresher Tea 4x/wk (190 each)   Protein - No supplements   Fiber - No supplements     Exercise:    Gym membership - none    Walking - none    Running - none    Resistance - none    Aerobic class - none    ______________________    [facility] -  Past Medical History:   Diagnosis Date    Anxiety     Coronary arteriosclerosis     Degenerative joint disease     involving multiple joints    Depression     Hyperlipidemia     Hypothyroidism     MI, old 1998    Obesity     Palpitations     Peptic reflux disease     Sleep apnea     Type 2 diabetes mellitus without complication, without long-term current use of insulin ([facility]) 2/17/2022     Current Outpatient Medications   Medication Sig Dispense Refill    atenolol (TENORMIN) 25 MG tablet TAKE 1 TABLET BY MOUTH THREE TIMES A  
none

## 2024-02-16 NOTE — PATIENT INSTRUCTIONS
Summary of weight change:     Weight  Date   347.6 lbs 21 (had lost 35 lbs prior to seeing me)     21  349.0 lbs home, started diet on this day   332.0 lbs 21  332.0 lbs home   321.0 lbs 22     308.8 lbs 22  306.4 lbs home   300.0 lbs 22  298.6 lbs home   286.0 lbs 22  284.2 lbs home (diagnosed with Covid 22)   274.2 lbs 22  273.4 lbs home   265.2 lbs 22  264.1 lbs home   259.4 lbs 10/11/22  257.4 lbs home   262.2 lbs 11/10/22  259.7 lbs home   262.0 lbs 22  258.2 lbs home   270.8 lbs 23  268.8 lbs home (new scales)   265.2 lbs 23  263.2 lbs home  Her dog  2/3/23. Had Norovirus (GI) 23 and Covid 2/10/23.    272.2 lbs 23  269.7 lbs home   -------  23  273.8 lbs home   282.2 lbs 23  279.9 lbs home Poor sleep, Sister from [place] stayed for three weeks. 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   322.8 lbs 24  -------       home Venlafaxine increased by adding a 37.5 mg tablet to the 75 mg tablet   318.4 lbs 24  314.1 lbs home  (weighed 322.7 lbs at home on 24)  Total wt loss to date: 29.2 lbs  Estimated daily energy needs = 1830 rachel/d (was initially 1900 rachel/d = 13,300 rachel/wk)  Avg daily energy variance:   21 - 21 = - 15.4 lbs/30d = - 1,800 rachel/d deficit   21 - 22 = - 11.0 lbs /27d = - 1,426 rachel/d deficit   22 - 22 = - 12.4 lbs /36d = - 1,205 rachel/d deficit   22 - 22 = -   7.8 lbs/40d = -   682 rachel/d deficit   22 - 22 = -  14.4 lbs/43d = - 1,172 rachel/d deficit   22 - 22 = -  11.8 lbs/35d = - 1,180 rachel/d deficit   22 - 22 = -    9.3 lbs/35d = -    930 rachel/d deficit   22 - 10/11/22 = -    6.7 lbs/69d = -    340 rachel/d deficit   10/11/22 - 11/10/22 = +   2.3 lbs/30d = +   268 rachel/d excess   11/10/22 -

## 2024-03-12 ENCOUNTER — OFFICE VISIT (OUTPATIENT)
Dept: BARIATRICS/WEIGHT MGMT | Age: 59
End: 2024-03-12
Payer: COMMERCIAL

## 2024-03-12 VITALS
WEIGHT: 293 LBS | HEART RATE: 64 BPM | BODY MASS INDEX: 51.91 KG/M2 | DIASTOLIC BLOOD PRESSURE: 81 MMHG | TEMPERATURE: 97 F | HEIGHT: 63 IN | SYSTOLIC BLOOD PRESSURE: 141 MMHG

## 2024-03-12 DIAGNOSIS — E66.01 CLASS 3 SEVERE OBESITY DUE TO EXCESS CALORIES WITHOUT SERIOUS COMORBIDITY WITH BODY MASS INDEX (BMI) OF 60.0 TO 69.9 IN ADULT (HCC): ICD-10-CM

## 2024-03-12 DIAGNOSIS — G47.33 SLEEP APNEA, OBSTRUCTIVE: Primary | ICD-10-CM

## 2024-03-12 PROCEDURE — 1036F TOBACCO NON-USER: CPT | Performed by: INTERNAL MEDICINE

## 2024-03-12 PROCEDURE — 3017F COLORECTAL CA SCREEN DOC REV: CPT | Performed by: INTERNAL MEDICINE

## 2024-03-12 PROCEDURE — G8417 CALC BMI ABV UP PARAM F/U: HCPCS | Performed by: INTERNAL MEDICINE

## 2024-03-12 PROCEDURE — G8428 CUR MEDS NOT DOCUMENT: HCPCS | Performed by: INTERNAL MEDICINE

## 2024-03-12 PROCEDURE — G8484 FLU IMMUNIZE NO ADMIN: HCPCS | Performed by: INTERNAL MEDICINE

## 2024-03-12 PROCEDURE — 99211 OFF/OP EST MAY X REQ PHY/QHP: CPT

## 2024-03-12 PROCEDURE — 3079F DIAST BP 80-89 MM HG: CPT | Performed by: INTERNAL MEDICINE

## 2024-03-12 PROCEDURE — 99214 OFFICE O/P EST MOD 30 MIN: CPT | Performed by: INTERNAL MEDICINE

## 2024-03-12 PROCEDURE — 3077F SYST BP >= 140 MM HG: CPT | Performed by: INTERNAL MEDICINE

## 2024-03-12 NOTE — PATIENT INSTRUCTIONS
Summary of weight change:   Weight  Date   347.6 lbs 11/18/21 (had lost 35 lbs prior to seeing me)     11/30/21  349.0 lbs home, started diet on this day   332.0 lbs 12/30/21  332.0 lbs home   321.0 lbs 01/26/22     308.8 lbs 03/03/22  306.4 lbs home   300.0 lbs 04/12/22  298.6 lbs home   286.0 lbs 05/25/22  284.2 lbs home    274.2 lbs 06/29/22  273.4 lbs home   265.2 lbs 08/03/22  264.1 lbs home   259.4 lbs 10/11/22  257.4 lbs home   262.2 lbs 11/10/22  259.7 lbs home   262.0 lbs 12/13/22  258.2 lbs home   270.8 lbs 01/17/23  268.8 lbs home (new scales)   265.2 lbs 03/02/23  263.2 lbs home     272.2 lbs 04/28/23  269.7 lbs home   -------  05/18/23  273.8 lbs home   282.2 lbs 06/22/23  279.9 lbs home    283.2 lbs 07/27/23  280.0 lbs home   292.3 lbs 09/28/23  289.6 lbs home 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   322.8 lbs 01/31/24  -------       home Venlafaxine increased by adding a 37.5 mg tablet to the 75 mg tablet   318.4 lbs 02/16/24  314.1 lbs home  (weighed 322.7 lbs at home on 2/6/24)   314.0 lbs 03/12/24  310.6  lbs home  Total wt loss to date: 33.6 lbs  Estimated daily energy needs = 1830 rachel/d   Avg daily energy variance:   11/30/21 - 12/30/21 = - 15.4 lbs/30d = - 1,800 rachel/d deficit   12/30/21 - 01/26/22 = - 11.0 lbs /27d = - 1,426 rachel/d deficit   01/26/22 - 03/03/22 = - 12.4 lbs /36d = - 1,205 rachel/d deficit   03/03/22 - 04/12/22 = -   7.8 lbs/40d = -   682 rachel/d deficit   04/12/22 - 05/25/22 = -  14.4 lbs/43d = - 1,172 rachel/d deficit   05/25/22 - 06/29/22 = -  11.8 lbs/35d = - 1,180 rachel/d deficit   06/29/22 - 08/03/22 = -    9.3 lbs/35d = -    930 rachel/d deficit   08/03/22 - 10/11/22 = -    6.7 lbs/69d = -    340 rachel/d deficit   10/11/22 - 11/10/22 = +   2.3 lbs/30d = +   268 rachel/d excess   11/10/22 - 12/13/22 = -    0.2 lbs/33d = -      21 rachel/d deficit   12/13/22 - 01/17/23 = +   8.8 lbs/34d = +   905 rachel/d excess    01/17/23 - 03/02/23 = -    5.6 lbs/44d = -    445 rachel/d deficit   03/02/23 -

## 2024-03-12 NOTE — PROGRESS NOTES
CC:  LUC, Obesity    BACKGROUND -   Last visit: 02/16/24  First visit: 11/18/21    Obesity   Began in childhood  Initial BMI 61.6, Wt 347.6 lbs, Ht 5' 3\"  HS Grad wt 190 lbs   Lowest   wt  165 lbs age 23  Highest  wt  416 lbs  Pattern of wt gain: grad overall, but with sig losses followed by rapid regain  Wt change past yr: gained 40 lbs, then down 35 lbs  Most wt lost: 94 lbs (food logging + exercise)  Other diets attempted: WW, SF, Meridia, Fad    Desire to lose wt: 10/10  Prob posed by appetite: 7/10    Initial Diet:    Number of meals per day - 4    Number of snacks per day - 6    Meal volume - 12\" plate, sometimes seconds    Fast food/convenience store - 6-8x/week    Restaurants (not fast food) - 1-2x/week   Sweets - 7d/week (8-10 oreos/day or other chocolate snacks/desserts)   Chips - 5-6d/week (8 oz bag)   Crackers/pretzels - 2d/week (2 cups Cheezits)   Nuts - 0d/week   Peanut Butter - 3d/week (4 Tbsp on bread as a meal)   Popcorn - 2d/week (reg sized MW bag)   Dried fruit - 0d/week   Whole fruit - 5-6d/week (1 serving)   Breakfast cereal - 0-1d/week   Granola/Protein/Energy bar - 0d/week   Sugar sweetened beverages - 40 oz reg soda/d, SB Trenta Refresher Tea 4x/wk (190 each)   Protein - No supplements   Fiber - No supplements     Exercise:    Gym membership - none    Walking - none    Running - none    Resistance - none    Aerobic class - none    ______________________    PFS -  Past Medical History:   Diagnosis Date    Anxiety     Coronary arteriosclerosis     Degenerative joint disease     involving multiple joints    Depression     Hyperlipidemia     Hypothyroidism     MI, old 1998    Obesity     Palpitations     Peptic reflux disease     Sleep apnea     Type 2 diabetes mellitus without complication, without long-term current use of insulin ([facility]) 2/17/2022     Current Outpatient Medications   Medication Sig Dispense Refill    pantoprazole (PROTONIX) 40 MG tablet Take 1 tablet by mouth daily 90 tablet 3

## 2024-04-30 ENCOUNTER — OFFICE VISIT (OUTPATIENT)
Dept: BARIATRICS/WEIGHT MGMT | Age: 59
End: 2024-04-30
Payer: COMMERCIAL

## 2024-04-30 VITALS
WEIGHT: 293 LBS | HEART RATE: 72 BPM | HEIGHT: 63 IN | BODY MASS INDEX: 51.91 KG/M2 | SYSTOLIC BLOOD PRESSURE: 139 MMHG | TEMPERATURE: 98.1 F | DIASTOLIC BLOOD PRESSURE: 54 MMHG

## 2024-04-30 DIAGNOSIS — E66.01 CLASS 3 SEVERE OBESITY DUE TO EXCESS CALORIES WITHOUT SERIOUS COMORBIDITY WITH BODY MASS INDEX (BMI) OF 60.0 TO 69.9 IN ADULT (HCC): ICD-10-CM

## 2024-04-30 DIAGNOSIS — I10 ESSENTIAL HYPERTENSION: Primary | Chronic | ICD-10-CM

## 2024-04-30 PROCEDURE — 3075F SYST BP GE 130 - 139MM HG: CPT | Performed by: INTERNAL MEDICINE

## 2024-04-30 PROCEDURE — G8417 CALC BMI ABV UP PARAM F/U: HCPCS | Performed by: INTERNAL MEDICINE

## 2024-04-30 PROCEDURE — 1036F TOBACCO NON-USER: CPT | Performed by: INTERNAL MEDICINE

## 2024-04-30 PROCEDURE — 99211 OFF/OP EST MAY X REQ PHY/QHP: CPT

## 2024-04-30 PROCEDURE — 3078F DIAST BP <80 MM HG: CPT | Performed by: INTERNAL MEDICINE

## 2024-04-30 PROCEDURE — G8428 CUR MEDS NOT DOCUMENT: HCPCS | Performed by: INTERNAL MEDICINE

## 2024-04-30 PROCEDURE — 3017F COLORECTAL CA SCREEN DOC REV: CPT | Performed by: INTERNAL MEDICINE

## 2024-04-30 PROCEDURE — 99214 OFFICE O/P EST MOD 30 MIN: CPT | Performed by: INTERNAL MEDICINE

## 2024-04-30 RX ORDER — VALSARTAN 80 MG/1
80 TABLET ORAL DAILY
Qty: 90 TABLET | Refills: 5 | Status: SHIPPED | OUTPATIENT
Start: 2024-04-30

## 2024-04-30 NOTE — PROGRESS NOTES
present time. Since she also has anxiety, venlafaxine should be added to bupropion. (The dose of amitriptyline is small and therefore the increased risk of serotonin syndrome is very small; I discussed this with her.) My hope is that she will experience some appetite suppression from the venlafaxine.  She has a strong family history of early heart disease and some issues in the past with ectopy. Phentermine is therefore not a good choice for her.  She preferred venlafaxine over topiramate b/c of the side effect profile (also she is taking a loop diurectic)  Did not tolerate venlafaxine due to poor overall sense of well being. However, would like to try it again. No other options are available other than topiramate.  Retried it at her request on 12/30/21 and she was able to tolerate it. 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 (sleep deprivation has been less because the cleaning is taking less time)  Calorie monitoring: Counting 7d/wk, usual intake [id number] rachel/d  Protein: 86 g/d, one Premier protein shake/d, eats one serving of meat 3-4d/wk  Fiber: 16g/d, 1/2 cup Fiber One cereal, 1/3rd cup of granola (7g) 2d/wk, no beans   Appetite suppressant -  Venlafaxine 75 mg + 37.5 mg daily, Appetite suppression: 7-8/10; Side effects: none, + mood elevation   Exercise: 30 min, 5d/wk   Assessment of Obesity -  Improved; Excellent adherence to plan; great response to venlafaxine; cont without change  Plan -   Count every calorie every day    Limit calories to 1500 rachel/day (this is a target)    Limit sweets to one day per month    Limit salty snacks to 100

## 2024-04-30 NOTE — PATIENT INSTRUCTIONS
Summary of weight change:   Weight  Date   347.6 lbs 21 (had lost 35 lbs prior to seeing me)     21  349.0 lbs home, started diet on this day   332.0 lbs 21  332.0 lbs home   321.0 lbs 22     308.8 lbs 22  306.4 lbs home   300.0 lbs 22  298.6 lbs home   286.0 lbs 22  284.2 lbs home (diagnosed with Covid 22)   274.2 lbs 22  273.4 lbs home   265.2 lbs 22  264.1 lbs home   259.4 lbs 10/11/22  257.4 lbs home   262.2 lbs 11/10/22  259.7 lbs home   262.0 lbs 22  258.2 lbs home   270.8 lbs 23  268.8 lbs home (new scales)   265.2 lbs 23  263.2 lbs home  Her dog  2/3/23. Had Norovirus (GI) 23 and Covid 2/10/23.    272.2 lbs 23  269.7 lbs home   -------  23  273.8 lbs home   282.2 lbs 23  279.9 lbs home Poor sleep, Sister from [place] stayed for three weeks. 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   322.8 lbs 24  -------       home Venlafaxine increased by adding a 37.5 mg tablet to the 75 mg tablet   318.4 lbs 24  314.1 lbs home  (weighed 322.7 lbs at home on 24)   314.0 lbs 24  310.6 lbs home   312.6 lbs 24  308.8 lbs home  Total wt change to date: - 35.0 lbs  Estimated daily energy needs = 1830 rachel/d (was initially 1900 rachel/d)  Avg daily energy variance:   21 - 21 = - 15.4 lbs/30d = - 1,800 rachel/d deficit   21 - 22 = - 11.0 lbs /27d = - 1,426 rachel/d deficit   22 - 22 = - 12.4 lbs /36d = - 1,205 rachel/d deficit   22 - 22 = -   7.8 lbs/40d = -   682 rachel/d deficit   22 - 22 = -  14.4 lbs/43d = - 1,172 rachel/d deficit   22 - 22 = -  11.8 lbs/35d = - 1,180 rachel/d deficit   22 - 22 = -    9.3 lbs/35d = -    930 rachel/d deficit   22 - 10/11/22 = -    6.7 lbs/69d = -    340 rachel/d deficit   10/11/22 -
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
## 2024-05-01 DIAGNOSIS — I10 ESSENTIAL HYPERTENSION: ICD-10-CM

## 2024-05-02 DIAGNOSIS — I10 ESSENTIAL HYPERTENSION: ICD-10-CM

## 2024-05-02 RX ORDER — AMITRIPTYLINE HYDROCHLORIDE 25 MG/1
25 TABLET, FILM COATED ORAL NIGHTLY
Qty: 90 TABLET | Refills: 5 | Status: SHIPPED | OUTPATIENT
Start: 2024-05-02

## 2024-05-02 RX ORDER — ATENOLOL 25 MG/1
TABLET ORAL
Qty: 270 TABLET | Refills: 0 | OUTPATIENT
Start: 2024-05-02

## 2024-05-02 RX ORDER — ATENOLOL 25 MG/1
25 TABLET ORAL 3 TIMES DAILY
Qty: 270 TABLET | Refills: 5 | Status: SHIPPED | OUTPATIENT
Start: 2024-05-02

## 2024-05-14 DIAGNOSIS — E78.2 MIXED HYPERLIPIDEMIA: Chronic | ICD-10-CM

## 2024-05-16 RX ORDER — SIMVASTATIN 40 MG
40 TABLET ORAL NIGHTLY
Qty: 90 TABLET | Refills: 12 | Status: SHIPPED | OUTPATIENT
Start: 2024-05-16

## 2024-09-27 DIAGNOSIS — I10 ESSENTIAL HYPERTENSION: Chronic | ICD-10-CM

## 2024-09-27 RX ORDER — TORSEMIDE 10 MG/1
10 TABLET ORAL DAILY
Qty: 90 TABLET | Refills: 5 | Status: SHIPPED | OUTPATIENT
Start: 2024-09-27

## 2024-09-27 RX ORDER — POTASSIUM CHLORIDE 1500 MG/1
20 TABLET, EXTENDED RELEASE ORAL DAILY
Qty: 90 TABLET | Refills: 12 | Status: SHIPPED | OUTPATIENT
Start: 2024-09-27

## 2024-11-14 NOTE — PROGRESS NOTES
CC -   LUC, Obesity    BACKGROUND -   Last visit:   3/02/23  First visit: 11/18/21    Obesity   Began in childhood  Initial BMI 61.6, Wt 347.6 lbs, Ht 5' 3\"  HS Grad wt 190 lbs   Lowest   wt  165 lbs age 24  Highest  wt  416 lbs  Pattern of wt gain: grad overall, but with sig losses followed by rapid regain  Wt change past yr: gained 40 lbs, then down 35 lbs  Most wt lost: 94 lbs (food logging + exercise)  Other diets attempted: [employer], SF, Meridia, Fad    Desire to lose wt: 10/10  Prob posed by appetite: 7/10    Initial Diet:    Number of meals per day - 4    Number of snacks per day - 6    Meal volume - 12\" plate, sometimes seconds    Fast food/convenience store - 6-8x/week    Restaurants (not fast food) - 1-2x/week   Sweets - 7d/week (8-10 oreos/day or other chocolate snacks/desserts)   Chips - 5-6d/week (8 oz bag)   Crackers/pretzels - 2d/week (2 cups Cheezits)   Nuts - 0d/week   Peanut Butter - 3d/week (4 Tbsp on bread as a meal)   Popcorn - 2d/week (reg sized MW bag)   Dried fruit - 0d/week   Whole fruit - 5-6d/week (1 serving)   Breakfast cereal - 0-1d/week   Granola/Protein/Energy bar - 0d/week   Sugar sweetened beverages - 40 oz reg soda/d, SB Trenta Refresher Tea 4x/wk (190 each)   Protein - No supplements   Fiber - No supplements     Exercise:    Gym membership - none    Walking - none    Running - none    Resistance - none    Aerobic class - none    ______________________    [facility] -  Past Medical History:   Diagnosis Date    Anxiety     Coronary arteriosclerosis     Degenerative joint disease     involving multiple joints    Depression     Hyperlipidemia     Hypothyroidism     MI, old 1998    Obesity     Palpitations     Peptic reflux disease     Sleep apnea     Type 2 diabetes mellitus without complication, without long-term current use of insulin ([facility]) 2/17/2022     Current Outpatient Medications   Medication Sig Dispense Refill    amitriptyline (ELAVIL) 25 MG tablet Take 1 tablet by mouth nightly 90 tablet
Statement Selected

## 2024-11-20 DIAGNOSIS — E11.9 TYPE 2 DIABETES MELLITUS WITHOUT COMPLICATION, WITHOUT LONG-TERM CURRENT USE OF INSULIN (HCC): ICD-10-CM

## 2024-11-20 DIAGNOSIS — I10 ESSENTIAL HYPERTENSION: Chronic | ICD-10-CM

## 2024-11-20 DIAGNOSIS — E55.9 VITAMIN D DEFICIENCY: ICD-10-CM

## 2024-11-20 DIAGNOSIS — E78.2 MIXED HYPERLIPIDEMIA: Chronic | ICD-10-CM

## 2024-11-20 DIAGNOSIS — Z00.01 ENCOUNTER FOR ANNUAL GENERAL MEDICAL EXAMINATION WITH ABNORMAL FINDINGS IN ADULT: ICD-10-CM

## 2024-11-20 DIAGNOSIS — E03.9 ACQUIRED HYPOTHYROIDISM: ICD-10-CM

## 2024-11-20 LAB
ALBUMIN: 4.2 G/DL (ref 3.5–5.2)
ALP BLD-CCNC: 80 U/L (ref 35–104)
ALT SERPL-CCNC: 11 U/L (ref 0–32)
ANION GAP SERPL CALCULATED.3IONS-SCNC: 12 MMOL/L (ref 7–16)
AST SERPL-CCNC: 17 U/L (ref 0–31)
BASOPHILS ABSOLUTE: 0 K/UL (ref 0–0.2)
BASOPHILS RELATIVE PERCENT: 0 % (ref 0–2)
BILIRUB SERPL-MCNC: 0.5 MG/DL (ref 0–1.2)
BUN BLDV-MCNC: 14 MG/DL (ref 6–23)
CALCIUM SERPL-MCNC: 9.8 MG/DL (ref 8.6–10.2)
CHLORIDE BLD-SCNC: 101 MMOL/L (ref 98–107)
CHOLESTEROL, TOTAL: 173 MG/DL
CO2: 27 MMOL/L (ref 22–29)
CREAT SERPL-MCNC: 1 MG/DL (ref 0.5–1)
EOSINOPHILS ABSOLUTE: 0 K/UL (ref 0.05–0.5)
EOSINOPHILS RELATIVE PERCENT: 0 % (ref 0–6)
GFR, ESTIMATED: 66 ML/MIN/1.73M2
GLUCOSE BLD-MCNC: 103 MG/DL (ref 74–99)
HBA1C MFR BLD: 5.7 % (ref 4–5.6)
HCT VFR BLD CALC: 32.7 % (ref 34–48)
HDLC SERPL-MCNC: 54 MG/DL
HEMOGLOBIN: 9 G/DL (ref 11.5–15.5)
LDL CHOLESTEROL: 72 MG/DL
LYMPHOCYTES ABSOLUTE: 1.09 K/UL (ref 1.5–4)
LYMPHOCYTES RELATIVE PERCENT: 16 % (ref 20–42)
MCH RBC QN AUTO: 18.1 PG (ref 26–35)
MCHC RBC AUTO-ENTMCNC: 27.5 G/DL (ref 32–34.5)
MCV RBC AUTO: 65.9 FL (ref 80–99.9)
MONOCYTES ABSOLUTE: 0.18 K/UL (ref 0.1–0.95)
MONOCYTES RELATIVE PERCENT: 3 % (ref 2–12)
NEUTROPHILS ABSOLUTE: 5.63 K/UL (ref 1.8–7.3)
NEUTROPHILS RELATIVE PERCENT: 82 % (ref 43–80)
NUCLEATED RED BLOOD CELLS: 1 PER 100 WBC
PDW BLD-RTO: 18.3 % (ref 11.5–15)
PLATELET, FLUORESCENCE: 261 K/UL (ref 130–450)
PMV BLD AUTO: 10.4 FL (ref 7–12)
POTASSIUM SERPL-SCNC: 4.4 MMOL/L (ref 3.5–5)
RBC # BLD: 4.96 M/UL (ref 3.5–5.5)
RBC # BLD: ABNORMAL 10*6/UL
SODIUM BLD-SCNC: 140 MMOL/L (ref 132–146)
THYROXINE (T4): 9.4 UG/DL (ref 4.5–11.7)
TOTAL PROTEIN: 7.1 G/DL (ref 6.4–8.3)
TRIGL SERPL-MCNC: 233 MG/DL
TSH SERPL DL<=0.05 MIU/L-ACNC: 3.08 UIU/ML (ref 0.27–4.2)
VITAMIN D 25-HYDROXY: 30.7 NG/ML (ref 30–100)
VLDLC SERPL CALC-MCNC: 47 MG/DL
WBC # BLD: 6.9 K/UL (ref 4.5–11.5)

## 2024-11-21 ASSESSMENT — PATIENT HEALTH QUESTIONNAIRE - PHQ9
4. FEELING TIRED OR HAVING LITTLE ENERGY: SEVERAL DAYS
10. IF YOU CHECKED OFF ANY PROBLEMS, HOW DIFFICULT HAVE THESE PROBLEMS MADE IT FOR YOU TO DO YOUR WORK, TAKE CARE OF THINGS AT HOME, OR GET ALONG WITH OTHER PEOPLE: NOT DIFFICULT AT ALL
3. TROUBLE FALLING OR STAYING ASLEEP: SEVERAL DAYS
SUM OF ALL RESPONSES TO PHQ QUESTIONS 1-9: 5
2. FEELING DOWN, DEPRESSED OR HOPELESS: SEVERAL DAYS
SUM OF ALL RESPONSES TO PHQ9 QUESTIONS 1 & 2: 2
5. POOR APPETITE OR OVEREATING: NOT AT ALL
6. FEELING BAD ABOUT YOURSELF - OR THAT YOU ARE A FAILURE OR HAVE LET YOURSELF OR YOUR FAMILY DOWN: SEVERAL DAYS
9. THOUGHTS THAT YOU WOULD BE BETTER OFF DEAD, OR OF HURTING YOURSELF: NOT AT ALL
1. LITTLE INTEREST OR PLEASURE IN DOING THINGS: SEVERAL DAYS
8. MOVING OR SPEAKING SO SLOWLY THAT OTHER PEOPLE COULD HAVE NOTICED. OR THE OPPOSITE, BEING SO FIGETY OR RESTLESS THAT YOU HAVE BEEN MOVING AROUND A LOT MORE THAN USUAL: NOT AT ALL
7. TROUBLE CONCENTRATING ON THINGS, SUCH AS READING THE NEWSPAPER OR WATCHING TELEVISION: NOT AT ALL
4. FEELING TIRED OR HAVING LITTLE ENERGY: SEVERAL DAYS
7. TROUBLE CONCENTRATING ON THINGS, SUCH AS READING THE NEWSPAPER OR WATCHING TELEVISION: NOT AT ALL
SUM OF ALL RESPONSES TO PHQ QUESTIONS 1-9: 5
1. LITTLE INTEREST OR PLEASURE IN DOING THINGS: SEVERAL DAYS
10. IF YOU CHECKED OFF ANY PROBLEMS, HOW DIFFICULT HAVE THESE PROBLEMS MADE IT FOR YOU TO DO YOUR WORK, TAKE CARE OF THINGS AT HOME, OR GET ALONG WITH OTHER PEOPLE: NOT DIFFICULT AT ALL
3. TROUBLE FALLING OR STAYING ASLEEP: SEVERAL DAYS
SUM OF ALL RESPONSES TO PHQ QUESTIONS 1-9: 5
8. MOVING OR SPEAKING SO SLOWLY THAT OTHER PEOPLE COULD HAVE NOTICED. OR THE OPPOSITE - BEING SO FIDGETY OR RESTLESS THAT YOU HAVE BEEN MOVING AROUND A LOT MORE THAN USUAL: NOT AT ALL
9. THOUGHTS THAT YOU WOULD BE BETTER OFF DEAD, OR OF HURTING YOURSELF: NOT AT ALL
SUM OF ALL RESPONSES TO PHQ QUESTIONS 1-9: 5
SUM OF ALL RESPONSES TO PHQ QUESTIONS 1-9: 5
6. FEELING BAD ABOUT YOURSELF - OR THAT YOU ARE A FAILURE OR HAVE LET YOURSELF OR YOUR FAMILY DOWN: SEVERAL DAYS
2. FEELING DOWN, DEPRESSED OR HOPELESS: SEVERAL DAYS
5. POOR APPETITE OR OVEREATING: NOT AT ALL

## 2024-11-22 ENCOUNTER — OFFICE VISIT (OUTPATIENT)
Dept: PRIMARY CARE CLINIC | Age: 59
End: 2024-11-22
Payer: COMMERCIAL

## 2024-11-22 DIAGNOSIS — I10 ESSENTIAL HYPERTENSION: Chronic | ICD-10-CM

## 2024-11-22 DIAGNOSIS — D50.8 OTHER IRON DEFICIENCY ANEMIA: ICD-10-CM

## 2024-11-22 DIAGNOSIS — E78.2 MIXED HYPERLIPIDEMIA: Chronic | ICD-10-CM

## 2024-11-22 DIAGNOSIS — G47.33 SLEEP APNEA, OBSTRUCTIVE: Chronic | ICD-10-CM

## 2024-11-22 DIAGNOSIS — K21.9 GASTROESOPHAGEAL REFLUX DISEASE WITHOUT ESOPHAGITIS: ICD-10-CM

## 2024-11-22 DIAGNOSIS — Z00.01 ENCOUNTER FOR ANNUAL GENERAL MEDICAL EXAMINATION WITH ABNORMAL FINDINGS IN ADULT: Primary | ICD-10-CM

## 2024-11-22 DIAGNOSIS — I25.10 CORONARY ARTERIOSCLEROSIS: ICD-10-CM

## 2024-11-22 DIAGNOSIS — R13.19 OTHER DYSPHAGIA: ICD-10-CM

## 2024-11-22 PROCEDURE — 99396 PREV VISIT EST AGE 40-64: CPT | Performed by: FAMILY MEDICINE

## 2024-11-22 PROCEDURE — G8484 FLU IMMUNIZE NO ADMIN: HCPCS | Performed by: FAMILY MEDICINE

## 2024-11-22 PROCEDURE — 3075F SYST BP GE 130 - 139MM HG: CPT | Performed by: FAMILY MEDICINE

## 2024-11-22 PROCEDURE — 3079F DIAST BP 80-89 MM HG: CPT | Performed by: FAMILY MEDICINE

## 2024-11-22 ASSESSMENT — ENCOUNTER SYMPTOMS
EYES NEGATIVE: 1
RESPIRATORY NEGATIVE: 1
GASTROINTESTINAL NEGATIVE: 1
ALLERGIC/IMMUNOLOGIC NEGATIVE: 1

## 2024-11-22 NOTE — PROGRESS NOTES
24  Name: Floresita Flynn    : 1965    Sex: female    Age: 59 y.o.        Subjective:  Chief Complaint: Patient is here for one year ck  re  welenss     gerd  wieght   diab  bp anx   cad   anemia     Feel ok      quit  claening job    wt poor   kenes ache  Lab with hb down   she  was anemic past  and saw dr bartlett   now ra[place] ice  bm ok no cp ro sob  No chest pain or shortness of breath.  Has not been to cardiology several years request switch to [facility]  Overdue for an MRI of the adrenals will schedule and referred the copy to Dr. Leon---advsied see him  she lenard consdier        Review of Systems   Constitutional: Negative.    HENT: Negative.     Eyes: Negative.    Respiratory: Negative.     Cardiovascular: Negative.    Gastrointestinal: Negative.    Endocrine: Negative.    Genitourinary: Negative.    Musculoskeletal: Negative.    Skin: Negative.    Allergic/Immunologic: Negative.    Neurological: Negative.    Hematological: Negative.    Psychiatric/Behavioral: Negative.           Current Outpatient Medications:     torsemide (DEMADEX) 10 MG tablet, Take 1 tablet by mouth daily, Disp: 90 tablet, Rfl: 5    potassium chloride (KLOR-CON M20) 20 MEQ extended release tablet, Take 1 tablet by mouth daily, Disp: 90 tablet, Rfl: 12    simvastatin (ZOCOR) 40 MG tablet, TAKE 1 TABLET BY MOUTH NIGHTLY, Disp: 90 tablet, Rfl: 12    atenolol (TENORMIN) 25 MG tablet, Take 1 tablet by mouth 3 times daily, Disp: 270 tablet, Rfl: 5    amitriptyline (ELAVIL) 25 MG tablet, Take 1 tablet by mouth nightly, Disp: 90 tablet, Rfl: 5    valsartan (DIOVAN) 80 MG tablet, Take 1 tablet by mouth daily, Disp: 90 tablet, Rfl: 5    pantoprazole (PROTONIX) 40 MG tablet, Take 1 tablet by mouth daily, Disp: 90 tablet, Rfl: 3    venlafaxine (EFFEXOR XR) 75 MG extended release capsule, Take one 75 mg capsule each day along with a 37.5 mg capsule, Disp: 90 capsule, Rfl: 3    venlafaxine (EFFEXOR XR) 37.5 MG extended release

## 2024-11-23 VITALS
HEART RATE: 90 BPM | SYSTOLIC BLOOD PRESSURE: 138 MMHG | HEIGHT: 63 IN | BODY MASS INDEX: 51.91 KG/M2 | TEMPERATURE: 98.9 F | DIASTOLIC BLOOD PRESSURE: 84 MMHG | WEIGHT: 293 LBS | OXYGEN SATURATION: 95 %

## 2024-11-23 DIAGNOSIS — F41.9 ANXIETY: ICD-10-CM

## 2024-11-23 DIAGNOSIS — E03.9 ACQUIRED HYPOTHYROIDISM: Chronic | ICD-10-CM

## 2024-11-23 RX ORDER — LEVOTHYROXINE SODIUM 150 UG/1
150 TABLET ORAL DAILY
Qty: 90 TABLET | Refills: 5 | Status: SHIPPED | OUTPATIENT
Start: 2024-11-23

## 2024-11-23 RX ORDER — BUPROPION HYDROCHLORIDE 150 MG/1
150 TABLET ORAL EVERY MORNING
Qty: 90 TABLET | Refills: 5 | Status: SHIPPED | OUTPATIENT
Start: 2024-11-23

## 2024-11-23 SDOH — ECONOMIC STABILITY: FOOD INSECURITY: WITHIN THE PAST 12 MONTHS, YOU WORRIED THAT YOUR FOOD WOULD RUN OUT BEFORE YOU GOT MONEY TO BUY MORE.: NEVER TRUE

## 2024-11-23 SDOH — ECONOMIC STABILITY: FOOD INSECURITY: WITHIN THE PAST 12 MONTHS, THE FOOD YOU BOUGHT JUST DIDN'T LAST AND YOU DIDN'T HAVE MONEY TO GET MORE.: NEVER TRUE

## 2024-11-23 SDOH — ECONOMIC STABILITY: INCOME INSECURITY: HOW HARD IS IT FOR YOU TO PAY FOR THE VERY BASICS LIKE FOOD, HOUSING, MEDICAL CARE, AND HEATING?: NOT HARD AT ALL

## 2024-11-23 NOTE — TELEPHONE ENCOUNTER
Notify patient I went to put the order in for the MRI because of her allergy we cannot do contrast.  I do not know if that is appropriate to do an MRI without contrast.  She is overdue to see -----tell her I think it is best that she call his office and find out what he suggests   73

## 2024-12-09 ENCOUNTER — OFFICE VISIT (OUTPATIENT)
Dept: BARIATRICS/WEIGHT MGMT | Age: 59
End: 2024-12-09
Payer: COMMERCIAL

## 2024-12-09 VITALS
DIASTOLIC BLOOD PRESSURE: 68 MMHG | WEIGHT: 293 LBS | BODY MASS INDEX: 51.91 KG/M2 | HEIGHT: 63 IN | HEART RATE: 67 BPM | SYSTOLIC BLOOD PRESSURE: 158 MMHG | TEMPERATURE: 97.3 F

## 2024-12-09 DIAGNOSIS — E66.813 CLASS 3 SEVERE OBESITY DUE TO EXCESS CALORIES WITHOUT SERIOUS COMORBIDITY WITH BODY MASS INDEX (BMI) OF 60.0 TO 69.9 IN ADULT: ICD-10-CM

## 2024-12-09 DIAGNOSIS — G47.33 SLEEP APNEA, OBSTRUCTIVE: Primary | ICD-10-CM

## 2024-12-09 DIAGNOSIS — E66.01 CLASS 3 SEVERE OBESITY DUE TO EXCESS CALORIES WITHOUT SERIOUS COMORBIDITY WITH BODY MASS INDEX (BMI) OF 60.0 TO 69.9 IN ADULT: ICD-10-CM

## 2024-12-09 PROCEDURE — G8417 CALC BMI ABV UP PARAM F/U: HCPCS | Performed by: INTERNAL MEDICINE

## 2024-12-09 PROCEDURE — 99215 OFFICE O/P EST HI 40 MIN: CPT | Performed by: INTERNAL MEDICINE

## 2024-12-09 PROCEDURE — 3017F COLORECTAL CA SCREEN DOC REV: CPT | Performed by: INTERNAL MEDICINE

## 2024-12-09 PROCEDURE — 99211 OFF/OP EST MAY X REQ PHY/QHP: CPT

## 2024-12-09 PROCEDURE — G8484 FLU IMMUNIZE NO ADMIN: HCPCS | Performed by: INTERNAL MEDICINE

## 2024-12-09 PROCEDURE — G8428 CUR MEDS NOT DOCUMENT: HCPCS | Performed by: INTERNAL MEDICINE

## 2024-12-09 PROCEDURE — 3078F DIAST BP <80 MM HG: CPT | Performed by: INTERNAL MEDICINE

## 2024-12-09 PROCEDURE — 3077F SYST BP >= 140 MM HG: CPT | Performed by: INTERNAL MEDICINE

## 2024-12-09 PROCEDURE — 1036F TOBACCO NON-USER: CPT | Performed by: INTERNAL MEDICINE

## 2024-12-09 RX ORDER — VENLAFAXINE HYDROCHLORIDE 75 MG/1
CAPSULE, EXTENDED RELEASE ORAL
Qty: 90 CAPSULE | Refills: 3 | Status: SHIPPED | OUTPATIENT
Start: 2024-12-09

## 2024-12-09 RX ORDER — VENLAFAXINE HYDROCHLORIDE 37.5 MG/1
37.5 CAPSULE, EXTENDED RELEASE ORAL DAILY
Qty: 90 CAPSULE | Refills: 3 | Status: SHIPPED | OUTPATIENT
Start: 2024-12-09

## 2024-12-09 NOTE — PROGRESS NOTES
Her depression is uncontrolled at the present time. Since she also has anxiety, venlafaxine should be added to bupropion. (The dose of amitriptyline is small and therefore the increased risk of serotonin syndrome is very small; I discussed this with her.) My hope is that she will experience some appetite suppression from the venlafaxine.  She has a strong family history of early heart disease and some issues in the past with ectopy. Phentermine is therefore not a good choice for her.  She preferred venlafaxine over topiramate b/c of the side effect profile (also she is taking a loop diurectic)  Did not tolerate venlafaxine due to poor overall sense of well being. However, would like to try it again. No other options are available other than topiramate.  Retried it at her request on 12/30/21 and she was able to tolerate it. 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  Calorie monitoring: Counting 7d/wk, usual intake 5525-0743 rachel/d  Protein: none  Fiber: no fiber supplements  Appetite suppressant - Venlafaxine  Exercise: none  Regained to 341 lbs home weight; sister moved out 09/21/24; then lost 20 lbs  Assessment of Obesity -  Worsened; non-adherence due to stressors and upheaval of life routine; ready to resume efforts following her plan; cont venlafaxine  Plan -   Count every calorie every day    Limit calories to 1500 rachel/day (this is a target)    Limit sweets to one day per month    Limit salty snacks to 100 rachel/day    Do not drink any sugar sweetened beverages    Limit restaurant food to once every week    Protein intake should be 75 grams/day    Fiber

## 2024-12-09 NOTE — PATIENT INSTRUCTIONS
Instructions:    Count every calorie every day    Limit calories to 1500 rachel/day (this is a target)    Limit sweets to one day per month    Limit salty snacks to 100 rachel/day    Do not drink any sugar sweetened beverages    Limit restaurant food to once every week    Protein intake should be 75 grams/day    Fiber intake should be 22 grams/day     Cont venlafaxine ER to one 75 mg tablet + one 37.5 mg tablet every day     Make sure that water intake is >64 oz/day  ______________________      Follow up  In 5 weeks

## 2024-12-10 ENCOUNTER — TELEPHONE (OUTPATIENT)
Dept: PRIMARY CARE CLINIC | Age: 59
End: 2024-12-10

## 2024-12-10 DIAGNOSIS — K29.61 GASTROINTESTINAL HEMORRHAGE ASSOCIATED WITH OTHER GASTRITIS: ICD-10-CM

## 2024-12-10 DIAGNOSIS — K21.9 GASTROESOPHAGEAL REFLUX DISEASE WITHOUT ESOPHAGITIS: ICD-10-CM

## 2024-12-10 DIAGNOSIS — D50.8 OTHER IRON DEFICIENCY ANEMIA: Primary | ICD-10-CM

## 2024-12-10 NOTE — TELEPHONE ENCOUNTER
Wants referral to Dr Erasto Gardner at [facility]. The pt never heard back from the prior referral and she even called them also, would like to go elsewhere.  Referral due to severe anemia.

## 2024-12-26 DIAGNOSIS — D50.8 OTHER IRON DEFICIENCY ANEMIA: ICD-10-CM

## 2024-12-26 LAB
CONTROL: NORMAL
FECAL BLOOD IMMUNOCHEMICAL TEST: NORMAL

## 2024-12-27 ENCOUNTER — TELEPHONE (OUTPATIENT)
Dept: PRIMARY CARE CLINIC | Age: 59
End: 2024-12-27

## 2024-12-27 DIAGNOSIS — K29.61 GASTROINTESTINAL HEMORRHAGE ASSOCIATED WITH OTHER GASTRITIS: ICD-10-CM

## 2024-12-27 DIAGNOSIS — D50.8 OTHER IRON DEFICIENCY ANEMIA: Primary | ICD-10-CM

## 2024-12-27 NOTE — TELEPHONE ENCOUNTER
Notify patient the stool test was positive for blood.  Ask her what her appointment is scheduled with the gastroenterologist.

## 2024-12-27 NOTE — TELEPHONE ENCOUNTER
Patient advised. Patient states she spoke with you via text this morning but states she is waiting for Dr Okeefe's office to call her to schedule an appointment.

## 2024-12-27 NOTE — TELEPHONE ENCOUNTER
LM for patient notifying referral office should be in touch with her over next few days to schedule and to call office if no call is received by next week.

## 2025-01-08 ENCOUNTER — OFFICE VISIT (OUTPATIENT)
Dept: SURGERY | Age: 60
End: 2025-01-08
Payer: COMMERCIAL

## 2025-01-08 VITALS
OXYGEN SATURATION: 100 % | TEMPERATURE: 97.5 F | HEIGHT: 63 IN | SYSTOLIC BLOOD PRESSURE: 154 MMHG | BODY MASS INDEX: 51.91 KG/M2 | HEART RATE: 94 BPM | DIASTOLIC BLOOD PRESSURE: 78 MMHG | RESPIRATION RATE: 18 BRPM | WEIGHT: 293 LBS

## 2025-01-08 DIAGNOSIS — R10.13 EPIGASTRIC PAIN: ICD-10-CM

## 2025-01-08 DIAGNOSIS — D50.9 IRON DEFICIENCY ANEMIA, UNSPECIFIED IRON DEFICIENCY ANEMIA TYPE: Primary | ICD-10-CM

## 2025-01-08 DIAGNOSIS — K21.9 GASTROESOPHAGEAL REFLUX DISEASE, UNSPECIFIED WHETHER ESOPHAGITIS PRESENT: ICD-10-CM

## 2025-01-08 PROCEDURE — G8427 DOCREV CUR MEDS BY ELIG CLIN: HCPCS | Performed by: SURGERY

## 2025-01-08 PROCEDURE — 1036F TOBACCO NON-USER: CPT | Performed by: SURGERY

## 2025-01-08 PROCEDURE — 99204 OFFICE O/P NEW MOD 45 MIN: CPT | Performed by: SURGERY

## 2025-01-08 PROCEDURE — M1308 PR FLU IMMUNIZE NO ADMIN: HCPCS | Performed by: SURGERY

## 2025-01-08 PROCEDURE — G8417 CALC BMI ABV UP PARAM F/U: HCPCS | Performed by: SURGERY

## 2025-01-08 PROCEDURE — 3078F DIAST BP <80 MM HG: CPT | Performed by: SURGERY

## 2025-01-08 PROCEDURE — 3077F SYST BP >= 140 MM HG: CPT | Performed by: SURGERY

## 2025-01-08 PROCEDURE — 3017F COLORECTAL CA SCREEN DOC REV: CPT | Performed by: SURGERY

## 2025-01-08 RX ORDER — SODIUM CHLORIDE 0.9 % (FLUSH) 0.9 %
5-40 SYRINGE (ML) INJECTION EVERY 12 HOURS SCHEDULED
OUTPATIENT
Start: 2025-01-08

## 2025-01-08 RX ORDER — SUCRALFATE 1 G/1
1 TABLET ORAL 4 TIMES DAILY
Qty: 120 TABLET | Refills: 3 | Status: SHIPPED | OUTPATIENT
Start: 2025-01-08

## 2025-01-08 RX ORDER — SODIUM CHLORIDE 9 MG/ML
INJECTION, SOLUTION INTRAVENOUS PRN
OUTPATIENT
Start: 2025-01-08

## 2025-01-08 RX ORDER — SODIUM CHLORIDE 0.9 % (FLUSH) 0.9 %
5-40 SYRINGE (ML) INJECTION PRN
OUTPATIENT
Start: 2025-01-08

## 2025-01-08 NOTE — PROGRESS NOTES
sounds: Normal breath sounds.   Abdominal:      General: There is no distension.      Palpations: Abdomen is soft.      Tenderness: There is no abdominal tenderness.   Musculoskeletal:         General: Normal range of motion.      Cervical back: Normal range of motion.   Skin:     General: Skin is warm and dry.   Neurological:      General: No focal deficit present.      Mental Status: She is alert and oriented to person, place, and time.   Psychiatric:         Mood and Affect: Mood normal.         Behavior: Behavior normal.         CBC:   Lab Results   Component Value Date/Time    WBC 6.9 11/20/2024 07:25 AM    RBC 4.96 11/20/2024 07:25 AM    HGB 9.0 11/20/2024 07:25 AM    HCT 32.7 11/20/2024 07:25 AM    MCV 65.9 11/20/2024 07:25 AM    MCH 18.1 11/20/2024 07:25 AM    MCHC 27.5 11/20/2024 07:25 AM    RDW 18.3 11/20/2024 07:25 AM     08/22/2023 07:50 AM    MPV 10.4 11/20/2024 07:25 AM     CMP:    Lab Results   Component Value Date/Time     11/20/2024 07:25 AM    K 4.4 11/20/2024 07:25 AM    K 3.8 12/02/2019 10:26 AM     11/20/2024 07:25 AM    CO2 27 11/20/2024 07:25 AM    BUN 14 11/20/2024 07:25 AM    CREATININE 1.0 11/20/2024 07:25 AM    GFRAA >60 09/28/2022 07:29 AM    LABGLOM 66 11/20/2024 07:25 AM    LABGLOM >60 08/22/2023 07:50 AM    GLUCOSE 103 11/20/2024 07:25 AM    CALCIUM 9.8 11/20/2024 07:25 AM    BILITOT 0.5 11/20/2024 07:25 AM    ALKPHOS 80 11/20/2024 07:25 AM    AST 17 11/20/2024 07:25 AM    ALT 11 11/20/2024 07:25 AM     PT/INR:    Lab Results   Component Value Date/Time    PROTIME 10.6 12/30/2015 09:50 AM    INR 1.0 12/30/2015 09:50 AM     HgBA1c:    Lab Results   Component Value Date/Time    LABA1C 5.7 11/20/2024 07:25 AM     LIPASE:    Lab Results   Component Value Date/Time    LIPASE 18 12/02/2019 10:26 AM          Nav Okeefe DO    I have examined the patient and performed the key aspects of physical exam, and reviewed the record (including laboratory findings, results, and

## 2025-01-08 NOTE — H&P (VIEW-ONLY)
[facility] Surgery Clinic Note    Assessment/Plan:      Diagnosis Orders   1. Iron deficiency anemia, unspecified iron deficiency anemia type        2. Gastroesophageal reflux disease, unspecified whether esophagitis present        3. Epigastric pain          Will get her scheduled for EGD and diagnostic colonoscopy  Rx for carafate sent to her pharmacy    I have explained the risks, benefits, alternatives, and potential complications associated with the proposed procedure to be performed and other issues when applicable with the patient/responsible party prior to the procedure. All of their questions were answered as appropriate and to their satisfaction. They understand and agree to the procedure.     Chief Complaint   Patient presents with    New Patient      Gastrointestinal hemorrhage, anemia,   Patient states she has be having off and on stomach issues for years. And has trouble swallowing sometimes, notices it a few times a week.         PCP: Will Yates DO    HPI: Floresita Flynn is a 59 y.o. female who presents in consultation for anemia, abdominal pain, and nausea. Patient has had GERD for over 20 years and has been on PPI therapy since that time. She also has been dealing with abdominal pain for several years. She describes it as random severe pains in the upper abdomen that are not triggered by anything specifically and are associated with nausea. Recently, she was found to have iron deficiency anemia. She denies any hematemesis, melena, or hematochezia. She has never had an EGD or colonoscopy.      Past Medical History:   Diagnosis Date    Anxiety     Coronary arteriosclerosis     Degenerative joint disease     involving multiple joints    Depression     Hyperlipidemia     Hypothyroidism     MI, old 1998    Obesity     Palpitations     Peptic reflux disease     Sleep apnea     Type 2 diabetes mellitus without complication, without long-term current use of insulin ([facility]) 2/17/2022       Past

## 2025-01-15 ENCOUNTER — TELEPHONE (OUTPATIENT)
Dept: PRIMARY CARE CLINIC | Age: 60
End: 2025-01-15

## 2025-01-16 ENCOUNTER — OFFICE VISIT (OUTPATIENT)
Dept: BARIATRICS/WEIGHT MGMT | Age: 60
End: 2025-01-16
Payer: COMMERCIAL

## 2025-01-16 VITALS
DIASTOLIC BLOOD PRESSURE: 71 MMHG | BODY MASS INDEX: 51.91 KG/M2 | TEMPERATURE: 98.2 F | HEART RATE: 82 BPM | WEIGHT: 293 LBS | SYSTOLIC BLOOD PRESSURE: 147 MMHG | HEIGHT: 63 IN

## 2025-01-16 DIAGNOSIS — G47.33 SLEEP APNEA, OBSTRUCTIVE: Primary | ICD-10-CM

## 2025-01-16 DIAGNOSIS — E66.813 CLASS 3 SEVERE OBESITY DUE TO EXCESS CALORIES WITHOUT SERIOUS COMORBIDITY WITH BODY MASS INDEX (BMI) OF 60.0 TO 69.9 IN ADULT: ICD-10-CM

## 2025-01-16 DIAGNOSIS — E66.01 CLASS 3 SEVERE OBESITY DUE TO EXCESS CALORIES WITHOUT SERIOUS COMORBIDITY WITH BODY MASS INDEX (BMI) OF 60.0 TO 69.9 IN ADULT: ICD-10-CM

## 2025-01-16 PROCEDURE — G8417 CALC BMI ABV UP PARAM F/U: HCPCS | Performed by: INTERNAL MEDICINE

## 2025-01-16 PROCEDURE — 3078F DIAST BP <80 MM HG: CPT | Performed by: INTERNAL MEDICINE

## 2025-01-16 PROCEDURE — 1036F TOBACCO NON-USER: CPT | Performed by: INTERNAL MEDICINE

## 2025-01-16 PROCEDURE — 3077F SYST BP >= 140 MM HG: CPT | Performed by: INTERNAL MEDICINE

## 2025-01-16 PROCEDURE — 3017F COLORECTAL CA SCREEN DOC REV: CPT | Performed by: INTERNAL MEDICINE

## 2025-01-16 PROCEDURE — G8428 CUR MEDS NOT DOCUMENT: HCPCS | Performed by: INTERNAL MEDICINE

## 2025-01-16 PROCEDURE — 99214 OFFICE O/P EST MOD 30 MIN: CPT | Performed by: INTERNAL MEDICINE

## 2025-01-16 PROCEDURE — 99211 OFF/OP EST MAY X REQ PHY/QHP: CPT

## 2025-01-16 NOTE — PROGRESS NOTES
ASSESSMENT/PLAN -       Problem 1 - LUC  HPI  - Diagnosed 21     Severe     Followed by Dr. Gaines (once yearly virtually); was supposed to see his NP 23; however, the office cancelled and she has not yet rescheduled     Wears BiPAP 7d/wk, full duration of sleep (9-10 hrs).      Daytime sleepiness the past week 7-8/10 (attributes it to anemia)  Assessment of LUC - Improved; excess weight is worsening her underlying airway obstruction and impairing her oxygenation  Plan -   Wt reduction per the plan below      Problem 2 - Obesity  HPI - See above Background for description   Weight  Date   347.6 lbs 21 (had lost 35 lbs prior to seeing me)     21  349.0 lbs home, started diet on this day   332.0 lbs 21  332.0 lbs home   321.0 lbs 22     308.8 lbs 22  306.4 lbs home   300.0 lbs 22  298.6 lbs home   286.0 lbs 22  284.2 lbs home (diagnosed with Covid 22)   274.2 lbs 22  273.4 lbs home   265.2 lbs 22  264.1 lbs home   259.4 lbs 10/11/22  257.4 lbs home   262.2 lbs 11/10/22  259.7 lbs home   262.0 lbs 22  258.2 lbs home   270.8 lbs 23  268.8 lbs home (new scales)   265.2 lbs 23  263.2 lbs home  Her dog  2/3/23. Had Norovirus (GI) 23 and Covid 2/10/23.    272.2 lbs 23  269.7 lbs home   -------  23  273.8 lbs home   282.2 lbs 23  279.9 lbs home Poor sleep, Sister from [place] stayed for three weeks. 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

## 2025-01-21 RX ORDER — MAGNESIUM 30 MG
30 TABLET ORAL NIGHTLY
COMMUNITY

## 2025-01-21 RX ORDER — M-VIT,TX,IRON,MINS/CALC/FOLIC 27MG-0.4MG
1 TABLET ORAL DAILY
COMMUNITY

## 2025-01-21 NOTE — PROGRESS NOTES
[facility] PRE-ADMISSION TESTING INSTRUCTIONS    The Preadmission Testing patient is instructed accordingly using the following criteria (check applicable):    ARRIVAL INSTRUCTIONS:  [x] Parking the day of Surgery is located in the Main Entrance lot.  Upon entering the door, make an immediate right to the surgery reception desk    [x] Bring photo ID and insurance card    [] Bring in a copy of Living will or Durable Power of  papers.    [x] Please be sure to arrange for a responsible adult to provide transportation to and from the hospital    [x] Please arrange for a responsible adult to be with you for the 24 hour period post procedure due to having anesthesia    [x] If you awake am of surgery not feeling well or have temperature >100 please call [phone]    GENERAL INSTRUCTIONS:    [x]  may have up to 8oz of water until 4 hours prior to surgery. No gum, no candy, no mints. NPO time:       [x] You may brush your teeth, do not swallow any toothpaste    [x] Take medications as instructed     [x] Stop herbal supplements and vitamins 5 days prior to procedure    [x] Follow preop dosing of blood thinners per physician instructions    [] Take 1/2 dose of evening insulin, but no insulin after midnight    [x] No oral diabetic medications after midnight    [x] If diabetic and have low blood sugar or feel symptomatic, take 1-2oz apple juice only    [] Bring inhalers day of surgery    [] Bring urine specimen day of surgery    [x] Shower or bath with soap, lather and rinse well, AM of Surgery, no lotion, powders or creams to surgical site    [x] Follow bowel prep as instructed per surgeon    [x] No tobacco products within 24 hours of surgery     [x] No alcohol or illegal drug use, marijuana included, within 24 hours of surgery.    [x] Jewelry, body piercing's, eyeglasses, contact lenses and dentures are not permitted into surgery (bring cases)      [x] Please do not wear any nail polish,  make up or hair products on the day of surgery    [x] You can expect a call the business day prior to procedure to notify you if your arrival time changes    [x] If you receive a survey after surgery we would greatly appreciate your comments    [] Parent/guardian of a minor must accompany their child and remain on the premises  the entire time they are under our care     [] Pediatric patients may bring favorite toy, blanket or comfort item with them    [] A caregiver or family member must remain with the patient during their stay if they are mentally handicapped, have dementia, disoriented or unable to use a call light or would be a safety concern if left unattended    [x] Please notify surgeon if you develop any illness between now and time of surgery (cold, cough, sore throat, fever, nausea, vomiting) or any signs of infections  including skin, wounds, and dental.    []  The Outpatient Pharmacy is available to fill your prescription here on your day of surgery, ask your preop nurse for details    [] Other instructions    EDUCATIONAL MATERIALS PROVIDED:    [] PAT Preoperative Education Packet/Booklet     [] Medication List    [] Transfusion bracelet applied with instructions    [] Shower with soap, lather and rinse well, and use CHG wipes provided the evening before surgery as instructed    [] Incentive spirometer with instructions

## 2025-01-22 ENCOUNTER — ANESTHESIA (OUTPATIENT)
Dept: ENDOSCOPY | Age: 60
End: 2025-01-22
Payer: COMMERCIAL

## 2025-01-22 ENCOUNTER — ANESTHESIA EVENT (OUTPATIENT)
Dept: ENDOSCOPY | Age: 60
End: 2025-01-22
Payer: COMMERCIAL

## 2025-01-22 ENCOUNTER — HOSPITAL ENCOUNTER (OUTPATIENT)
Age: 60
Setting detail: OUTPATIENT SURGERY
Discharge: HOME | End: 2025-01-22
Attending: SURGERY | Admitting: SURGERY
Payer: COMMERCIAL

## 2025-01-22 VITALS
OXYGEN SATURATION: 96 % | DIASTOLIC BLOOD PRESSURE: 67 MMHG | BODY MASS INDEX: 51.91 KG/M2 | HEIGHT: 63 IN | TEMPERATURE: 97.6 F | SYSTOLIC BLOOD PRESSURE: 142 MMHG | WEIGHT: 293 LBS | RESPIRATION RATE: 16 BRPM | HEART RATE: 60 BPM

## 2025-01-22 DIAGNOSIS — R11.2 NAUSEA AND VOMITING, UNSPECIFIED VOMITING TYPE: ICD-10-CM

## 2025-01-22 DIAGNOSIS — R10.9 ABDOMINAL PAIN, UNSPECIFIED ABDOMINAL LOCATION: ICD-10-CM

## 2025-01-22 DIAGNOSIS — K21.9 GASTROESOPHAGEAL REFLUX DISEASE, UNSPECIFIED WHETHER ESOPHAGITIS PRESENT: ICD-10-CM

## 2025-01-22 DIAGNOSIS — R10.13 EPIGASTRIC PAIN: ICD-10-CM

## 2025-01-22 DIAGNOSIS — D50.9 IRON DEFICIENCY ANEMIA, UNSPECIFIED IRON DEFICIENCY ANEMIA TYPE: ICD-10-CM

## 2025-01-22 LAB — GLUCOSE BLD-MCNC: 106 MG/DL (ref 74–99)

## 2025-01-22 PROCEDURE — 3700000000 HC ANESTHESIA ATTENDED CARE: Performed by: SURGERY

## 2025-01-22 PROCEDURE — 3609010400 HC COLONOSCOPY POLYPECTOMY HOT BIOPSY: Performed by: SURGERY

## 2025-01-22 PROCEDURE — 7100000011 HC PHASE II RECOVERY - ADDTL 15 MIN: Performed by: SURGERY

## 2025-01-22 PROCEDURE — 3609013000 HC EGD TRANSORAL CONTROL BLEEDING ANY METHOD: Performed by: SURGERY

## 2025-01-22 PROCEDURE — 3700000001 HC ADD 15 MINUTES (ANESTHESIA): Performed by: SURGERY

## 2025-01-22 PROCEDURE — 2709999900 HC NON-CHARGEABLE SUPPLY: Performed by: SURGERY

## 2025-01-22 PROCEDURE — 88305 TISSUE EXAM BY PATHOLOGIST: CPT

## 2025-01-22 PROCEDURE — 45380 COLONOSCOPY AND BIOPSY: CPT | Performed by: SURGERY

## 2025-01-22 PROCEDURE — 82962 GLUCOSE BLOOD TEST: CPT

## 2025-01-22 PROCEDURE — 2580000003 HC RX 258: Performed by: NURSE ANESTHETIST, CERTIFIED REGISTERED

## 2025-01-22 PROCEDURE — C1889 IMPLANT/INSERT DEVICE, NOC: HCPCS | Performed by: SURGERY

## 2025-01-22 PROCEDURE — 43251 EGD REMOVE LESION SNARE: CPT | Performed by: SURGERY

## 2025-01-22 PROCEDURE — 6360000002 HC RX W HCPCS: Performed by: NURSE ANESTHETIST, CERTIFIED REGISTERED

## 2025-01-22 PROCEDURE — 7100000010 HC PHASE II RECOVERY - FIRST 15 MIN: Performed by: SURGERY

## 2025-01-22 PROCEDURE — 45385 COLONOSCOPY W/LESION REMOVAL: CPT | Performed by: SURGERY

## 2025-01-22 DEVICE — WORKING LENGTH 235CM, WORKING CHANNEL 2.8MM
Type: IMPLANTABLE DEVICE | Status: FUNCTIONAL
Brand: RESOLUTION 360 CLIP

## 2025-01-22 RX ORDER — SODIUM CHLORIDE 9 MG/ML
INJECTION, SOLUTION INTRAVENOUS PRN
Status: DISCONTINUED | OUTPATIENT
Start: 2025-01-22 | End: 2025-01-22 | Stop reason: HOSPADM

## 2025-01-22 RX ORDER — SODIUM CHLORIDE 9 MG/ML
INJECTION, SOLUTION INTRAVENOUS
Status: DISCONTINUED | OUTPATIENT
Start: 2025-01-22 | End: 2025-01-22 | Stop reason: SDUPTHER

## 2025-01-22 RX ORDER — LIDOCAINE HYDROCHLORIDE 20 MG/ML
INJECTION, SOLUTION EPIDURAL; INFILTRATION; INTRACAUDAL; PERINEURAL
Status: DISCONTINUED | OUTPATIENT
Start: 2025-01-22 | End: 2025-01-22 | Stop reason: SDUPTHER

## 2025-01-22 RX ORDER — ONDANSETRON 2 MG/ML
INJECTION INTRAMUSCULAR; INTRAVENOUS
Status: DISCONTINUED | OUTPATIENT
Start: 2025-01-22 | End: 2025-01-22 | Stop reason: SDUPTHER

## 2025-01-22 RX ORDER — SODIUM CHLORIDE 0.9 % (FLUSH) 0.9 %
5-40 SYRINGE (ML) INJECTION EVERY 12 HOURS SCHEDULED
Status: DISCONTINUED | OUTPATIENT
Start: 2025-01-22 | End: 2025-01-22 | Stop reason: HOSPADM

## 2025-01-22 RX ORDER — PROPOFOL 10 MG/ML
INJECTION, EMULSION INTRAVENOUS
Status: DISCONTINUED | OUTPATIENT
Start: 2025-01-22 | End: 2025-01-22 | Stop reason: SDUPTHER

## 2025-01-22 RX ORDER — SODIUM CHLORIDE 0.9 % (FLUSH) 0.9 %
5-40 SYRINGE (ML) INJECTION PRN
Status: DISCONTINUED | OUTPATIENT
Start: 2025-01-22 | End: 2025-01-22 | Stop reason: HOSPADM

## 2025-01-22 RX ORDER — GLYCOPYRROLATE 0.2 MG/ML
INJECTION INTRAMUSCULAR; INTRAVENOUS
Status: DISCONTINUED | OUTPATIENT
Start: 2025-01-22 | End: 2025-01-22 | Stop reason: SDUPTHER

## 2025-01-22 RX ADMIN — ONDANSETRON 4 MG: 2 INJECTION, SOLUTION INTRAMUSCULAR; INTRAVENOUS at 10:01

## 2025-01-22 RX ADMIN — LIDOCAINE HYDROCHLORIDE 100 MG: 20 INJECTION, SOLUTION EPIDURAL; INFILTRATION; INTRACAUDAL; PERINEURAL at 10:04

## 2025-01-22 RX ADMIN — GLYCOPYRROLATE 0.1 MG: 0.2 INJECTION INTRAMUSCULAR; INTRAVENOUS at 10:04

## 2025-01-22 RX ADMIN — PROPOFOL 760 MG: 10 INJECTION, EMULSION INTRAVENOUS at 10:04

## 2025-01-22 RX ADMIN — SODIUM CHLORIDE: 9 INJECTION, SOLUTION INTRAVENOUS at 09:55

## 2025-01-22 ASSESSMENT — PAIN SCALES - GENERAL
PAINLEVEL_OUTOF10: 0

## 2025-01-22 ASSESSMENT — PAIN - FUNCTIONAL ASSESSMENT: PAIN_FUNCTIONAL_ASSESSMENT: 0-10

## 2025-01-22 NOTE — ANESTHESIA POSTPROCEDURE EVALUATION
Department of Anesthesiology  Postprocedure Note    Patient: Floresita Flynn  MRN: [id number]  YOB: 1965  Date of evaluation: 1/22/2025    Procedure Summary       Date: 01/22/25 Room / Location: [place] [address] / [facility]    Anesthesia Start: 0955 Anesthesia Stop: 1049    Procedures:       ESOPHAGOGASTRODUODENOSCOPY POLYP SNARE/ HOT      COLONOSCOPY polypectomy snare/biopsy hot      ESOPHAGOGASTRODUODENOSCOPY CONTROL HEMORRHAGE      COLONOSCOPY BIOPSY Diagnosis:       Abdominal pain, unspecified abdominal location      Nausea and vomiting, unspecified vomiting type      (Abdominal pain, unspecified abdominal location [R10.9])      (Nausea and vomiting, unspecified vomiting type [R11.2])    Surgeons: Nav Okeefe DO Responsible Provider: Woodrow Amaya MD    Anesthesia Type: MAC ASA Status: 4            Anesthesia Type: MAC    Duong Phase I: Duong Score: 10    Duong Phase II: Duong Score: 10    Anesthesia Post Evaluation    Patient location during evaluation: PACU  Patient participation: complete - patient participated  Level of consciousness: awake and alert  Airway patency: patent  Nausea & Vomiting: no nausea and no vomiting  Cardiovascular status: hemodynamically stable  Respiratory status: acceptable  Hydration status: euvolemic  There was medical reason for not using a multimodal analgesia pain management approach.Pain management: adequate    No notable events documented.

## 2025-01-22 NOTE — DISCHARGE INSTRUCTIONS
[facility] Colonoscopy PROCEDURE DISCHARGE INSTRUCTIONS  You may be drowsy or lightheaded after receiving sedation or anesthesia.    A responsible person should be with you for the next 24 hours.    Please follow the instructions checked below:    DIET INSTRUCTIONS:  [x]Start with light diet and progress to your normal diet as you feel like eating. If you experience nausea or repeated episodes of vomiting which persist beyond 12-24 hours, notify your doctor.  []Other     ACTIVITY INSTRUCTIONS:  [x]Rest today. Increase activity as tolerated    []No heavy lifting or strenuous activity     [x]No driving for today  []Other      MEDICATION INSTRUCTIONS:    []Prescriptions sent with you.  Use as directed.  When taking pain medications, you may experience dizziness or drowsiness.  Do not drink alcohol or drive when taking these medications.  [x]Continue preop medications                               Post-procedure Care   If any tissue was removed:   It will be sent to a lab to be examined. It may take 1-2 weeks for results. The doctor will usually give an initial report after the scope is removed. Other tests may be recommended.   A small amount of bleeding may occur during the first few days after the procedure.     When you return home after the procedure, be sure to follow your doctor's instructions, which may include:   Resume medicines as instructed by your doctor.   Resume normal diet, unless directed otherwise by your doctor.   The sedative will make you drowsy. Avoid driving, operating machinery, or making important decisions for the rest of the day.   Rest for the remainder of the day.     After arriving home, contact your doctor if any of the following occurs:   Bleeding from your rectum, notify your doctor if you pass a teaspoonful of blood or more.   Black, tarry stools   Severe abdominal pain   Hard, swollen abdomen   Signs of infection, including fever or chills   Inability to pass gas or

## 2025-01-22 NOTE — INTERVAL H&P NOTE
Update History & Physical    The patient's History and Physical of January 8, 2025 was reviewed with the patient and I examined the patient. There was no change. The surgical site was confirmed by the patient and me.     Plan: The risks, benefits, expected outcome, and alternative to the recommended procedure have been discussed with the patient. Patient understands and wants to proceed with the procedure.     Electronically signed by Nav Okeefe DO on 1/22/2025 at 9:35 AM

## 2025-01-22 NOTE — ANESTHESIA PRE PROCEDURE
Department of Anesthesiology  Preprocedure Note       Name:  Floresita Flynn   Age:  59 y.o.  :  1965                                          MRN:  [id number]         Date:  2025      Surgeon: Surgeon(s):  Nav Okeefe DO    Procedure: Procedure(s):  ESOPHAGOGASTRODUODENOSCOPY  COLONOSCOPY DIAGNOSTIC   +++SHELLFISH ALLERGY+++   ++IODINE ALLERGY++    Medications prior to admission:   Prior to Admission medications    Medication Sig Start Date End Date Taking? Authorizing Provider   Multiple Vitamins-Minerals (THERAPEUTIC MULTIVITAMIN-MINERALS) tablet Take 1 tablet by mouth daily   Yes ProviderAngel MD   magnesium 30 MG tablet Take 1 tablet by mouth nightly   Yes Provider, MD Angel   sucralfate (CARAFATE) 1 GM tablet Take 1 tablet by mouth 4 times daily 25   Nav Okeefe DO   venlafaxine (EFFEXOR XR) 37.5 MG extended release capsule Take 1 capsule by mouth daily along with a 75 mg tablet 24   Jacques Hoff MD   buPROPion (WELLBUTRIN XL) 150 MG extended release tablet Take 1 tablet by mouth every morning 24   Will Yates DO   levothyroxine (SYNTHROID) 150 MCG tablet Take 1 tablet by mouth daily 24   Will Yates DO   metFORMIN (GLUCOPHAGE) 500 MG tablet Take 1 tablet by mouth 2 times daily (with meals) 24   Will Yates DO   torsemide (DEMADEX) 10 MG tablet Take 1 tablet by mouth daily 24   Will Yates DO   potassium chloride (KLOR-CON M20) 20 MEQ extended release tablet Take 1 tablet by mouth daily 24   Will Yates DO   simvastatin (ZOCOR) 40 MG tablet TAKE 1 TABLET BY MOUTH NIGHTLY 24   Will Yates DO   atenolol (TENORMIN) 25 MG tablet Take 1 tablet by mouth 3 times daily  Patient taking differently: Take 1 tablet by mouth 3 times daily 50mg in AM , 25 mg in PM 24   Will Yates DO   amitriptyline (ELAVIL) 25 MG tablet Take 1 tablet by mouth nightly 24   Will Yates, DO

## 2025-01-23 ENCOUNTER — TELEPHONE (OUTPATIENT)
Dept: SURGERY | Age: 60
End: 2025-01-23

## 2025-01-27 LAB — SURGICAL PATHOLOGY REPORT: NORMAL

## 2025-02-07 ENCOUNTER — OFFICE VISIT (OUTPATIENT)
Dept: SURGERY | Age: 60
End: 2025-02-07
Payer: COMMERCIAL

## 2025-02-07 VITALS
SYSTOLIC BLOOD PRESSURE: 118 MMHG | HEIGHT: 63 IN | DIASTOLIC BLOOD PRESSURE: 64 MMHG | RESPIRATION RATE: 18 BRPM | TEMPERATURE: 98.1 F | WEIGHT: 293 LBS | HEART RATE: 70 BPM | OXYGEN SATURATION: 99 % | BODY MASS INDEX: 51.91 KG/M2

## 2025-02-07 DIAGNOSIS — D12.6 TUBULAR ADENOMA OF COLON: ICD-10-CM

## 2025-02-07 DIAGNOSIS — K31.7 GASTRIC POLYP: Primary | ICD-10-CM

## 2025-02-07 PROCEDURE — 3017F COLORECTAL CA SCREEN DOC REV: CPT | Performed by: SURGERY

## 2025-02-07 PROCEDURE — 3078F DIAST BP <80 MM HG: CPT | Performed by: SURGERY

## 2025-02-07 PROCEDURE — 99213 OFFICE O/P EST LOW 20 MIN: CPT | Performed by: SURGERY

## 2025-02-07 PROCEDURE — 3074F SYST BP LT 130 MM HG: CPT | Performed by: SURGERY

## 2025-02-07 PROCEDURE — 1036F TOBACCO NON-USER: CPT | Performed by: SURGERY

## 2025-02-07 PROCEDURE — G8427 DOCREV CUR MEDS BY ELIG CLIN: HCPCS | Performed by: SURGERY

## 2025-02-07 PROCEDURE — G8417 CALC BMI ABV UP PARAM F/U: HCPCS | Performed by: SURGERY

## 2025-02-07 NOTE — PROGRESS NOTES
[facility] Surgery Clinic Note    Assessment/Plan:    Status post EGD with findings of large gastric polyp s/p resection came back as hyperplastic polyp. Status post colonoscopy with findings of 2x tubular adenomas and 1x hyperplastic polyp.    She is still having random attacks of severe LUQ pain that do not seem to correlate with anything in particular. She has had extensive workup over the past several years and there has been no diagnosis to explain her symptoms.    Discussed results with patient and explained that, per guidelines, they will be due for repeat colonoscopy in 5 years.     PCP: Will Yates DO    HPI: Floresita Flynn is a 59 y.o. female who recently underwent EGD and colonoscopy for abdominal pain, anemia, and GERD. They were found to have a gastric polyp and several colon polyps. Since their procedure, she has still been having the intermittent pains in her LUQ associated with nausea, dry heaves, vomiting.     Review of Systems   All other systems reviewed and are negative.      Past Medical History:   Diagnosis Date    Anxiety     Coronary arteriosclerosis     Degenerative joint disease     involving multiple joints    Depression     Hyperlipidemia     Hypothyroidism     MI, old     last saw Dr Nazario 2022 ECHO  EF 55-60%    Obesity     Palpitations     Peptic reflux disease     Sleep apnea     compliant with CPAP    Type 2 diabetes mellitus without complication, without long-term current use of insulin ([facility]) 2022       Past Surgical History:   Procedure Laterality Date     SECTION  1994     SECTION  1996    COLONOSCOPY N/A 2025    COLONOSCOPY polypectomy snare/biopsy hot performed by Nav Okeefe DO at [facility] ENDOSCOPY    DIAGNOSTIC CARDIAC CATH LAB PROCEDURE      ENDOMETRIAL ABLATION      LIPOMA RESECTION      TONSILLECTOMY  1972    UPPER GASTROINTESTINAL ENDOSCOPY N/A 2025    ESOPHAGOGASTRODUODENOSCOPY POLYP SNARE/ HOT performed

## 2025-02-08 DIAGNOSIS — K29.50 OTHER CHRONIC GASTRITIS WITHOUT HEMORRHAGE: ICD-10-CM

## 2025-02-13 DIAGNOSIS — K29.50 OTHER CHRONIC GASTRITIS WITHOUT HEMORRHAGE: ICD-10-CM

## 2025-02-13 DIAGNOSIS — I10 ESSENTIAL HYPERTENSION: Chronic | ICD-10-CM

## 2025-02-13 RX ORDER — VALSARTAN 80 MG/1
80 TABLET ORAL DAILY
Qty: 90 TABLET | Refills: 5 | Status: SHIPPED | OUTPATIENT
Start: 2025-02-13

## 2025-02-13 RX ORDER — PANTOPRAZOLE SODIUM 40 MG/1
40 TABLET, DELAYED RELEASE ORAL DAILY
Qty: 90 TABLET | Refills: 3 | Status: SHIPPED | OUTPATIENT
Start: 2025-02-13 | End: 2026-02-14

## 2025-02-15 RX ORDER — PANTOPRAZOLE SODIUM 40 MG/1
40 TABLET, DELAYED RELEASE ORAL DAILY
Qty: 90 TABLET | Refills: 3 | OUTPATIENT
Start: 2025-02-15

## 2025-03-06 ENCOUNTER — OFFICE VISIT (OUTPATIENT)
Dept: BARIATRICS/WEIGHT MGMT | Age: 60
End: 2025-03-06
Payer: COMMERCIAL

## 2025-03-06 VITALS
DIASTOLIC BLOOD PRESSURE: 76 MMHG | BODY MASS INDEX: 51.91 KG/M2 | HEART RATE: 64 BPM | WEIGHT: 293 LBS | TEMPERATURE: 97.1 F | SYSTOLIC BLOOD PRESSURE: 144 MMHG | HEIGHT: 63 IN

## 2025-03-06 DIAGNOSIS — E66.813 CLASS 3 SEVERE OBESITY DUE TO EXCESS CALORIES WITHOUT SERIOUS COMORBIDITY WITH BODY MASS INDEX (BMI) OF 60.0 TO 69.9 IN ADULT: ICD-10-CM

## 2025-03-06 DIAGNOSIS — E66.01 CLASS 3 SEVERE OBESITY DUE TO EXCESS CALORIES WITHOUT SERIOUS COMORBIDITY WITH BODY MASS INDEX (BMI) OF 60.0 TO 69.9 IN ADULT: ICD-10-CM

## 2025-03-06 DIAGNOSIS — G47.33 SLEEP APNEA, OBSTRUCTIVE: Primary | ICD-10-CM

## 2025-03-06 PROCEDURE — G8428 CUR MEDS NOT DOCUMENT: HCPCS | Performed by: INTERNAL MEDICINE

## 2025-03-06 PROCEDURE — 3078F DIAST BP <80 MM HG: CPT | Performed by: INTERNAL MEDICINE

## 2025-03-06 PROCEDURE — G8417 CALC BMI ABV UP PARAM F/U: HCPCS | Performed by: INTERNAL MEDICINE

## 2025-03-06 PROCEDURE — 99211 OFF/OP EST MAY X REQ PHY/QHP: CPT

## 2025-03-06 PROCEDURE — 99213 OFFICE O/P EST LOW 20 MIN: CPT | Performed by: INTERNAL MEDICINE

## 2025-03-06 PROCEDURE — 1036F TOBACCO NON-USER: CPT | Performed by: INTERNAL MEDICINE

## 2025-03-06 PROCEDURE — 3017F COLORECTAL CA SCREEN DOC REV: CPT | Performed by: INTERNAL MEDICINE

## 2025-03-06 PROCEDURE — 3077F SYST BP >= 140 MM HG: CPT | Performed by: INTERNAL MEDICINE

## 2025-03-06 NOTE — PROGRESS NOTES
supplements  Appetite suppressant - Venlafaxine XR 75 mg daily, appetite suppression: 7-8/10, side effects: none (+ mood stabilization)  Exercise: none (severe OA of knees)  Assessment of Obesity -  Improved; excellent rate of weight loss; cont the same plan and cont venlafaxine  Plan -   Count every calorie every day    Limit calories to 1500 rachel/day (this is a target)    Limit sweets to one day per month    Limit salty snacks to 100 rachel/day    Do not drink any sugar sweetened beverages    Limit restaurant food to once every week    Protein intake should be 75 grams/day    Fiber intake should be 22 grams/day     Cont venlafaxine ER to one 75 mg tablet + one 37.5 mg tablet every day     Make sure that water intake is >64 oz/day  ______________________      Follow up  See me in May 1 for 15 min accountability/adherence check        --Jacques Hoff MD on 3/6/2025 at 7:59 AM

## 2025-03-06 NOTE — PATIENT INSTRUCTIONS
Instructions:    Count every calorie every day    Limit calories to 1500 rachel/day (this is a target)    Limit sweets to one day per month    Limit salty snacks to 100 rachel/day    Do not drink any sugar sweetened beverages    Limit restaurant food to once every week    Protein intake should be 75 grams/day    Fiber intake should be 22 grams/day     Cont venlafaxine ER to one 75 mg tablet + one 37.5 mg tablet every day     Make sure that water intake is >64 oz/day  ______________________      Follow up  May 1

## 2025-05-01 ENCOUNTER — OFFICE VISIT (OUTPATIENT)
Dept: BARIATRICS/WEIGHT MGMT | Age: 60
End: 2025-05-01
Payer: COMMERCIAL

## 2025-05-01 VITALS
DIASTOLIC BLOOD PRESSURE: 60 MMHG | WEIGHT: 293 LBS | TEMPERATURE: 97.1 F | HEART RATE: 63 BPM | SYSTOLIC BLOOD PRESSURE: 134 MMHG | HEIGHT: 63 IN | BODY MASS INDEX: 51.91 KG/M2

## 2025-05-01 DIAGNOSIS — G47.33 OBSTRUCTIVE SLEEP APNEA SYNDROME: Primary | ICD-10-CM

## 2025-05-01 DIAGNOSIS — I25.10 CORONARY ARTERIOSCLEROSIS: ICD-10-CM

## 2025-05-01 DIAGNOSIS — E74.39 GLUCOSE INTOLERANCE: ICD-10-CM

## 2025-05-01 DIAGNOSIS — E66.813 CLASS 3 SEVERE OBESITY DUE TO EXCESS CALORIES WITHOUT SERIOUS COMORBIDITY WITH BODY MASS INDEX (BMI) OF 50.0 TO 59.9 IN ADULT (HCC): ICD-10-CM

## 2025-05-01 DIAGNOSIS — E88.819 INSULIN RESISTANCE: ICD-10-CM

## 2025-05-01 DIAGNOSIS — R73.03 PRE-DIABETES: ICD-10-CM

## 2025-05-01 PROCEDURE — G8428 CUR MEDS NOT DOCUMENT: HCPCS | Performed by: INTERNAL MEDICINE

## 2025-05-01 PROCEDURE — 99211 OFF/OP EST MAY X REQ PHY/QHP: CPT

## 2025-05-01 PROCEDURE — 3078F DIAST BP <80 MM HG: CPT | Performed by: INTERNAL MEDICINE

## 2025-05-01 PROCEDURE — 1036F TOBACCO NON-USER: CPT | Performed by: INTERNAL MEDICINE

## 2025-05-01 PROCEDURE — 99214 OFFICE O/P EST MOD 30 MIN: CPT | Performed by: INTERNAL MEDICINE

## 2025-05-01 PROCEDURE — G8417 CALC BMI ABV UP PARAM F/U: HCPCS | Performed by: INTERNAL MEDICINE

## 2025-05-01 PROCEDURE — 3075F SYST BP GE 130 - 139MM HG: CPT | Performed by: INTERNAL MEDICINE

## 2025-05-01 PROCEDURE — 3017F COLORECTAL CA SCREEN DOC REV: CPT | Performed by: INTERNAL MEDICINE

## 2025-05-01 NOTE — PATIENT INSTRUCTIONS
Count every calorie every day    Limit calories to 1500 rachel/day (this is a target)    Limit sweets to one day per month    Limit salty snacks to 100 rachel/day    Do not drink any sugar sweetened beverages    Limit restaurant food to once every week    Protein intake should be 75 grams/day    Fiber intake should be 22 grams/day     Cont venlafaxine ER to one 75 mg tablet + one 37.5 mg tablet every day     Make sure that water intake is >64 oz/day    Begin Mounjaro by injecting 2.5 mg under the skin once each week for four weeks. Then increase to 5 mg once each week.    ______________________      Follow up  See me June 12

## 2025-05-01 NOTE — PROGRESS NOTES
CC:  LUC, Obesity    BACKGROUND -   Last visit: 03/06/25  First visit: 11/18/21    Obesity   Began in childhood  Initial BMI 61.6, Wt 347.6 lbs, Ht 5' 3\"  HS Grad wt 190 lbs   Lowest   wt  165 lbs age 23  Highest  wt  416 lbs  Pattern of wt gain: grad overall, but with sig losses followed by rapid regain  Wt change past yr: gained 40 lbs, then down 35 lbs  Most wt lost: 94 lbs (food logging + exercise)  Other diets attempted: WW, SF, Meridia, Fad    Desire to lose wt: 10/10  Prob posed by appetite: 7/10    Initial Diet:    Number of meals per day - 4    Number of snacks per day - 6    Meal volume - 12\" plate, sometimes seconds    Fast food/convenience store - 6-8x/week    Restaurants (not fast food) - 1-2x/week   Sweets - 7d/week (8-10 oreos/day or other chocolate snacks/desserts)   Chips - 5-6d/week (8 oz bag)   Crackers/pretzels - 2d/week (2 cups Cheezits)   Nuts - 0d/week   Peanut Butter - 3d/week (4 Tbsp on bread as a meal)   Popcorn - 2d/week (reg sized MW bag)   Dried fruit - 0d/week   Whole fruit - 5-6d/week (1 serving)   Breakfast cereal - 0-1d/week   Granola/Protein/Energy bar - 0d/week   Sugar sweetened beverages - 40 oz reg soda/d, SB Trenta Refresher Tea 4x/wk (190 each)   Protein - No supplements   Fiber - No supplements     Exercise:    Gym membership - none    Walking - none    Running - none    Resistance - none    Aerobic class - none    ______________________    PFSH -  Past Medical History:   Diagnosis Date    Anxiety     Coronary arteriosclerosis     Degenerative joint disease     involving multiple joints    Depression     Hyperlipidemia     Hypothyroidism     MI, old 1998    last saw Dr Nazario 1/2022 ECHO 2021 EF 55-60%    Obesity     Palpitations     Peptic reflux disease     Sleep apnea     compliant with CPAP    Type 2 diabetes mellitus without complication, without long-term current use of insulin ([facility]) 02/17/2022     Current Outpatient Medications   Medication Sig Dispense Refill

## 2025-05-02 ENCOUNTER — OFFICE VISIT (OUTPATIENT)
Dept: SURGERY | Age: 60
End: 2025-05-02
Payer: COMMERCIAL

## 2025-05-02 VITALS
BODY MASS INDEX: 51.91 KG/M2 | HEART RATE: 72 BPM | WEIGHT: 293 LBS | OXYGEN SATURATION: 99 % | RESPIRATION RATE: 18 BRPM | DIASTOLIC BLOOD PRESSURE: 90 MMHG | HEIGHT: 63 IN | TEMPERATURE: 97.3 F | SYSTOLIC BLOOD PRESSURE: 136 MMHG

## 2025-05-02 DIAGNOSIS — R10.13 EPIGASTRIC PAIN: ICD-10-CM

## 2025-05-02 DIAGNOSIS — K43.2 INCISIONAL HERNIA, WITHOUT OBSTRUCTION OR GANGRENE: Primary | ICD-10-CM

## 2025-05-02 PROCEDURE — 3017F COLORECTAL CA SCREEN DOC REV: CPT | Performed by: SURGERY

## 2025-05-02 PROCEDURE — G8427 DOCREV CUR MEDS BY ELIG CLIN: HCPCS | Performed by: SURGERY

## 2025-05-02 PROCEDURE — 1036F TOBACCO NON-USER: CPT | Performed by: SURGERY

## 2025-05-02 PROCEDURE — 3080F DIAST BP >= 90 MM HG: CPT | Performed by: SURGERY

## 2025-05-02 PROCEDURE — 99213 OFFICE O/P EST LOW 20 MIN: CPT | Performed by: SURGERY

## 2025-05-02 PROCEDURE — 3075F SYST BP GE 130 - 139MM HG: CPT | Performed by: SURGERY

## 2025-05-02 PROCEDURE — G8417 CALC BMI ABV UP PARAM F/U: HCPCS | Performed by: SURGERY

## 2025-05-02 NOTE — PROGRESS NOTES
[facility] General Surgery Clinic Note    Assessment/Plan:     Diagnosis Orders   1. Incisional hernia, without obstruction or gangrene        2. Epigastric pain            She has been losing weight successfully, she just start Monjauro yesterday as well. However, her BMI is still 54.57.    Will see her back in 1 month and assess her weight loss progress and if she is continuing to lose weight we will schedule for incisional hernia repair.      Chief Complaint   Patient presents with    Other     discuss hernia  Pt states she's had it for a long time, she's noticed in the last months that when she's lifting or standing a lot and any pressure it causes pain. She can feel hernia when she lays down and presses.        PCP: Will Yates DO    HPI: Floresita Flynn is a 59 y.o. female here for hernia evaluation. Patient has been having chronic issues with abdominal pain, nausea, and vomiting. Recently, she noticed that if she gets the pain she able to lay down and massage her hernia, partially reducing it, and the pain goes away immediately. She had never realized this in the past.     Review of Systems   All other systems reviewed and are negative.        Past Medical History:   Diagnosis Date    Anxiety     Coronary arteriosclerosis     Degenerative joint disease     involving multiple joints    Depression     Glucose intolerance     Hyperlipidemia     Hypothyroidism     Insulin resistance     MI, old     last saw Dr Nazario 2022 ECHO  EF 55-60%    Obesity     Palpitations     Peptic reflux disease     Pre-diabetes     Sleep apnea     compliant with CPAP       Past Surgical History:   Procedure Laterality Date     SECTION  1994     SECTION  1996    COLONOSCOPY N/A 2025    COLONOSCOPY polypectomy snare/biopsy hot performed by Nav Okeefe DO at [facility] ENDOSCOPY    DIAGNOSTIC CARDIAC CATH LAB PROCEDURE      ENDOMETRIAL ABLATION      LIPOMA RESECTION      TONSILLECTOMY  1972

## 2025-06-12 ENCOUNTER — OFFICE VISIT (OUTPATIENT)
Age: 60
End: 2025-06-12
Payer: COMMERCIAL

## 2025-06-12 VITALS
HEIGHT: 63 IN | WEIGHT: 293 LBS | SYSTOLIC BLOOD PRESSURE: 120 MMHG | TEMPERATURE: 97.1 F | BODY MASS INDEX: 51.91 KG/M2 | HEART RATE: 63 BPM | DIASTOLIC BLOOD PRESSURE: 60 MMHG

## 2025-06-12 DIAGNOSIS — E66.813 CLASS 3 SEVERE OBESITY DUE TO EXCESS CALORIES WITHOUT SERIOUS COMORBIDITY WITH BODY MASS INDEX (BMI) OF 50.0 TO 59.9 IN ADULT (HCC): ICD-10-CM

## 2025-06-12 DIAGNOSIS — G47.33 OBSTRUCTIVE SLEEP APNEA SYNDROME: Primary | ICD-10-CM

## 2025-06-12 DIAGNOSIS — K59.03 DRUG-INDUCED CONSTIPATION: ICD-10-CM

## 2025-06-12 PROCEDURE — G8417 CALC BMI ABV UP PARAM F/U: HCPCS | Performed by: INTERNAL MEDICINE

## 2025-06-12 PROCEDURE — 3074F SYST BP LT 130 MM HG: CPT | Performed by: INTERNAL MEDICINE

## 2025-06-12 PROCEDURE — 99214 OFFICE O/P EST MOD 30 MIN: CPT | Performed by: INTERNAL MEDICINE

## 2025-06-12 PROCEDURE — G8428 CUR MEDS NOT DOCUMENT: HCPCS | Performed by: INTERNAL MEDICINE

## 2025-06-12 PROCEDURE — 1036F TOBACCO NON-USER: CPT | Performed by: INTERNAL MEDICINE

## 2025-06-12 PROCEDURE — 3078F DIAST BP <80 MM HG: CPT | Performed by: INTERNAL MEDICINE

## 2025-06-12 PROCEDURE — 3017F COLORECTAL CA SCREEN DOC REV: CPT | Performed by: INTERNAL MEDICINE

## 2025-06-12 RX ORDER — DOCUSATE SODIUM 100 MG/1
100 CAPSULE, LIQUID FILLED ORAL 2 TIMES DAILY
Qty: 60 CAPSULE | Refills: 0 | Status: SHIPPED | OUTPATIENT
Start: 2025-06-12 | End: 2025-07-12

## 2025-06-12 RX ORDER — POLYETHYLENE GLYCOL 3350 17 G/17G
17 POWDER, FOR SOLUTION ORAL DAILY
Qty: 1700 G | Refills: 1 | Status: SHIPPED | OUTPATIENT
Start: 2025-06-12 | End: 2025-12-29

## 2025-06-12 NOTE — PATIENT INSTRUCTIONS
Count every calorie every day    Limit calories to 1500 rachel/day (this is a target)    Limit sweets to one day per month    Limit salty snacks to 100 rachel/day    Do not drink any sugar sweetened beverages    Limit restaurant food to once every week    Protein intake should be 75 grams/day    Fiber intake should be 22 grams/day     Cont venlafaxine ER to one 75 mg tablet + one 37.5 mg tablet every day     Make sure that water intake is >64 oz/day    Cont Mounjaro 5 mg once each week.    Take Colace 100 mg, one capsule twice daily    Take 1 heaping Tbsp of Myralax daily as needed    ______________________      Follow up  One month as scheduled

## 2025-06-12 NOTE — PROGRESS NOTES
moved out 09/21/24; then lost 20 lbs  01/16/25 Receiving iron infusions now for her anemia  03/06/25 Feels much better after having the two iron infusions  Update:   Since last visit, had Mother's day, her birthday, two graduations, two weddings (one out of town)  Plan: counting-based with rules of elimination/restriction  Adherence: good per pt self-report (no change)  Calorie monitoring: Counting 7d/wk, usual intake 1820 Edwardo/d  Protein: 1 Premier shake + 1 Fit Crunch bar daily, 1 LFHP Greek yogurt/d  Fiber: 2 Fiber gummies daily, 1 cup Fiber One 2d/wk  Appetite suppressant - Venlafaxine XR 75 mg daily, Mounjaro 5mg weekly (only one dose),appetite suppression: 7-8/10, side effects: none (+ mood stabilization)  Exercise: none (severe OA of knees)  Assessment of Obesity -  Improved;  rate of weight loss slowed, but due to having over one week total of days of exception from celebrations and holidays; cont the same plan and cont venlafaxine and Mounjaro; needs to use a stool softener and Myralax for constipation  Plan -   Count every calorie every day    Limit calories to 1500 edwardo/day (this is a target)    Limit sweets to one day per month    Limit salty snacks to 100 edwardo/day    Do not drink any sugar sweetened beverages    Limit restaurant food to once every week    Protein intake should be 75 grams/day    Fiber intake should be 22 grams/day     Cont venlafaxine ER to one 75 mg tablet + one 37.5 mg tablet every day     Make sure that water intake is >64 oz/day    Cont Mounjaro 5 mg once each week.    Take Colace 100 mg, one capsule twice daily    Take 1 heaping Tbsp of Myralax daily as needed    ______________________      Follow up  One month as scheduled    Medication management:   Zay      --Jacques Hoff MD on 6/12/2025 at 7:44 AM

## 2025-07-24 ENCOUNTER — OFFICE VISIT (OUTPATIENT)
Age: 60
End: 2025-07-24
Payer: COMMERCIAL

## 2025-07-24 VITALS
TEMPERATURE: 97.3 F | HEART RATE: 71 BPM | SYSTOLIC BLOOD PRESSURE: 122 MMHG | HEIGHT: 63 IN | WEIGHT: 293 LBS | BODY MASS INDEX: 51.91 KG/M2 | DIASTOLIC BLOOD PRESSURE: 68 MMHG

## 2025-07-24 DIAGNOSIS — E66.813 CLASS 3 SEVERE OBESITY DUE TO EXCESS CALORIES WITHOUT SERIOUS COMORBIDITY WITH BODY MASS INDEX (BMI) OF 50.0 TO 59.9 IN ADULT (HCC): ICD-10-CM

## 2025-07-24 DIAGNOSIS — G47.33 OBSTRUCTIVE SLEEP APNEA SYNDROME: Primary | ICD-10-CM

## 2025-07-24 PROCEDURE — G8428 CUR MEDS NOT DOCUMENT: HCPCS | Performed by: INTERNAL MEDICINE

## 2025-07-24 PROCEDURE — G8417 CALC BMI ABV UP PARAM F/U: HCPCS | Performed by: INTERNAL MEDICINE

## 2025-07-24 PROCEDURE — 1036F TOBACCO NON-USER: CPT | Performed by: INTERNAL MEDICINE

## 2025-07-24 PROCEDURE — 99214 OFFICE O/P EST MOD 30 MIN: CPT | Performed by: INTERNAL MEDICINE

## 2025-07-24 PROCEDURE — 3078F DIAST BP <80 MM HG: CPT | Performed by: INTERNAL MEDICINE

## 2025-07-24 PROCEDURE — 3074F SYST BP LT 130 MM HG: CPT | Performed by: INTERNAL MEDICINE

## 2025-07-24 PROCEDURE — 3017F COLORECTAL CA SCREEN DOC REV: CPT | Performed by: INTERNAL MEDICINE

## 2025-07-24 NOTE — PROGRESS NOTES
CC:  LUC, Obesity    BACKGROUND -   Last visit: 06/12/25  First visit: 11/18/21    Obesity   Began in childhood  Initial BMI 61.6, Wt 347.6 lbs, Ht 5' 3\"  HS Grad wt 190 lbs   Lowest   wt  165 lbs age 23  Highest  wt  416 lbs  Pattern of wt gain: grad overall, but with sig losses followed by rapid regain  Wt change past yr: gained 40 lbs, then down 35 lbs  Most wt lost: 94 lbs (food logging + exercise)  Other diets attempted: WW, SF, Meridia, Fad    Desire to lose wt: 10/10  Prob posed by appetite: 7/10    Initial Diet:   Number of meals per day - 4   Number of snacks per day - 6   Meal volume - 12\" plate, sometimes seconds   Fast food/convenience store - 6-8x/week   Restaurants (not fast food) - 1-2x/week   Sweets - 7d/week (8-10 oreos/day or other chocolate snacks/desserts)   Chips - 5-6d/week (8 oz bag)   Crackers/pretzels - 2d/week (2 cups Cheezits)   Nuts - 0d/week   Peanut Butter - 3d/week (4 Tbsp on bread as a meal)   Popcorn - 2d/week (reg sized MW bag)   Dried fruit - 0d/week   Whole fruit - 5-6d/week (1 serving)   Breakfast cereal - 0-1d/week   Granola/Protein/Energy bar - 0d/week   Sugar sweetened beverages - 40 oz reg soda/d, SB Trenta Refresher Tea 4x/wk (190 each)   Protein - No supplements   Fiber - No supplements     Exercise:    Gym membership - none    Walking - none    Running - none    Resistance - none    Aerobic class - none    ______________________    PFSH -  Past Medical History:   Diagnosis Date    Anxiety     Coronary arteriosclerosis     Degenerative joint disease     involving multiple joints    Depression     Glucose intolerance     Hyperlipidemia     Hypothyroidism     Insulin resistance     MI, old 1998    last saw Dr Nazario 1/2022 ECHO 2021 EF 55-60%    Obesity     Palpitations     Peptic reflux disease     Pre-diabetes     Sleep apnea     compliant with CPAP     Current Outpatient Medications   Medication Sig Dispense Refill    polyethylene glycol (GLYCOLAX) 17 GM/SCOOP powder Take

## 2025-07-24 NOTE — PATIENT INSTRUCTIONS
Count every calorie every day    Limit calories to 1400 rachel/day (this is a target)    Limit sweets to one day per month    Limit salty snacks to 100 rachel/day    Do not drink any sugar sweetened beverages    Limit restaurant food to once every week    Protein intake should be 75 grams/day    Fiber intake should be 22 grams/day     Cont venlafaxine ER to one 75 mg tablet + one 37.5 mg tablet every day     Make sure that water intake is >64 oz/day    Cont Mounjaro 5 mg once each week.    Take Colace 100 mg, one capsule twice daily    Take 1 heaping Tbsp of Myralax daily as needed    ______________________      Follow up  One month as scheduled

## 2025-07-25 DIAGNOSIS — I10 ESSENTIAL HYPERTENSION: ICD-10-CM

## 2025-07-25 RX ORDER — ATENOLOL 25 MG/1
25 TABLET ORAL 3 TIMES DAILY
Qty: 270 TABLET | Refills: 5 | Status: SHIPPED | OUTPATIENT
Start: 2025-07-25

## 2025-08-22 ENCOUNTER — OFFICE VISIT (OUTPATIENT)
Dept: SURGERY | Age: 60
End: 2025-08-22
Payer: COMMERCIAL

## 2025-08-22 VITALS
HEART RATE: 71 BPM | HEIGHT: 63 IN | WEIGHT: 287 LBS | BODY MASS INDEX: 50.85 KG/M2 | DIASTOLIC BLOOD PRESSURE: 86 MMHG | SYSTOLIC BLOOD PRESSURE: 135 MMHG

## 2025-08-22 DIAGNOSIS — K43.2 INCISIONAL HERNIA, WITHOUT OBSTRUCTION OR GANGRENE: Primary | ICD-10-CM

## 2025-08-22 DIAGNOSIS — E66.01 MORBID OBESITY WITH BMI OF 50.0-59.9, ADULT (HCC): ICD-10-CM

## 2025-08-22 PROCEDURE — G8427 DOCREV CUR MEDS BY ELIG CLIN: HCPCS | Performed by: SURGERY

## 2025-08-22 PROCEDURE — G8417 CALC BMI ABV UP PARAM F/U: HCPCS | Performed by: SURGERY

## 2025-08-22 PROCEDURE — 1036F TOBACCO NON-USER: CPT | Performed by: SURGERY

## 2025-08-22 PROCEDURE — 3017F COLORECTAL CA SCREEN DOC REV: CPT | Performed by: SURGERY

## 2025-08-22 PROCEDURE — 3075F SYST BP GE 130 - 139MM HG: CPT | Performed by: SURGERY

## 2025-08-22 PROCEDURE — 99215 OFFICE O/P EST HI 40 MIN: CPT | Performed by: SURGERY

## 2025-08-22 PROCEDURE — 3079F DIAST BP 80-89 MM HG: CPT | Performed by: SURGERY

## 2025-08-22 RX ORDER — ACETAMINOPHEN 325 MG/1
1000 TABLET ORAL ONCE
OUTPATIENT
Start: 2025-08-22 | End: 2025-08-22

## 2025-08-22 RX ORDER — SODIUM CHLORIDE 0.9 % (FLUSH) 0.9 %
5-40 SYRINGE (ML) INJECTION EVERY 12 HOURS SCHEDULED
OUTPATIENT
Start: 2025-08-22

## 2025-08-22 RX ORDER — CELECOXIB 200 MG/1
200 CAPSULE ORAL ONCE
OUTPATIENT
Start: 2025-08-22 | End: 2025-08-22

## 2025-08-22 RX ORDER — SODIUM CHLORIDE 0.9 % (FLUSH) 0.9 %
5-40 SYRINGE (ML) INJECTION PRN
OUTPATIENT
Start: 2025-08-22

## 2025-08-22 RX ORDER — SODIUM CHLORIDE 9 MG/ML
INJECTION, SOLUTION INTRAVENOUS PRN
OUTPATIENT
Start: 2025-08-22

## 2025-08-25 ENCOUNTER — TELEPHONE (OUTPATIENT)
Dept: SURGERY | Age: 60
End: 2025-08-25

## 2025-08-25 PROBLEM — K43.2 INCISIONAL HERNIA: Status: ACTIVE | Noted: 2025-08-25

## 2025-08-28 ENCOUNTER — OFFICE VISIT (OUTPATIENT)
Age: 60
End: 2025-08-28
Payer: COMMERCIAL

## 2025-08-28 VITALS
HEIGHT: 63 IN | HEART RATE: 68 BPM | TEMPERATURE: 97.2 F | BODY MASS INDEX: 51.42 KG/M2 | WEIGHT: 290.2 LBS | SYSTOLIC BLOOD PRESSURE: 121 MMHG | DIASTOLIC BLOOD PRESSURE: 69 MMHG

## 2025-08-28 DIAGNOSIS — G47.33 SLEEP APNEA, OBSTRUCTIVE: Primary | ICD-10-CM

## 2025-08-28 DIAGNOSIS — E74.39 GLUCOSE INTOLERANCE: ICD-10-CM

## 2025-08-28 DIAGNOSIS — I25.10 CORONARY ARTERIOSCLEROSIS: ICD-10-CM

## 2025-08-28 DIAGNOSIS — E88.819 INSULIN RESISTANCE: ICD-10-CM

## 2025-08-28 DIAGNOSIS — E66.813 CLASS 3 SEVERE OBESITY DUE TO EXCESS CALORIES WITHOUT SERIOUS COMORBIDITY WITH BODY MASS INDEX (BMI) OF 60.0 TO 69.9 IN ADULT (HCC): ICD-10-CM

## 2025-08-28 DIAGNOSIS — R73.03 PRE-DIABETES: ICD-10-CM

## 2025-08-28 PROCEDURE — 3074F SYST BP LT 130 MM HG: CPT | Performed by: INTERNAL MEDICINE

## 2025-08-28 PROCEDURE — 1036F TOBACCO NON-USER: CPT | Performed by: INTERNAL MEDICINE

## 2025-08-28 PROCEDURE — G8428 CUR MEDS NOT DOCUMENT: HCPCS | Performed by: INTERNAL MEDICINE

## 2025-08-28 PROCEDURE — G8417 CALC BMI ABV UP PARAM F/U: HCPCS | Performed by: INTERNAL MEDICINE

## 2025-08-28 PROCEDURE — 99214 OFFICE O/P EST MOD 30 MIN: CPT | Performed by: INTERNAL MEDICINE

## 2025-08-28 PROCEDURE — 3017F COLORECTAL CA SCREEN DOC REV: CPT | Performed by: INTERNAL MEDICINE

## 2025-08-28 PROCEDURE — 3078F DIAST BP <80 MM HG: CPT | Performed by: INTERNAL MEDICINE

## 2025-09-03 ENCOUNTER — PATIENT MESSAGE (OUTPATIENT)
Dept: PRIMARY CARE CLINIC | Age: 60
End: 2025-09-03

## (undated) DEVICE — BLOCK BITE 60FR RUBBER ADLT DENTAL

## (undated) DEVICE — RETRIEVER ENDOSCP L230CM SHTH DIA2.5MM NET 3X6CM STD FOR

## (undated) DEVICE — SNARE ENDOSCP L240CM LOOP W13MM SHTH DIA2.4MM SM OVL FLX

## (undated) DEVICE — TRAP POLYP ETRAP

## (undated) DEVICE — ELECTRODE PT RET AD L9FT HI MOIST COND ADH HYDRGEL CORDED

## (undated) DEVICE — FORCEPS BX OVL CUP FEN DISPOSABLE CAP L 160CM RAD JAW 4

## (undated) DEVICE — CATHETER SCLERO L240CM NDL 25GA L4MM SHTH DIA2.3MM CNTRST

## (undated) DEVICE — GRADUATE TRIANG MEASURE 1000ML BLK PRNT

## (undated) DEVICE — SPONGE GZ W4XL4IN RAYON POLY CVR W/NONWOVEN FAB STRL 2/PK